# Patient Record
Sex: FEMALE | Race: WHITE | NOT HISPANIC OR LATINO | Employment: OTHER | URBAN - NONMETROPOLITAN AREA
[De-identification: names, ages, dates, MRNs, and addresses within clinical notes are randomized per-mention and may not be internally consistent; named-entity substitution may affect disease eponyms.]

---

## 2017-05-08 ENCOUNTER — OFFICE VISIT - GICH (OUTPATIENT)
Dept: FAMILY MEDICINE | Facility: OTHER | Age: 27
End: 2017-05-08

## 2017-05-08 ENCOUNTER — HISTORY (OUTPATIENT)
Dept: FAMILY MEDICINE | Facility: OTHER | Age: 27
End: 2017-05-08

## 2017-05-08 DIAGNOSIS — N39.0 URINARY TRACT INFECTION: ICD-10-CM

## 2017-05-08 DIAGNOSIS — R35.0 FREQUENCY OF MICTURITION: ICD-10-CM

## 2017-05-08 LAB
BACTERIA URINE: ABNORMAL BACTERIA/HPF
BILIRUB UR QL: NEGATIVE
CLARITY, URINE: ABNORMAL CLARITY
COLOR UR: YELLOW COLOR
EPITHELIAL CELLS: ABNORMAL EPI/HPF
GLUCOSE URINE: NEGATIVE MG/DL
KETONES UR QL: NEGATIVE MG/DL
LEUKOCYTE ESTERASE URINE: ABNORMAL
NITRITE UR QL STRIP: NEGATIVE
OCCULT BLOOD,URINE - HISTORICAL: ABNORMAL
PH UR: 6.5 [PH]
PROTEIN QUALITATIVE,URINE - HISTORICAL: 30 MG/DL
RBC - HISTORICAL: ABNORMAL /HPF
SP GR UR STRIP: 1.01
UROBILINOGEN,QUALITATIVE - HISTORICAL: NORMAL EU/DL
WBC - HISTORICAL: ABNORMAL /HPF

## 2017-05-10 ENCOUNTER — HISTORY (OUTPATIENT)
Dept: FAMILY MEDICINE | Facility: OTHER | Age: 27
End: 2017-05-10

## 2017-05-10 ENCOUNTER — OFFICE VISIT - GICH (OUTPATIENT)
Dept: FAMILY MEDICINE | Facility: OTHER | Age: 27
End: 2017-05-10

## 2017-05-10 DIAGNOSIS — N30.00 ACUTE CYSTITIS WITHOUT HEMATURIA: ICD-10-CM

## 2017-05-10 LAB
CULTURE - HISTORICAL: ABNORMAL
CULTURE - HISTORICAL: ABNORMAL
SUSCEPTIBILITY RESULT - HISTORICAL: ABNORMAL

## 2017-07-21 ENCOUNTER — PRENATAL OFFICE VISIT - GICH (OUTPATIENT)
Dept: FAMILY MEDICINE | Facility: OTHER | Age: 27
End: 2017-07-21

## 2017-07-21 ENCOUNTER — HISTORY (OUTPATIENT)
Dept: FAMILY MEDICINE | Facility: OTHER | Age: 27
End: 2017-07-21

## 2017-07-21 DIAGNOSIS — Z33.1 PREGNANT STATE, INCIDENTAL: ICD-10-CM

## 2017-07-21 DIAGNOSIS — Z12.4 ENCOUNTER FOR SCREENING FOR MALIGNANT NEOPLASM OF CERVIX: ICD-10-CM

## 2017-07-21 DIAGNOSIS — N92.6 IRREGULAR MENSTRUATION: ICD-10-CM

## 2017-07-21 LAB
ABORH - HISTORICAL: NORMAL
ANTIBODY SCREEN - HISTORICAL: NEGATIVE
HBSAG CATEGORY - HISTORICAL: NONREACTIVE
HCG UR QL: POSITIVE
HEMOGLOBIN: 12.8 G/DL (ref 12–16)
MCV RBC AUTO: 83 FL (ref 80–100)
RUBELLA COMMENT - HISTORICAL: NORMAL
SPECIMEN EXPIRATION DATE/TIME - HISTORICAL: NORMAL

## 2017-07-22 LAB — TREPONEMA PALLIDUM - HISTORICAL: NEGATIVE

## 2017-07-26 ENCOUNTER — HOSPITAL ENCOUNTER (OUTPATIENT)
Dept: RADIOLOGY | Facility: OTHER | Age: 27
End: 2017-07-26
Attending: FAMILY MEDICINE

## 2017-07-26 DIAGNOSIS — Z33.1 PREGNANT STATE, INCIDENTAL: ICD-10-CM

## 2017-07-28 ENCOUNTER — AMBULATORY - GICH (OUTPATIENT)
Dept: FAMILY MEDICINE | Facility: OTHER | Age: 27
End: 2017-07-28

## 2017-07-28 DIAGNOSIS — R87.612 LOW GRADE SQUAMOUS INTRAEPITHELIAL LESION ON CYTOLOGIC SMEAR OF CERVIX (LGSIL): ICD-10-CM

## 2017-08-25 ENCOUNTER — HISTORY (OUTPATIENT)
Dept: EMERGENCY MEDICINE | Facility: OTHER | Age: 27
End: 2017-08-25

## 2017-08-28 ENCOUNTER — HISTORY (OUTPATIENT)
Dept: FAMILY MEDICINE | Facility: OTHER | Age: 27
End: 2017-08-28

## 2017-08-28 ENCOUNTER — PRENATAL OFFICE VISIT - GICH (OUTPATIENT)
Dept: FAMILY MEDICINE | Facility: OTHER | Age: 27
End: 2017-08-28

## 2017-08-28 DIAGNOSIS — Z34.02 ENCOUNTER FOR SUPERVISION OF NORMAL FIRST PREGNANCY IN SECOND TRIMESTER: ICD-10-CM

## 2017-08-28 DIAGNOSIS — O21.9 VOMITING OF PREGNANCY: ICD-10-CM

## 2017-08-28 ASSESSMENT — PATIENT HEALTH QUESTIONNAIRE - PHQ9: SUM OF ALL RESPONSES TO PHQ QUESTIONS 1-9: 0

## 2017-09-11 ENCOUNTER — HISTORY (OUTPATIENT)
Dept: EMERGENCY MEDICINE | Facility: OTHER | Age: 27
End: 2017-09-11

## 2017-10-06 ENCOUNTER — PRENATAL OFFICE VISIT - GICH (OUTPATIENT)
Dept: OBGYN | Facility: OTHER | Age: 27
End: 2017-10-06

## 2017-10-06 ENCOUNTER — HISTORY (OUTPATIENT)
Dept: OBGYN | Facility: OTHER | Age: 27
End: 2017-10-06

## 2017-10-06 DIAGNOSIS — O21.9 VOMITING OF PREGNANCY: ICD-10-CM

## 2017-10-06 DIAGNOSIS — Z34.93 ENCOUNTER FOR SUPERVISION OF NORMAL PREGNANCY IN THIRD TRIMESTER: ICD-10-CM

## 2017-10-09 ENCOUNTER — HOSPITAL ENCOUNTER (OUTPATIENT)
Dept: RADIOLOGY | Facility: OTHER | Age: 27
End: 2017-10-09
Attending: OBSTETRICS & GYNECOLOGY

## 2017-10-09 DIAGNOSIS — O21.9 VOMITING OF PREGNANCY: ICD-10-CM

## 2017-11-07 ENCOUNTER — PRENATAL OFFICE VISIT - GICH (OUTPATIENT)
Dept: OBGYN | Facility: OTHER | Age: 27
End: 2017-11-07

## 2017-11-07 DIAGNOSIS — Z34.02 ENCOUNTER FOR SUPERVISION OF NORMAL FIRST PREGNANCY IN SECOND TRIMESTER: ICD-10-CM

## 2017-11-15 ENCOUNTER — COMMUNICATION - GICH (OUTPATIENT)
Dept: FAMILY MEDICINE | Facility: OTHER | Age: 27
End: 2017-11-15

## 2017-11-15 DIAGNOSIS — Z34.02 ENCOUNTER FOR SUPERVISION OF NORMAL FIRST PREGNANCY IN SECOND TRIMESTER: ICD-10-CM

## 2017-11-17 ENCOUNTER — AMBULATORY - GICH (OUTPATIENT)
Dept: FAMILY MEDICINE | Facility: OTHER | Age: 27
End: 2017-11-17

## 2017-12-05 ENCOUNTER — PRENATAL OFFICE VISIT - GICH (OUTPATIENT)
Dept: OBGYN | Facility: OTHER | Age: 27
End: 2017-12-05

## 2017-12-05 ENCOUNTER — HISTORY (OUTPATIENT)
Dept: OBGYN | Facility: OTHER | Age: 27
End: 2017-12-05

## 2017-12-05 DIAGNOSIS — Z34.02 ENCOUNTER FOR SUPERVISION OF NORMAL FIRST PREGNANCY IN SECOND TRIMESTER: ICD-10-CM

## 2017-12-05 LAB
ABSOLUTE BASOPHILS - HISTORICAL: 0 THOU/CU MM
ABSOLUTE EOSINOPHILS - HISTORICAL: 0.1 THOU/CU MM
ABSOLUTE IMMATURE GRANULOCYTES(METAS,MYELOS,PROS) - HISTORICAL: 0.1 THOU/CU MM
ABSOLUTE LYMPHOCYTES - HISTORICAL: 1.5 THOU/CU MM (ref 0.9–2.9)
ABSOLUTE MONOCYTES - HISTORICAL: 0.7 THOU/CU MM
ABSOLUTE NEUTROPHILS - HISTORICAL: 7.4 THOU/CU MM (ref 1.7–7)
BASOPHILS # BLD AUTO: 0.2 %
EOSINOPHIL NFR BLD AUTO: 0.8 %
ERYTHROCYTE [DISTWIDTH] IN BLOOD BY AUTOMATED COUNT: 13.1 % (ref 11.5–15.5)
GLU GEST SCREEN 1HR 50G: 87 MG/DL (ref 65–139)
HCT VFR BLD AUTO: 33.6 % (ref 33–51)
HEMOGLOBIN: 10.9 G/DL (ref 12–16)
IMMATURE GRANULOCYTES(METAS,MYELOS,PROS) - HISTORICAL: 0.5 %
LYMPHOCYTES NFR BLD AUTO: 15.4 % (ref 20–44)
MCH RBC QN AUTO: 26.3 PG (ref 26–34)
MCHC RBC AUTO-ENTMCNC: 32.4 G/DL (ref 32–36)
MCV RBC AUTO: 81 FL (ref 80–100)
MONOCYTES NFR BLD AUTO: 6.9 %
NEUTROPHILS NFR BLD AUTO: 76.2 % (ref 42–72)
PLATELET # BLD AUTO: 280 THOU/CU MM (ref 140–440)
PMV BLD: 9.9 FL (ref 6.5–11)
RED BLOOD COUNT - HISTORICAL: 4.15 MIL/CU MM (ref 4–5.2)
WHITE BLOOD COUNT - HISTORICAL: 9.7 THOU/CU MM (ref 4.5–11)

## 2017-12-07 LAB — TREPONEMA PALLIDUM - HISTORICAL: NEGATIVE

## 2017-12-27 NOTE — PROGRESS NOTES
Patient Information     Patient Name MRN Sex Tasia Garay [id number] Female 1990      Progress Notes by Phani Good MD at 10/6/2017  3:00 PM     Author:  Phani Godo MD Service:  (none) Author Type:  Physician     Filed:  10/6/2017  4:18 PM Encounter Date:  10/6/2017 Status:  Signed     :  Phani Good MD (Physician)            [facility]  Return OB Visit    S: Patient reports she has been feeling naueated. She denies cramping, contractions, vaginal bleeding, leaking fluid. She reports no normal fetal movement yet. She has no other complaints.    O: /60  Pulse 68  Wt 82.3 kg (181 lb 6.4 oz)  LMP 2017 (Exact Date)  Breastfeeding? No  BMI 29.28 kg/m2  See flowsheet    Gen: Well-appearing, NAD  Fundal Height:  19  FHR: 145      A/P:  Tasia Duarte is a 26 y.o.  at 19w1d, here for return OB visit.    PNC:   - Prenatal labs:   AB Rh Positive  Rubella immune  tdap tbd  GCT tbd  Syphilis neg  Flu shot declines  GBS:tbd    Estimated Date of Delivery: 3/1/18  - Genetics: declined  - Imaging: tbd  - Immunizations: tdap at 28 weeks.  RTC one month.    Phani Good MD FACOG  3:06 PM 10/6/2017

## 2017-12-27 NOTE — PROGRESS NOTES
Patient Information     Patient Name MRN Sex Tasia Garay [id number] Female 1990      Progress Notes by Arelis Inman R.T. ([facility]) at 10/9/2017 12:43 PM     Author:  Arelis In[facility] R.T. ([facility]) Service:  (none) Author Type:  [employer] - Registered Radiologic Technologist     Filed:  10/9/2017 12:43 PM Date of Service:  10/9/2017 12:43 PM Status:  Signed     :  Arelis Inman R.T. ([facility]) (Replaced by [facility] - Registered Radiologic Technologist)            Falls Risk Criteria:    Age 65 and older or under age 4        Sensory deficits    Poor vision    Use of ambulatory aides    Impaired judgment    Unable to walk independently    Meets High Risk criteria for falls:  no

## 2017-12-27 NOTE — PROGRESS NOTES
Patient Information     Patient Name MRN Sex Tasia Garay [id number] Female 1990      Progress Notes by Ana Sanches MD at 2017  5:32 PM     Author:  Ana Sanches MD Service:  (none) Author Type:  Physician     Filed:  2017  5:32 PM Encounter Date:  2017 Status:  Signed     :  Ana Sanches MD (Physician)            Notified of results via [employer].

## 2017-12-27 NOTE — PROGRESS NOTES
Patient Information     Patient Name MRN Sex Tasia Garay [id number] Female 1990      Progress Notes by Jing Camacho at 2017  8:52 AM     Author:  Jing Camacho Service:  (none) Author Type:  Other Clinical Staff     Filed:  2017  9:16 AM Date of Service:  2017  8:52 AM Status:  Signed     :  Jing Camacho (Other Clinical Staff)            Falls Risk Criteria:    Age 65 and older or under age 4        Sensory deficits    Poor vision    Use of ambulatory aides    Impaired judgment    Unable to walk independently    Meets High Risk criteria for falls:  No

## 2017-12-28 NOTE — PROGRESS NOTES
Patient Information     Patient Name MRTasia Knox [id number] Female 1990      Progress Notes by Swati Randle MD at 2017  9:00 AM     Author:  Swati Randle MD Service:  (none) Author Type:  Physician     Filed:  2017  9:46 AM Encounter Date:  2017 Status:  Signed     :  Swati Randle MD (Physician)            [facility]  Return OB Visit    S: Patient reports she has been feeling well. She denies cramping, contractions, vaginal bleeding, leaking fluid. She reports normal fetal movement. She has no other complaints.    O: /70  Pulse 84  Wt 82.3 kg (181 lb 6.4 oz)  LMP 2017 (Exact Date)  BMI 29.28 kg/m2  Gen: Well-appearing, NAD    Fundal Height:  24  FHR: 150    A/P:  Tasia Duarte is a 26 y.o.  at 23w5d by 8w6d US, here for return OB visit.    PNC:   - Prenatal labs reviewed, Rh positive, Rubella immune  - Genetics: declined  - Imaging: dating US at 8w6d, normal anatomy at 19w4d  - Immunizations: declines flu  RTC 4 wks- will need Tdap, GCT, syphilis, CBC    Swati Randle MD  OB/GYN  2017 9:25 AM

## 2017-12-28 NOTE — PROGRESS NOTES
Patient Information     Patient Name MRN Sex     Tasia Duarte [id number] Female 1990      Progress Notes by Ana Sanches MD at 2017  9:49 AM     Author:  Ana Sanches MD Service:  (none) Author Type:  Physician     Filed:  2017 11:11 AM Encounter Date:  2017 Status:  Signed     :  Ana Sanches MD (Physician)              PRENATAL EXAM - OB    Tasia Duarte is a 26 y.o. female, G 1, P 0, here today for a prenatal exam.    :  1990  Race/Ethnicity:  /White        Marital Status: single  Jewish:  None  Occupation:  outside work - full time  ( at [employer])  [facility]   's Provider:  provider not selected at this time     Education (last grade completed):  12th    /Father of baby:    Partner's Occupation:    Contact Phone #:      MENSTRUAL HISTORY  LMP:  Patient's last menstrual period was 2017 (exact date).  Cycle Regularity:  regular, every 28-30 days  Flow:  normal    GIDEON by LMP Calculator:  18  Date Reliability:  definite  Menarche (age onset):  15 years of age  On BCP's at conception: no     MEDICAL HISTORY  OB History                    Para  Term     AB  Living     1                 SAB   TAB  Ectopic  Multiple   Live Births                                         # Outcome Date  GA  Lbr Paul/2nd  Weight Sex  Delivery Anes PTL Lv   1 Current                                     No past medical history on file.    Past Surgical History:      Procedure  Laterality Date     NO PAST SURGERIES         Family History:  No family history on file.    Social History:  Social History     Substance Use Topics       Smoking status: Never Smoker     Smokeless tobacco: Never Used     Alcohol use No       RISK FACTORS  Exercise Times/wk:  0  Seat Belt Use: 100%  Alcohol/day: 0  Meds/Drugs/ETOH Since LMP:  No  Birth Control Method: none  High Risk Behavior: none    INFECTION HISTORY  Current Drug Use:   none  HIV Risk Evaluation:  low risk  Hepatitis B Risk Evaluation: low risk  Hepatitis B Immunized: yes  TB Exposure: no    Personal History of Genital Herpes: no  Partner History of Genital Herpes: no  Rash/Viral Illness Since LMP: No  History of STD: chlamydia  Other:      REVIEW OF SYSTEMS   Review of Systems Negative.    PHYSICAL EXAM  /62  Wt 79.4 kg (175 lb)  LMP 05/20/2017 (Exact Date)  Breastfeeding? No  BMI 27.82 kg/m2  General Appearance:  Alert, appropriate appearance for age. No acute distress  HEENT Exam:  Grossly normal.  Neck / Thyroid Exam:  Supple, no masses, nodes or enlargement.  Chest/Respiratory Exam: Normal chest wall and respirations. Clear to auscultation.  Cardiovascular Exam: Regular rate and rhythm. S1, S2, no murmur, click, gallop, or rubs.  Gastrointestinal Exam: Soft, non-tender, no masses or organomegaly.  Pelvic Exam Female: Vulva and vagina appear normal. Bimanual exam reveals normal uterus and adnexa.   Musculoskeletal Exam: Back is straight and non-tender, full ROM of upper and lower extremities.  Skin: no rash or abnormalities  Neurologic Exam: Normal gait and speech, no tremor.  Psychiatric Exam: Alert and oriented, appropriate affect.    ASSESSMENT    ICD-10-CM    1. Missed periods N92.6 Urine pregnancy test (HCG), qualitative      Urine pregnancy test (HCG), qualitative   2. Early stage of pregnancy Z33.1 TYPE & SCREEN      HEMOGLOBIN      HBSAG (HBS)      RUBELLA IMMUNE STATUS      TREPONEMA PALLIDUM      GYN PROBE - GC CHLAMYDIA DNA PCR [[id number]]      URINE CULTURE [83681.2]      TYPE & SCREEN      HEMOGLOBIN      HBSAG (HBS)      RUBELLA IMMUNE STATUS      TREPONEMA PALLIDUM      URINE CULTURE [25082.2]      GYN PROBE - GC CHLAMYDIA DNA PCR [[id number]]      US OB ANY TRI TV   3. Screening for malignant neoplasm of cervix Z12.4 GYN THIN PREP PAP SCREEN IMAGED [[id number]]      GYN THIN PREP PAP SCREEN IMAGED [[id number]]       Satisfactory prenatal exam.  Demonstrates  appropriate and health seeking behaviors toward her pregnancy.  Verbalizes good understanding of prenatal care schedule and the importance of coming to each visit as scheduled.  Has supportive family relationship.     PLAN  Discussed routine recommendations and anticipatory guidance. Recommended PNVs. Discussed screening tests available. OB packet provided. Will order routine OB labs. Discussed timing of follow-up and ultrasound. Would recommend dating ultrasound.    Ana Sanches MD

## 2017-12-28 NOTE — PROGRESS NOTES
Patient Information     Patient Name MRN Sex Tasia Garay [id number] Female 1990      Progress Notes by Ana Sanches MD at 2017  2:30 PM     Author:  Ana Sanches MD Service:  (none) Author Type:  Physician     Filed:  2017  2:30 PM Date of Service:  2017  2:30 PM Status:  Signed     :  Ana Sanches MD (Physician)            Notified of results via [employer].

## 2017-12-28 NOTE — PROGRESS NOTES
Patient Information     Patient Name MRN Sex Tasia Garay [id number] Female 1990      Progress Notes by Ana Sanches MD at 2017  8:45 AM     Author:  Ana Sanches MD Service:  (none) Author Type:  Physician     Filed:  2017  9:25 AM Encounter Date:  2017 Status:  Signed     :  Ana Sanches MD (Physician)            Reviewed previous labs, colp scheduled for LSIL pap.   Reviewed ultrasound and date discrepancy, adjusted EDC to correspond with 8w ultrasound.   Patient declines quad screen.   Recent ED visit for nausea and emesis. Feels much better after hydration.  Needs prescription for occasional use of zofran. Ana Sanches MD

## 2017-12-28 NOTE — TELEPHONE ENCOUNTER
Patient Information     Patient Name MRN Sex Tasia Garay [id number] Female 1990      Telephone Encounter by Ana Sanches MD at 11/15/2017  2:51 PM     Author:  Ana Sanches MD Service:  (none) Author Type:  Physician     Filed:  11/15/2017  2:55 PM Encounter Date:  11/15/2017 Status:  Signed     :  Ana Sanches MD (Physician)            Patient can have labs done at the time of her next OB appointment. So does not need to come on a separate day, and labs can be ordered at the time of her appointment.     Also, it looks like she missed her appointment for the colposcopy. She should discuss this with Dr. Randle at her appointment.    Ana Sanches MD

## 2017-12-30 NOTE — NURSING NOTE
Patient Information     Patient Name MRN Tasia Caba [id number] Female 1990      Nursing Note by Brinda De Santiago at 10/6/2017  3:00 PM     Author:  Brinda De Santiago Service:  (none) Author Type:  NURS- Student Nurse     Filed:  10/6/2017  3:03 PM Encounter Date:  10/6/2017 Status:  Signed     :  Brinda De Santiago (NURS- Student Nurse)            Patient is here for an OB check. 2 baby step coupons given. Brinda De Santiago RN ....................  10/6/2017   2:52 PM

## 2017-12-30 NOTE — NURSING NOTE
Patient Information     Patient Name MRN Tasia Caba [id number] Female 1990      Nursing Note by Yuliana Mascorro at 2017  9:30 AM     Author:  Yuliana Mascorro Service:  (none) Author Type:  (none)     Filed:  2017  9:59 AM Encounter Date:  2017 Status:  Signed     :  Yuliana Mascorro            Patient here for OB physical.   Yuliana Mascorro LPN ..........2017 9:35 AM

## 2018-01-02 ENCOUNTER — HISTORY (OUTPATIENT)
Dept: OBGYN | Facility: OTHER | Age: 28
End: 2018-01-02

## 2018-01-02 ENCOUNTER — PRENATAL OFFICE VISIT - GICH (OUTPATIENT)
Dept: OBGYN | Facility: OTHER | Age: 28
End: 2018-01-02

## 2018-01-02 DIAGNOSIS — Z34.03 ENCOUNTER FOR SUPERVISION OF NORMAL FIRST PREGNANCY IN THIRD TRIMESTER: ICD-10-CM

## 2018-01-04 NOTE — NURSING NOTE
Patient Information     Patient Name MRN Tasia Caba [id number] Female 1990      Nursing Note by Kelsie Ruff at 2017  7:00 PM     Author:  Kelsie Ruff Service:  (none) Author Type:  (none)     Filed:  2017  7:07 PM Encounter Date:  2017 Status:  Signed     :  Kelsie Ruff            Patient presents to clinic with hurting during urination, lower back pain.  Kelsie Pearson ....................  2017   7:07 PM

## 2018-01-04 NOTE — PROGRESS NOTES
"Patient Information     Patient Name MRN Sex Tasia Garay [id number] Female 1990      Progress Notes by Candida Zhu NP at 2017  7:00 PM     Author:  Candida Zhu NP Service:  (none) Author Type:  PHYS- Nurse Practitioner     Filed:  2017  8:13 PM Encounter Date:  2017 Status:  Signed     :  Candida Zhu NP (PHYS- Nurse Practitioner)            HPI:    Tasia Duarte is a 26 y.o. female who presents to clinic today for urinary tract infection. Has had them in the past. Started a few weeks ago thought was getting one so took OTC stuff. Then yesterday got a fever. Has frequency, pressure, pain with voiding, and right back pain. No blood in urine.  Nursing Notes:   Kelsie Ruff  2017  7:07 PM  Signed  Patient presents to clinic with hurting during urination, lower back pain.  Kelsie Pearson ....................  2017   7:07 PM          No past medical history on file.  Past Surgical History:      Procedure  Laterality Date     NO PAST SURGERIES       Social History     Substance Use Topics       Smoking status: Never Smoker     Smokeless tobacco: Never Used     Alcohol use No     No current outpatient prescriptions on file.     No current facility-administered medications for this visit.      Medications have been reviewed by me and are current to the best of my knowledge and ability.    No Known Allergies    ROS:  Refer to HPI    /68  Pulse 100  Temp 100.1  F (37.8  C) (Tympanic)   Ht 1.689 m (5' 6.5\")  Wt 76.4 kg (168 lb 6.4 oz)  LMP 2017  Breastfeeding? No  BMI 26.77 kg/m2    EXAM:  General Appearance: Well appearing female, appropriate appearance for age. No acute distress  Head: normocephalic, atraumatic  Eyes: conjunctivae normal, no drainage  Neck: supple without adenopathy  Respiratory: normal chest wall and respirations.  Normal effort.  Clear to auscultation bilaterally, no wheezes or rhonchi or congestion, no cough " appreciated,  Cardiac: RRR with no murmurs  Abdomen: soft, nontender, normal bowel sounds present  : mild suprapubic tenderness. Right CVA tenderness  Musculoskeletal:full ROM  Dermatological: no rashes or lesions  Psychological: normal affect, alert and pleasant  Results for orders placed or performed in visit on 05/08/17      URINALYSIS W REFLEX MICROSCOPIC IF POSITIVE      Result  Value Ref Range    COLOR                     Yellow Yellow Color    CLARITY                   Cloudy (A) Clear Clarity    SPECIFIC GRAVITY,URINE    1.015 1.010, 1.015, 1.020, 1.025                    PH,URINE                  6.5 6.0, 7.0, 8.0, 5.5, 6.5, 7.5, 8.5                    UROBILINOGEN,QUALITATIVE  Normal Normal EU/dl    PROTEIN, URINE 30 (A) Negative mg/dL    GLUCOSE, URINE Negative Negative mg/dL    KETONES,URINE             Negative Negative mg/dL    BILIRUBIN,URINE           Negative Negative                    OCCULT BLOOD,URINE        Trace (A) Negative                    NITRITE                   Negative Negative                    LEUKOCYTE ESTERASE        Trace (A) Negative                   URINALYSIS MICROSCOPIC      Result  Value Ref Range    RBC 0-2 0-2, None Seen /HPF    WBC 6-10 (A) 0-2, 3-5, None Seen /HPF    BACTERIA                  Moderate (A) None Seen, Rare, Occasional, Few Bacteria/HPF    EPITHELIAL CELLS          Many (A) None Seen, Few Epi/HPF       ASSESSMENT/PLAN:    ICD-10-CM    1. Urinary tract infection without hematuria, site unspecified N39.0 trimethoprim-sulfamethoxazole, 160-800 mg, (BACTRIM DS, SEPTRA DS) tablet      URINE CULTURE   2. Frequency of urination R35.0 URINALYSIS W REFLEX MICROSCOPIC IF POSITIVE      URINALYSIS W REFLEX MICROSCOPIC IF POSITIVE      URINALYSIS MICROSCOPIC      URINALYSIS MICROSCOPIC       UTI: Antibiotic has been sent to pharmacy. Please take full course of antibiotic even if symptoms have completely resolved. This helps prevent against antibiotic resistance.      We will culture the urine to see what bacteria grows out of the urine.  We will call the patient if a change of antibiotic is necessary per the culture.  Patient was instructed in increase fluids including water and cranberry juice and or may take cranberry tablets.  Monitor for fevers, chills, back pain.  Return to clinic after antibiotic completion if symptoms are not resolved.  Call clinic or Primary Care Provider if symptoms change/worsen.    Handouts reviewed and given. States understanding of plan.    Encouraged fluids  Symptomatic treatments  Tylenol or ibuprofen PRN  Follow up if symptoms persist or worsen    Patient Instructions      Index Related topics   Bladder Infection   ________________________________________________________________________  KEY POINTS    A bladder infection, also called cystitis, is a type of urinary tract infection. Your urinary tract includes your kidneys, ureters, bladder, and urethra.    Your healthcare provider will likely prescribe an antibiotic medicine and medicine to help relieve burning and discomfort.    Follow the full course of treatment prescribed by your healthcare provider. If you are taking an antibiotic medicine, take all of it as prescribed, even if your symptoms are gone.  ________________________________________________________________________  What is a bladder infection?  A bladder infection, also called cystitis, is a type of urinary tract infection. The urinary tract includes your:    Kidneys, which make urine    Ureters, which are the tubes that carry urine from the kidneys to the bladder    Bladder, which stores urine    Urethra, which is the tube that drains urine from the bladder and out of the body  What is the cause?  Bladder infections are usually caused by bacteria. Normally there should be no bacteria in your urinary tract. Bacteria that cause infections in the urinary tract often spread from the rectum or vagina to the urethra and up into the  bladder.  Bladder infections are more common in women because the urethra is short. The short urethra makes it easier for bacteria from the rectum or the genital area to reach the bladder. This can happen during sex. Young women often have bladder infections when they have just started having sex. In older women, irritation and dryness of the vagina after menopause may increase the risk for bladder infections.  Bacteria may grow in the bladder if the flow of urine is blocked. For example, when a woman is pregnant, pressure from the baby can cause this problem. In men, an enlarged prostate may cause a blockage. Stones in the kidney or bladder can also cause blockage and infections.  If you have recently had a urinary catheter (for example, during surgery) or if you need to use a catheter every day, you are more likely to get bladder infections.  What are the symptoms?  The symptoms range from mild to severe. They may include:    Urinating more often    Feeling an urgent need to urinate or feeling that your bladder is always full    Pain or burning when you urinate    Pain in your lower belly, low back, or your side    Urine that smells bad    Urine that looks cloudy, reddish, or bloody    Leaking of urine  Fever and chills are more common with kidney infections, but can also happen with bladder infections.  How is it diagnosed?  Your healthcare provider will ask about your symptoms and medical history and examine you. You may have urine and blood tests.    If you have many bladder infections, you may need to have additional tests to see if there is a problem in the kidneys or urinary tract:    An intravenous pyelogram (IVP), which is a series of X-rays taken after your healthcare provider injects dye into your blood vessels to look for blockages in your kidneys and urinary tract    An ultrasound, which uses sound waves to show pictures of the kidneys and urinary tract  Men may have tests after just one infection  because bladder infections are less common in men and may mean there are other problems.  How is it treated?  Your healthcare provider will likely prescribe an antibiotic and medicine to help relieve burning and discomfort. Prompt treatment of a bladder infection with antibiotics usually relieves the symptoms in 1 to 2 days. If your infection has been causing symptoms for several days before treatment or if you have a fever, it may take longer to feel better.  It s important to get prompt treatment for bladder infections. If the infection is not treated, it can damage your kidneys and make you very sick. If the infection spreads to your blood, it can be life-threatening. If you are very sick, you may need to be in the hospital and get antibiotic medicine IV.  How can I take care of myself?    Drink plenty of water each day to flush your bladder and urinary tract unless your healthcare provider has told you to limit how much liquid you drink.    If you have a fever, ask your healthcare provider if you should take an NSAID or acetaminophen. Read the label and take as directed. Unless recommended by your healthcare provider, you should not take these medicines for more than 10 days.    Nonsteroidal anti-inflammatory medicines (NSAIDs), such as ibuprofen, naproxen, and aspirin, may cause stomach bleeding and other problems. These risks increase with age.    Acetaminophen may cause liver damage or other problems. Unless recommended by your provider, don't take more than 3000 milligrams (mg) in 24 hours. To make sure you don t take too much, check other medicines you take to see if they also contain acetaminophen. Ask your provider if you need to avoid drinking alcohol while taking this medicine.    Follow the full course of treatment prescribed by your healthcare provider. If you were prescribed an antibiotic medicine, take the antibiotics for as long as your healthcare provider prescribes, even if you feel better. If  you stop taking the medicine too soon, you may not kill all of the bacteria and you may get sick again. If you have side effects from your medicine, talk to your healthcare provider.    Ask your provider:    How and when you will get your test results    How long it will take to recover    If there are activities you should avoid and when you can return to your normal activities    How to take care of yourself at home    What symptoms or problems you should watch for and what to do if you have them    Make sure you know when you should come back for a checkup.  How can I help prevent bladder infection?  You may help prevent bladder infection if you:    Drink enough liquids to keep urine light yellow in color.    Drink a glass of cranberry juice each day. The juice should be real cranberry juice, not a cranberry-flavored drink.    Don t wait to go to the bathroom if you feel the need to urinate.    Practice safe sex:    Ask your healthcare provider which type of condom, diaphragm, or other birth control is right for you.    Urinate soon after sex.    Keep your genital area clean. If you want to have vaginal sex after anal sex, both partners should wash their genitals first.    Empty your bladder completely when you urinate.    Don t wear a wet bathing suit for long periods of time.  Also, if you are a woman:    If you often have bladder infections, keep a journal to see if they are related to sex. If they tend to happen after sex, your provider may prescribe medicine for you to take to help prevent infection.    Don t use irritating cosmetics or chemicals in your genital area. This includes, for example, strong soaps, feminine hygiene sprays, douches, scented tampons, sanitary napkins, or panty liners.    Keep your vaginal area clean. Wiping from front to back after using the toilet may help prevent infections. Use mild, unscented soap to wash your genital area gently each time you bathe or shower.    Wear underwear  that is all cotton or has a cotton crotch. Pantyhose should also have a cotton crotch. Cotton absorbs moisture better than nylon. Change underwear and pantyhose every day.    During pregnancy, tell your healthcare provider if you have had urinary tract problems in the past and let your provider know if you are having symptoms.    If you have reached menopause and are not taking estrogen, prescription estrogen vaginal cream may help prevent bladder infections.  Men should always wash their penis during baths or showers. Men who are not circumcised should gently pull back the foreskin and wash the tip of the penis when they take a bath or shower.  Developed by [employer].  Adult Advisor 2016.3 published by [employer].  Last modified: 2016-05-10  Last reviewed: 2016-04-26  This content is reviewed periodically and is subject to change as new health information becomes available. The information is intended to inform and educate and is not a replacement for medical evaluation, advice, diagnosis or treatment by a healthcare professional.  References   Adult Advisor 2016.3 Index    Copyright   2016 [employer], a division of [employer] All rights reserved.           Antibiotic has been sent to pharmacy. Please take full course of antibiotic even if symptoms have completely resolved. This helps prevent against antibiotic resistance.     We will culture the urine to see what bacteria grows out of the urine.  We will call the patient if a change of antibiotic is necessary per the culture.  Patient was instructed in increase fluids including water and cranberry juice and or may take cranberry tablets.  Monitor for fevers, chills, back pain.  Return to clinic after antibiotic completion if symptoms are not resolved.  Call clinic or Primary Care Provider if symptoms change/worsen.    Handouts reviewed and given. States understanding of plan.          FARHAN PULLIAM NP ....................  5/8/2017   7:15  PM

## 2018-01-04 NOTE — PATIENT INSTRUCTIONS
Patient Information     Patient Name MRN Tasia Caba [id number] Female 1990      Patient Instructions by Candida Zhu NP at 2017  7:00 PM     Author:  Candida Zhu NP Service:  (none) Author Type:  PHYS- Nurse Practitioner     Filed:  2017  7:35 PM Encounter Date:  2017 Status:  Signed     :  Candida Zhu NP (PHYS- Nurse Practitioner)               Index Related topics   Bladder Infection   ________________________________________________________________________  KEY POINTS    A bladder infection, also called cystitis, is a type of urinary tract infection. Your urinary tract includes your kidneys, ureters, bladder, and urethra.    Your healthcare provider will likely prescribe an antibiotic medicine and medicine to help relieve burning and discomfort.    Follow the full course of treatment prescribed by your healthcare provider. If you are taking an antibiotic medicine, take all of it as prescribed, even if your symptoms are gone.  ________________________________________________________________________  What is a bladder infection?  A bladder infection, also called cystitis, is a type of urinary tract infection. The urinary tract includes your:    Kidneys, which make urine    Ureters, which are the tubes that carry urine from the kidneys to the bladder    Bladder, which stores urine    Urethra, which is the tube that drains urine from the bladder and out of the body  What is the cause?  Bladder infections are usually caused by bacteria. Normally there should be no bacteria in your urinary tract. Bacteria that cause infections in the urinary tract often spread from the rectum or vagina to the urethra and up into the bladder.  Bladder infections are more common in women because the urethra is short. The short urethra makes it easier for bacteria from the rectum or the genital area to reach the bladder. This can happen during sex. Young women often have  bladder infections when they have just started having sex. In older women, irritation and dryness of the vagina after menopause may increase the risk for bladder infections.  Bacteria may grow in the bladder if the flow of urine is blocked. For example, when a woman is pregnant, pressure from the baby can cause this problem. In men, an enlarged prostate may cause a blockage. Stones in the kidney or bladder can also cause blockage and infections.  If you have recently had a urinary catheter (for example, during surgery) or if you need to use a catheter every day, you are more likely to get bladder infections.  What are the symptoms?  The symptoms range from mild to severe. They may include:    Urinating more often    Feeling an urgent need to urinate or feeling that your bladder is always full    Pain or burning when you urinate    Pain in your lower belly, low back, or your side    Urine that smells bad    Urine that looks cloudy, reddish, or bloody    Leaking of urine  Fever and chills are more common with kidney infections, but can also happen with bladder infections.  How is it diagnosed?  Your healthcare provider will ask about your symptoms and medical history and examine you. You may have urine and blood tests.    If you have many bladder infections, you may need to have additional tests to see if there is a problem in the kidneys or urinary tract:    An intravenous pyelogram (IVP), which is a series of X-rays taken after your healthcare provider injects dye into your blood vessels to look for blockages in your kidneys and urinary tract    An ultrasound, which uses sound waves to show pictures of the kidneys and urinary tract  Men may have tests after just one infection because bladder infections are less common in men and may mean there are other problems.  How is it treated?  Your healthcare provider will likely prescribe an antibiotic and medicine to help relieve burning and discomfort. Prompt treatment of  a bladder infection with antibiotics usually relieves the symptoms in 1 to 2 days. If your infection has been causing symptoms for several days before treatment or if you have a fever, it may take longer to feel better.  It s important to get prompt treatment for bladder infections. If the infection is not treated, it can damage your kidneys and make you very sick. If the infection spreads to your blood, it can be life-threatening. If you are very sick, you may need to be in the hospital and get antibiotic medicine IV.  How can I take care of myself?    Drink plenty of water each day to flush your bladder and urinary tract unless your healthcare provider has told you to limit how much liquid you drink.    If you have a fever, ask your healthcare provider if you should take an NSAID or acetaminophen. Read the label and take as directed. Unless recommended by your healthcare provider, you should not take these medicines for more than 10 days.    Nonsteroidal anti-inflammatory medicines (NSAIDs), such as ibuprofen, naproxen, and aspirin, may cause stomach bleeding and other problems. These risks increase with age.    Acetaminophen may cause liver damage or other problems. Unless recommended by your provider, don't take more than 3000 milligrams (mg) in 24 hours. To make sure you don t take too much, check other medicines you take to see if they also contain acetaminophen. Ask your provider if you need to avoid drinking alcohol while taking this medicine.    Follow the full course of treatment prescribed by your healthcare provider. If you were prescribed an antibiotic medicine, take the antibiotics for as long as your healthcare provider prescribes, even if you feel better. If you stop taking the medicine too soon, you may not kill all of the bacteria and you may get sick again. If you have side effects from your medicine, talk to your healthcare provider.    Ask your provider:    How and when you will get your test  results    How long it will take to recover    If there are activities you should avoid and when you can return to your normal activities    How to take care of yourself at home    What symptoms or problems you should watch for and what to do if you have them    Make sure you know when you should come back for a checkup.  How can I help prevent bladder infection?  You may help prevent bladder infection if you:    Drink enough liquids to keep urine light yellow in color.    Drink a glass of cranberry juice each day. The juice should be real cranberry juice, not a cranberry-flavored drink.    Don t wait to go to the bathroom if you feel the need to urinate.    Practice safe sex:    Ask your healthcare provider which type of condom, diaphragm, or other birth control is right for you.    Urinate soon after sex.    Keep your genital area clean. If you want to have vaginal sex after anal sex, both partners should wash their genitals first.    Empty your bladder completely when you urinate.    Don t wear a wet bathing suit for long periods of time.  Also, if you are a woman:    If you often have bladder infections, keep a journal to see if they are related to sex. If they tend to happen after sex, your provider may prescribe medicine for you to take to help prevent infection.    Don t use irritating cosmetics or chemicals in your genital area. This includes, for example, strong soaps, feminine hygiene sprays, douches, scented tampons, sanitary napkins, or panty liners.    Keep your vaginal area clean. Wiping from front to back after using the toilet may help prevent infections. Use mild, unscented soap to wash your genital area gently each time you bathe or shower.    Wear underwear that is all cotton or has a cotton crotch. Pantyhose should also have a cotton crotch. Cotton absorbs moisture better than nylon. Change underwear and pantyhose every day.    During pregnancy, tell your healthcare provider if you have had  urinary tract problems in the past and let your provider know if you are having symptoms.    If you have reached menopause and are not taking estrogen, prescription estrogen vaginal cream may help prevent bladder infections.  Men should always wash their penis during baths or showers. Men who are not circumcised should gently pull back the foreskin and wash the tip of the penis when they take a bath or shower.  Developed by [employer].  Adult Advisor 2016.3 published by [employer].  Last modified: 2016-05-10  Last reviewed: 2016-04-26  This content is reviewed periodically and is subject to change as new health information becomes available. The information is intended to inform and educate and is not a replacement for medical evaluation, advice, diagnosis or treatment by a healthcare professional.  References   Adult Advisor 2016.3 Index    Copyright   2016 [employer], a division of [employer] All rights reserved.           Antibiotic has been sent to pharmacy. Please take full course of antibiotic even if symptoms have completely resolved. This helps prevent against antibiotic resistance.     We will culture the urine to see what bacteria grows out of the urine.  We will call the patient if a change of antibiotic is necessary per the culture.  Patient was instructed in increase fluids including water and cranberry juice and or may take cranberry tablets.  Monitor for fevers, chills, back pain.  Return to clinic after antibiotic completion if symptoms are not resolved.  Call clinic or Primary Care Provider if symptoms change/worsen.    Handouts reviewed and given. States understanding of plan.

## 2018-01-05 NOTE — NURSING NOTE
Patient Information     Patient Name MRN Tasia Caba [id number] Female 1990      Nursing Note by Kelsie Ruff at 5/10/2017 10:30 AM     Author:  Kelsie Ruff Service:  (none) Author Type:  (none)     Filed:  5/10/2017 10:47 AM Encounter Date:  5/10/2017 Status:  Signed     :  Kelsie Ruff            Patient presents to clinic with concerns that she is not getting any better since starting the antibiotic for UTI. She was seen in the [facility] Monday, got 1 dose of antibiotics in on Monday.   Kelsie RuffLPN ....................  5/10/2017   10:39 AM

## 2018-01-05 NOTE — PROGRESS NOTES
"Patient Information     Patient Name MRN Sex Tasia Garay [id number] Female 1990      Progress Notes by Joyce Bob NP at 5/10/2017 10:30 AM     Author:  Joyce Bob NP Service:  (none) Author Type:  PHYS- Nurse Practitioner     Filed:  2017  3:48 PM Encounter Date:  5/10/2017 Status:  Signed     :  Joyce Bob NP (PHYS- Nurse Practitioner)            Nursing Notes:   Kelsie Ruff  5/10/2017 10:47 AM  Signed  Patient presents to clinic with concerns that she is not getting any better since starting the antibiotic for UTI. She was seen in the [facility] Monday, got 1 dose of antibiotics in on Monday.   Kelsie Pearson ....................  5/10/2017   10:39 AM    SUBJECTIVE:    Tasia Duarte is a 26 y.o. female who presents for uti not better    HPI  Tasia Duarte here with c/o UTI not getting better. Still feels hot and cold. Mild LT flank pain, however had these when seen in the R/C. Started on bactrim. Urine CTX back now, shows resistant to many abx. Including Bactrim.     Current Outpatient Prescriptions on File Prior to Visit       Medication  Sig Dispense Refill     trimethoprim-sulfamethoxazole, 160-800 mg, (BACTRIM DS, SEPTRA DS) tablet Take 1 tablet by mouth 2 times daily for 10 days. 20 tablet 0     No current facility-administered medications on file prior to visit.        REVIEW OF SYSTEMS:  ROS    OBJECTIVE:  /54  Pulse 84  Temp 96.2  F (35.7  C) (Tympanic)  Ht 1.689 m (5' 6.5\")  Wt 75.8 kg (167 lb 3.2 oz)  LMP 2017  BMI 26.58 kg/m2    EXAM:   Physical Exam   Constitutional: She is well-developed, well-nourished, and in no distress.   HENT:   Head: Normocephalic and atraumatic.   Cardiovascular: Normal rate.    Pulmonary/Chest: Effort normal. No respiratory distress.   Abdominal: Soft. Bowel sounds are normal. There is tenderness in the suprapubic area. There is no rebound and no guarding.   Skin: Skin is warm and " dry.   Nursing note and vitals reviewed.      ASSESSMENT/PLAN:    ICD-10-CM    1. Acute cystitis without hematuria N30.00 ciprofloxacin HCl (CIPRO) 250 mg tablet      cefTRIAXone injection (ROCEPHIN) 1 g        Plan:  Discussed the ABX currently on is not going to work. Opted to change abx, and give a shot of Rocephin. If not improving over the next few days she needs to come to the ER or RTC.     I explained my diagnostic considerations and recommendations to the patient, who voiced understanding and agreement with the treatment plan. All questions were answered. We discussed potential side effects of any prescribed or recommended therapies, as well as expectations for response to treatments. She was advised to contact our office if there is no improvement or worsening of conditions or symptoms.  If s/s worsen or persist, patient will either come back or follow up with PCP.       AMANDA DENG NP ....................  5/11/2017   3:48 PM

## 2018-01-05 NOTE — PATIENT INSTRUCTIONS
Patient Information     Patient Name MRN Tasia Caba [id number] Female 1990      Patient Instructions by Joyce Bob NP at 5/10/2017 10:30 AM     Author:  Joyce Bob NP  Service:  (none) Author Type:  PHYS- Nurse Practitioner     Filed:  5/10/2017 11:00 AM  Encounter Date:  5/10/2017 Status:  Addendum     :  Joyce Bob NP (PHYS- Nurse Practitioner)        Related Notes: Original Note by Joyce Bob NP (PHYS- Nurse Practitioner) filed at 5/10/2017 10:58 AM            Stop the Bactrim and throw them away.     If by Friday you are not feeling better please call or come back in.

## 2018-01-16 ENCOUNTER — HISTORY (OUTPATIENT)
Dept: OBGYN | Facility: OTHER | Age: 28
End: 2018-01-16

## 2018-01-16 ENCOUNTER — PRENATAL OFFICE VISIT - GICH (OUTPATIENT)
Dept: OBGYN | Facility: OTHER | Age: 28
End: 2018-01-16

## 2018-01-16 DIAGNOSIS — Z34.03 ENCOUNTER FOR SUPERVISION OF NORMAL FIRST PREGNANCY IN THIRD TRIMESTER: ICD-10-CM

## 2018-01-27 VITALS
DIASTOLIC BLOOD PRESSURE: 60 MMHG | DIASTOLIC BLOOD PRESSURE: 60 MMHG | HEART RATE: 72 BPM | HEIGHT: 67 IN | WEIGHT: 178.5 LBS | WEIGHT: 181.4 LBS | BODY MASS INDEX: 28.02 KG/M2 | BODY MASS INDEX: 28.84 KG/M2 | SYSTOLIC BLOOD PRESSURE: 100 MMHG | RESPIRATION RATE: 16 BRPM | HEART RATE: 68 BPM | SYSTOLIC BLOOD PRESSURE: 116 MMHG | TEMPERATURE: 97.9 F

## 2018-01-27 VITALS
WEIGHT: 175 LBS | HEART RATE: 84 BPM | BODY MASS INDEX: 27.82 KG/M2 | DIASTOLIC BLOOD PRESSURE: 70 MMHG | WEIGHT: 181.4 LBS | SYSTOLIC BLOOD PRESSURE: 110 MMHG | BODY MASS INDEX: 28.84 KG/M2 | SYSTOLIC BLOOD PRESSURE: 118 MMHG | DIASTOLIC BLOOD PRESSURE: 62 MMHG

## 2018-01-27 VITALS
BODY MASS INDEX: 26.24 KG/M2 | DIASTOLIC BLOOD PRESSURE: 54 MMHG | HEART RATE: 84 BPM | WEIGHT: 167.2 LBS | HEIGHT: 67 IN | SYSTOLIC BLOOD PRESSURE: 108 MMHG | TEMPERATURE: 96.2 F

## 2018-01-27 VITALS
HEIGHT: 67 IN | HEART RATE: 100 BPM | DIASTOLIC BLOOD PRESSURE: 68 MMHG | WEIGHT: 168.4 LBS | BODY MASS INDEX: 26.43 KG/M2 | TEMPERATURE: 100.1 F | SYSTOLIC BLOOD PRESSURE: 110 MMHG

## 2018-01-28 ENCOUNTER — HEALTH MAINTENANCE LETTER (OUTPATIENT)
Age: 28
End: 2018-01-28

## 2018-02-01 ENCOUNTER — HISTORY (OUTPATIENT)
Dept: OBGYN | Facility: OTHER | Age: 28
End: 2018-02-01

## 2018-02-01 ENCOUNTER — PRENATAL OFFICE VISIT - GICH (OUTPATIENT)
Dept: OBGYN | Facility: OTHER | Age: 28
End: 2018-02-01

## 2018-02-01 DIAGNOSIS — O26.843 UTERINE SIZE-DATE DISCREPANCY IN THIRD TRIMESTER: ICD-10-CM

## 2018-02-01 DIAGNOSIS — Z34.03 ENCOUNTER FOR SUPERVISION OF NORMAL FIRST PREGNANCY IN THIRD TRIMESTER: ICD-10-CM

## 2018-02-03 LAB — CULTURE - HISTORICAL: NORMAL

## 2018-02-04 ASSESSMENT — PATIENT HEALTH QUESTIONNAIRE - PHQ9: SUM OF ALL RESPONSES TO PHQ QUESTIONS 1-9: 0

## 2018-02-05 ENCOUNTER — HOSPITAL ENCOUNTER (OUTPATIENT)
Dept: RADIOLOGY | Facility: OTHER | Age: 28
End: 2018-02-05

## 2018-02-05 DIAGNOSIS — O26.843 UTERINE SIZE-DATE DISCREPANCY IN THIRD TRIMESTER: ICD-10-CM

## 2018-02-06 ENCOUNTER — HISTORY (OUTPATIENT)
Dept: OBGYN | Facility: OTHER | Age: 28
End: 2018-02-06

## 2018-02-06 ENCOUNTER — PRENATAL OFFICE VISIT - GICH (OUTPATIENT)
Dept: OBGYN | Facility: OTHER | Age: 28
End: 2018-02-06

## 2018-02-06 DIAGNOSIS — Z34.03 ENCOUNTER FOR SUPERVISION OF NORMAL FIRST PREGNANCY IN THIRD TRIMESTER: ICD-10-CM

## 2018-02-08 ENCOUNTER — DOCUMENTATION ONLY (OUTPATIENT)
Dept: FAMILY MEDICINE | Facility: OTHER | Age: 28
End: 2018-02-08

## 2018-02-08 PROBLEM — Z34.02 ENCOUNTER FOR SUPERVISION OF NORMAL FIRST PREGNANCY IN SECOND TRIMESTER: Status: ACTIVE | Noted: 2017-08-28

## 2018-02-08 PROBLEM — M41.20 SCOLIOSIS (AND KYPHOSCOLIOSIS), IDIOPATHIC: Status: ACTIVE | Noted: 2018-02-08

## 2018-02-08 PROBLEM — R87.619 ABNORMAL PAP SMEAR OF CERVIX: Status: ACTIVE | Noted: 2017-11-17

## 2018-02-08 PROBLEM — O21.9 NAUSEA/VOMITING IN PREGNANCY: Status: ACTIVE | Noted: 2017-08-28

## 2018-02-08 RX ORDER — BREAST PUMP
EACH MISCELLANEOUS
COMMUNITY
Start: 2018-01-02 | End: 2020-05-12

## 2018-02-08 RX ORDER — ONDANSETRON 4 MG/1
4 TABLET, ORALLY DISINTEGRATING ORAL EVERY 8 HOURS PRN
COMMUNITY
Start: 2017-10-06 | End: 2019-09-09

## 2018-02-08 RX ORDER — ACETAMINOPHEN 325 MG/1
TABLET ORAL EVERY 4 HOURS PRN
COMMUNITY
End: 2019-09-09

## 2018-02-09 VITALS
WEIGHT: 202.5 LBS | HEART RATE: 108 BPM | SYSTOLIC BLOOD PRESSURE: 112 MMHG | BODY MASS INDEX: 32.19 KG/M2 | DIASTOLIC BLOOD PRESSURE: 74 MMHG

## 2018-02-09 VITALS
BODY MASS INDEX: 31.72 KG/M2 | DIASTOLIC BLOOD PRESSURE: 60 MMHG | HEART RATE: 108 BPM | SYSTOLIC BLOOD PRESSURE: 110 MMHG | WEIGHT: 199.5 LBS

## 2018-02-09 VITALS
WEIGHT: 196.13 LBS | HEART RATE: 100 BPM | SYSTOLIC BLOOD PRESSURE: 114 MMHG | DIASTOLIC BLOOD PRESSURE: 64 MMHG | BODY MASS INDEX: 31.18 KG/M2

## 2018-02-09 VITALS
BODY MASS INDEX: 33.07 KG/M2 | SYSTOLIC BLOOD PRESSURE: 112 MMHG | WEIGHT: 208 LBS | HEART RATE: 104 BPM | DIASTOLIC BLOOD PRESSURE: 68 MMHG

## 2018-02-09 VITALS
HEART RATE: 72 BPM | SYSTOLIC BLOOD PRESSURE: 108 MMHG | DIASTOLIC BLOOD PRESSURE: 54 MMHG | WEIGHT: 190.4 LBS | BODY MASS INDEX: 30.27 KG/M2

## 2018-02-12 NOTE — PROGRESS NOTES
Patient Information     Patient Name MRNOAH Sex Tasia Rangel [id number] Female 1990      Progress Notes by Swati Randle MD at 2017  3:30 PM     Author:  Swati Randle MD Service:  (none) Author Type:  Physician     Filed:  2017  4:17 PM Encounter Date:  2017 Status:  Signed     :  Swati Randle MD (Physician)            [facility]  Return OB Visit    S: Patient reports she has been feeling well. She denies cramping, contractions, vaginal bleeding, leaking fluid. She reports normal fetal movement. She has no other complaints.    O: /54  Pulse 72  Wt 86.4 kg (190 lb 6.4 oz)  LMP 2017 (Exact Date)  BMI 30.73 kg/m2  Gen: Well-appearing, NAD    Fundal Height:  29  FHR: 160    A/P:  Tasia Duarte is a 26 y.o.  at 27w5d by 8w6d US, here for return OB visit.  LSIL pap at new OB: needs repeat at one year    PNC:   - Prenatal labs reviewed, Rh positive, Rubella immune, GCT today  - Genetics: declined  - Imaging: dating US at 8w6d, normal anatomy at 19w4d  - Immunizations: declined flu. Tdap 2017   RTC 4 weeks    Swati Randle MD  OB/GYN  2017 3:59 PM

## 2018-02-12 NOTE — NURSING NOTE
Patient Information     Patient Name MRN Tasia Lobato [id number] Female 1990      Nursing Note by Viviana Victoria at 2018  2:30 PM     Author:  Viviana Victoria Service:  (none) Author Type:  (none)     Filed:  2018  2:49 PM Encounter Date:  2018 Status:  Signed     :  Viviana Victoria            2 Babystep coupons given.  Viviana Victoria LPN  2018  2:36 PM

## 2018-02-12 NOTE — PROGRESS NOTES
Patient Information     Patient Name MRN Sex Tasia Rangel [id number] Female 1990      Progress Notes by Ana Sanches MD at 2017 12:11 PM     Author:  Ana Sanches MD Service:  (none) Author Type:  Physician     Filed:  2017 12:11 PM Encounter Date:  2017 Status:  Signed     :  Ana Sanches MD (Physician)            Notified of results via [employer].

## 2018-02-13 NOTE — PATIENT INSTRUCTIONS
Patient Information     Patient Name MRN Tasia Lobato [id number] Female 1990      Patient Instructions by Swati Randle MD at 2018  2:30 PM     Author:  Swati Randle MD Service:  (none) Author Type:  Physician     Filed:  2018  2:59 PM Encounter Date:  2018 Status:  Signed     :  Swati Randle MD (Physician)            Labor and Delivery [phone]

## 2018-02-13 NOTE — NURSING NOTE
Patient Information     Patient Name MRN Tasia Lobato [id number] Female 1990      Nursing Note by Viviana Victoria at 2018  2:30 PM     Author:  Viviana Victoria Service:  (none) Author Type:  (none)     Filed:  2018  2:59 PM Encounter Date:  2018 Status:  Signed     :  Viviana Victoria            1 Babystep coupon given.  Viviana Victoria LPN  2018  2:50 PM

## 2018-02-13 NOTE — PROGRESS NOTES
Patient Information     Patient Name MRN Sex Tasia Rangel [id number] Female 1990      Progress Notes by Arelis Inman R.T. ([facility]) at 2018  4:02 PM     Author:  [facility] In[facility] R.T. [facility] Service:  (none) Author Type:  [employer] - Registered Radiologic Technologist     Filed:  2018  4:02 PM Date of Service:  2018  4:02 PM Status:  Signed     :  Arelis Inman R.T. ([facility]) ([facility] - Registered Radiologic Technologist)            Falls Risk Criteria:    Age 65 and older or under age 4        Sensory deficits    Poor vision    Use of ambulatory aides    Impaired judgment    Unable to walk independently    Meets High Risk criteria for falls:  no

## 2018-02-13 NOTE — PROGRESS NOTES
Patient Information     Patient Name MRN Sex Tasia Rangel [id number] Female 1990      Progress Notes by Swati Randle MD at 2018  2:30 PM     Author:  Swati Randle MD Service:  (none) Author Type:  Physician     Filed:  2018  3:02 PM Encounter Date:  2018 Status:  Signed     :  Swati Randle MD (Physician)            [facility]  Return OB Visit    S: Patient reports she has been feeling well. Occasional cramping. She denies vaginal bleeding, leaking fluid. She reports normal fetal movement. She has no other complaints.    O: /64 (Cuff Site: Right Arm, Position: Sitting, Cuff Size: Adult Large)  Pulse 100  Wt 89 kg (196 lb 2 oz)  LMP 2017 (Exact Date)  BMI 31.66 kg/m2  Gen: Well-appearing, NAD    Fundal Height:  34  FHR: 160    A/P:  Tasia Duarte is a 27 y.o.  at 33w5d by 8w6d US, here for return OB visit.  LSIL pap at new OB: needs repeat at one year  Dyspnea of pregnancy  Contraception: undecided, considering IUD vs vasectomy. Also has partner's two sons living at home with them. Written information provided  Labor pain management: does not want epidural. Encouraged patient to look into other options to help cope with pain of labor (ie medication, hypnobirthing, etc)  Plans breastfeeding: breast pump Rx 2018       PNC:   - Prenatal labs reviewed, Rh positive, Rubella immune, GCT 87  - Genetics: declined  - Imaging: dating US at 8w6d, normal anatomy at 19w4d  - Immunizations: declined flu. Tdap 2017   RTC 2 weeks- GBS next visit    Swati Randle MD  OB/GYN  2018 2:54 PM

## 2018-02-13 NOTE — PROGRESS NOTES
Patient Information     Patient Name MRN Tasia Lobato [id number] Female 1990      Progress Notes by Swati Randle MD at 2018  8:45 AM     Author:  Swati Randle MD Service:  (none) Author Type:  Physician     Filed:  2018  8:52 AM Encounter Date:  2018 Status:  Signed     :  Swati Randle MD (Physician)            [facility]  Return OB Visit    S: Patient reports she has been feeling well. Occasional contractions at work. She denies vaginal bleeding, leaking fluid. She reports normal fetal movement. She has no other complaints.    O: /74 (Cuff Site: Right Arm, Position: Sitting, Cuff Size: Adult Large)  Pulse (!) 108  Wt 91.9 kg (202 lb 8 oz)  LMP 2017 (Exact Date)  BMI 32.68 kg/m2  Gen: Well-appearing, NAD  Cervix: 0/long/high. Cephalic on cervical exam    Fundal Height:  34  FHR: 150    A/P:  Tasia Duarte is a 27 y.o.  at 36w0d by 8w6d US, here for return OB visit.  LSIL pap at new OB: needs repeat at one year  Contraception: undecided, considering IUD vs vasectomy. Also has partner's two sons living at home with them. Written information provided  Labor pain management: does not want epidural. Encouraged patient to look into other options to help cope with pain of labor (ie medication, hypnobirthing, etc)  Plans breastfeeding: breast pump Rx 2018   S<D: no fundal height growth since last visit, will get growth US before next appt      PNC:   - Prenatal labs reviewed, Rh positive, Rubella immune, GCT 87. GBS 2018   - Genetics: declined  - Imaging: dating US at 8w6d, normal anatomy at 19w4d  - Immunizations: declined flu. Tdap 2017   RTC weekly until delivery    Swati Randle MD  OB/GYN  2018 8:48 AM

## 2018-02-13 NOTE — PROGRESS NOTES
Patient Information     Patient Name MRN Sex Tasia Rangel [id number] Female 1990      Progress Notes by Swati Randle MD at 2018  2:30 PM     Author:  Swati Randle MD Service:  (none) Author Type:  Physician     Filed:  2018  2:55 PM Encounter Date:  2018 Status:  Signed     :  Swati Randle MD (Physician)            [facility]  Return OB Visit    S: Patient reports she has been feeling well. She denies cramping, contractions, vaginal bleeding, leaking fluid. She reports normal fetal movement. She has no other complaints.    O: /68 (Cuff Site: Right Arm, Position: Sitting, Cuff Size: Adult Large)  Pulse (!) 104  Wt 94.3 kg (208 lb)  LMP 2017 (Exact Date)  BMI 33.57 kg/m2  Gen: Well-appearing, NAD    Fundal Height:  36  FHR: 155    A/P:  Tasia Melgar is a 27 y.o.  at 36w5d by 8w6d US, here for return OB visit.  LSIL pap at new OB: needs repeat at one year  Contraception: undecided, considering IUD vs vasectomy. Also has partner's two sons living at home with them. Written information provided  Labor pain management: does not want epidural.   Plans breastfeeding: breast pump Rx 2018   S<D: growth US 18 2922g (63%ile)      PNC:   - Prenatal labs reviewed, Rh positive, Rubella immune, GCT 87. GBS negative  - Genetics: declined  - Imaging: dating US at 8w6d, normal anatomy at 19w4d  - Immunizations: declined flu. Tdap 2017   RTC weekly until delivery    Swati Randle MD  OB/GYN  2018 2:45 PM

## 2018-02-15 ENCOUNTER — DOCUMENTATION ONLY (OUTPATIENT)
Dept: FAMILY MEDICINE | Facility: OTHER | Age: 28
End: 2018-02-15

## 2018-02-15 ENCOUNTER — PRENATAL OFFICE VISIT (OUTPATIENT)
Dept: OBGYN | Facility: OTHER | Age: 28
End: 2018-02-15
Attending: OBSTETRICS & GYNECOLOGY
Payer: COMMERCIAL

## 2018-02-15 VITALS
BODY MASS INDEX: 33.39 KG/M2 | HEART RATE: 108 BPM | DIASTOLIC BLOOD PRESSURE: 76 MMHG | SYSTOLIC BLOOD PRESSURE: 126 MMHG | WEIGHT: 210 LBS

## 2018-02-15 DIAGNOSIS — Z34.03 ENCOUNTER FOR SUPERVISION OF NORMAL FIRST PREGNANCY IN THIRD TRIMESTER: Primary | ICD-10-CM

## 2018-02-15 PROCEDURE — 99207 ZZC OB VISIT-NO CHARGE - GICH ONLY: CPT | Performed by: OBSTETRICS & GYNECOLOGY

## 2018-02-15 ASSESSMENT — PAIN SCALES - GENERAL: PAINLEVEL: NO PAIN (0)

## 2018-02-15 NOTE — MR AVS SNAPSHOT
"              After Visit Summary   2/15/2018    Tasia Melgar    MRN: [id number]           Patient Information     Date Of Birth          1990        Visit Information        Provider Department      2/15/2018 2:30 PM Swati Randle MD [facility]        Today's Diagnoses     Encounter for supervision of normal first pregnancy in third trimester    -  1       Follow-ups after your visit        Your next 10 appointments already scheduled     Feb 20, 2018  2:30 PM CST   ESTABLISHED PRENATAL with Swati Randle MD   [facility]    [address]  [place] [address]   [phone]            Feb 27, 2018  2:30 PM CST   ESTABLISHED PRENATAL with Swati Randle MD   [facility] ([facility])    [address]  [place] [address]   [phone]              Who to contact     If you have questions or need follow up information about today's clinic visit or your schedule please contact [facility] directly at [phone].  Normal or non-critical lab and imaging results will be communicated to you by [employer], letter or phone within 4 business days after the clinic has received the results. If you do not hear from us within 7 days, please contact the clinic through [email or web address] or phone. If you have a critical or abnormal lab result, we will notify you by phone as soon as possible.  Submit refill requests through [employer] or call your pharmacy and they will forward the refill request to us. Please allow 3 business days for your refill to be completed.          Additional Information About Your Visit        [employer] Information     [employer] lets you send messages to your doctor, view your test results, renew your prescriptions, schedule appointments and more. To sign up, go to [email or web address] . Click on \"Log in\" on the left side of the " "screen, which will take you to the Welcome page. Then click on \"Sign up Now\" on the right side of the page.     You will be asked to enter the access code listed below, as well as some personal information. Please follow the directions to create your username and password.     Your access code is: [email or web address]  Expires: 2018  3:05 PM     Your access code will  in 90 days. If you need help or a new code, please call your [place] clinic or [phone].        Care EveryWhere ID     This is your Care EveryWhere ID. This could be used by other organizations to access your [place] medical records  [id number]        Your Vitals Were     Pulse BMI (Body Mass Index)                108 33.39 kg/m2           Blood Pressure from Last 3 Encounters:   02/15/18 126/76   18 112/68   18 112/74    Weight from Last 3 Encounters:   02/15/18 95.3 kg (210 lb)   18 94.3 kg (208 lb)   18 91.9 kg (202 lb 8 oz)              Today, you had the following     No orders found for display       Primary Care Provider Office Phone # Fax #    Ana Sanches [phone]       [address] [facility] [address]        Equal Access to Services     [facility] : Hadii grecia kinsey hadasho Soomaali, waaxda luqadaha, qaybta kaalmada zoraida, jo javier. So [facility] [phone].    ATENCIÓN: Si habla español, tiene a caban disposición servicios gratuitos de asistencia lingüística. Llame al [phone].    We comply with applicable federal civil rights laws and [place] laws. We do not discriminate on the basis of race, color, national origin, age, disability, sex, sexual orientation, or gender identity.            Thank you!     Thank you for choosing [facility] AND [facility]  for your care. Our goal is always to provide you with excellent care. Hearing back from our patients is one way we can continue to improve our services. Please take a few minutes to " complete the written survey that you may receive in the mail after your visit with us. Thank you!             Your Updated Medication List - Protect others around you: Learn how to safely use, store and throw away your medicines at www.disposemymeds.org.          This list is accurate as of 2/15/18  3:05 PM.  Always use your most recent med list.                   Brand Name Dispense Instructions for use Diagnosis    acetaminophen 325 MG tablet    TYLENOL     Take by mouth every 4 hours as needed . Max acetaminophen dose: 4,000 mg per 24 hours.        breast pump Misc      For home use. Anticipated Gestation age at delivery: 40 weeks. Reason for need: breastfeeding. Length of need: 1 year        ondansetron 4 MG ODT tab    ZOFRAN-ODT     Place 4 mg under the tongue every 8 hours as needed for nausea or vomiting

## 2018-02-15 NOTE — NURSING NOTE
Patient presents today for her 38w0d prenatal check.  Viviana Victoria LPN  2/15/2018  2:45 PM

## 2018-02-15 NOTE — PROGRESS NOTES
Return OB Visit    S: Patient reports she has been overall feeling well. More cramping over the last few days. No VB or LOF. +FM.    O: /76 (BP Location: Right arm, Patient Position: Sitting, Cuff Size: Adult Regular)  Pulse 108  Wt 95.3 kg (210 lb)  BMI 33.39 kg/m2  Gen: Well-appearing, NAD  See OB Flowsheet    A/P:  Tasia Melgar is a 27 year old  at 38w0d by 8w6d US, here for return OB visit.  LSIL pap at new OB: needs repeat at one year  Contraception: undecided, considering IUD  Labor pain management: does not want epidural.   Plans breastfeeding: breast pump Rx 2018    on call  S<D: growth US 18 2922g (63%ile)      PNC:   - Prenatal labs reviewed, Rh positive, Rubella immune, GCT 87. GBS negative  - Genetics: declined  - Imaging: dating US at 8w6d, normal anatomy at 19w4d  - Immunizations: declined flu. Tdap 2017   RTC weekly until delivery    Swati Randle MD  OB/GYN  2/15/2018 3:03 PM

## 2018-02-19 ENCOUNTER — DOCUMENTATION ONLY (OUTPATIENT)
Dept: FAMILY MEDICINE | Facility: OTHER | Age: 28
End: 2018-02-19

## 2018-02-20 ENCOUNTER — PRENATAL OFFICE VISIT (OUTPATIENT)
Dept: OBGYN | Facility: OTHER | Age: 28
End: 2018-02-20
Attending: OBSTETRICS & GYNECOLOGY
Payer: COMMERCIAL

## 2018-02-20 VITALS
BODY MASS INDEX: 33.86 KG/M2 | WEIGHT: 213 LBS | SYSTOLIC BLOOD PRESSURE: 122 MMHG | DIASTOLIC BLOOD PRESSURE: 78 MMHG | HEART RATE: 100 BPM

## 2018-02-20 DIAGNOSIS — Z34.03 ENCOUNTER FOR SUPERVISION OF NORMAL FIRST PREGNANCY IN THIRD TRIMESTER: Primary | ICD-10-CM

## 2018-02-20 PROCEDURE — 99207 ZZC OB VISIT-NO CHARGE - GICH ONLY: CPT | Performed by: OBSTETRICS & GYNECOLOGY

## 2018-02-20 ASSESSMENT — PAIN SCALES - GENERAL: PAINLEVEL: NO PAIN (0)

## 2018-02-20 NOTE — MR AVS SNAPSHOT
"              After Visit Summary   2/20/2018    Tasia Melgar    MRN: [id number]           Patient Information     Date Of Birth          1990        Visit Information        Provider Department      2/20/2018 2:30 PM Swati Randle MD [facility]        Today's Diagnoses     Encounter for supervision of normal first pregnancy in third trimester    -  1       Follow-ups after your visit        Your next 10 appointments already scheduled     Feb 27, 2018  2:30 PM CST   ESTABLISHED PRENATAL with Swati Randle MD   [facility]    [address]  [place] [address] [phone]              Who to contact     If you have questions or need follow up information about today's clinic visit or your schedule please contact [facility] directly at [phone].  Normal or non-critical lab and imaging results will be communicated to you by [employer], letter or phone within 4 business days after the clinic has received the results. If you do not hear from us within 7 days, please contact the clinic through [employer] or phone. If you have a critical or abnormal lab result, we will notify you by phone as soon as possible.  Submit refill requests through [employer] or call your pharmacy and they will forward the refill request to us. Please allow 3 business days for your refill to be completed.          Additional Information About Your Visit        MyChart Information     [employer] lets you send messages to your doctor, view your test results, renew your prescriptions, schedule appointments and more. To sign up, go to [email or web address] . Click on \"Log in\" on the left side of the screen, which will take you to the Welcome page. Then click on \"Sign up Now\" on the right side of the page.     You will be asked to enter the access code listed below, as well as some personal information. Please follow the " directions to create your username and password.     Your access code is: [email or web address]  Expires: 2018  3:05 PM     Your access code will  in 90 days. If you need help or a new code, please call your [place] clinic or [phone].        Care EveryWhere ID     This is your Care EveryWhere ID. This could be used by other organizations to access your [place] medical records  [id number]        Your Vitals Were     Pulse BMI (Body Mass Index)                100 33.86 kg/m2           Blood Pressure from Last 3 Encounters:   18 122/78   02/15/18 126/76   18 112/68    Weight from Last 3 Encounters:   18 96.6 kg (213 lb)   02/15/18 95.3 kg (210 lb)   18 94.3 kg (208 lb)              Today, you had the following     No orders found for display       Primary Care Provider Office Phone # Fax #    Ana Sanches [phone] [address] [facility] [address]        Equal Access to Services     [facility]: Hadii aad ku hadsamio Sofaye, waaxda luron, qaybta kaalmatrinidad conway, jo elaine . So [facility] [phone].    ATENCIÓN: Si habla español, tiene a caban disposición servicios gratuitos de asistencia lingüística. Ll[facility] [phone].    We comply with applicable federal civil rights laws and [place] laws. We do not discriminate on the basis of race, color, national origin, age, disability, sex, sexual orientation, or gender identity.            Thank you!     Thank you for choosing [facility] AND [facility]  for your care. Our goal is always to provide you with excellent care. Hearing back from our patients is one way we can continue to improve our services. Please take a few minutes to complete the written survey that you may receive in the mail after your visit with us. Thank you!             Your Updated Medication List - Protect others around you: Learn how to safely use, store and throw away your medicines at  www.disposemymeds.org.          This list is accurate as of 2/20/18  2:50 PM.  Always use your most recent med list.                   Brand Name Dispense Instructions for use Diagnosis    acetaminophen 325 MG tablet    TYLENOL     Take by mouth every 4 hours as needed . Max acetaminophen dose: 4,000 mg per 24 hours.        breast pump Misc      For home use. Anticipated Gestation age at delivery: 40 weeks. Reason for need: breastfeeding. Length of need: 1 year        ondansetron 4 MG ODT tab    ZOFRAN-ODT     Place 4 mg under the tongue every 8 hours as needed for nausea or vomiting

## 2018-02-20 NOTE — NURSING NOTE
Patient presents today in the clinic for a prenatal visit. She is 38w5d.  Viviana Victoria LPN  2/20/2018  2:38 PM

## 2018-02-20 NOTE — PROGRESS NOTES
Return OB Visit    S: Patient has been feeling well. Noticing more cramping but only few contractions. No VB or LOF. +FM    O: /78 (BP Location: Right arm, Patient Position: Sitting, Cuff Size: Adult Regular)  Pulse 100  Wt 96.6 kg (213 lb)  BMI 33.86 kg/m2  Gen: Well-appearing, NAD  See OB Flowsheet    A/P:  Tasia Melgar is a 27 year old  at 38w5d by 8w6d US, here for return OB visit.  LSIL pap at new OB: needs repeat at one year  Contraception: undecided, considering IUD  Labor pain management: does not want epidural.   Plans breastfeeding: breast pump Rx 2018    on call  S<D: growth US 18 2922g (63%ile)      PNC:   - Prenatal labs reviewed, Rh positive, Rubella immune, GCT 87. GBS negative  - Genetics: declined  - Imaging: dating US at 8w6d, normal anatomy at 19w4d  - Immunizations: declined flu. Tdap 2017   RTC weekly until delivery    Swati Randle MD  OB/GYN  2018 2:49 PM

## 2018-02-27 ENCOUNTER — PRENATAL OFFICE VISIT (OUTPATIENT)
Dept: OBGYN | Facility: OTHER | Age: 28
End: 2018-02-27
Attending: OBSTETRICS & GYNECOLOGY
Payer: COMMERCIAL

## 2018-02-27 VITALS
HEART RATE: 100 BPM | DIASTOLIC BLOOD PRESSURE: 78 MMHG | WEIGHT: 215.19 LBS | BODY MASS INDEX: 34.21 KG/M2 | SYSTOLIC BLOOD PRESSURE: 128 MMHG

## 2018-02-27 DIAGNOSIS — Z34.03 ENCOUNTER FOR SUPERVISION OF NORMAL FIRST PREGNANCY IN THIRD TRIMESTER: Primary | ICD-10-CM

## 2018-02-27 PROCEDURE — 99207 ZZC OB VISIT-NO CHARGE - GICH ONLY: CPT | Performed by: OBSTETRICS & GYNECOLOGY

## 2018-02-27 ASSESSMENT — PAIN SCALES - GENERAL: PAINLEVEL: NO PAIN (0)

## 2018-02-27 NOTE — PROGRESS NOTES
Return OB Visit    S: Patient has been feeling okay. Had increased ctx 3 days ago, now only a few. Had some spotting after her ctx, none since. No LOF. +FM.    O: /78 (BP Location: Right arm, Patient Position: Sitting, Cuff Size: Adult Large)  Pulse 100  Wt 97.6 kg (215 lb 3 oz)  BMI 34.21 kg/m2  Gen: Well-appearing, NAD  Cvx: , medium consistency, posterior. Membranes stripped.  See OB Flowsheet    A/P:  Tasia Melgar is a 27 year old  at 39w5d by 8w6d US, here for return OB visit.  LSIL pap at new OB: needs repeat at one year  Contraception: undecided, considering IUD  Labor pain management: does not want epidural.   Plans breastfeeding: breast pump Rx 2018   [place] on call  S<D: growth US 18 2922g (63%ile)      PNC:   - Prenatal labs reviewed, Rh positive, Rubella immune, GCT 87. GBS negative  - Genetics: declined  - Imaging: dating US at 8w6d, normal anatomy at 19w4d  - Immunizations: declined flu. Tdap 2017   RTC weekly until delivery    Swati Randle MD  OB/GYN  2018 3:29 PM

## 2018-02-27 NOTE — MR AVS SNAPSHOT
"              After Visit Summary   2/27/2018    Tasia Melgar    MRN: [id number]           Patient Information     Date Of Birth          1990        Visit Information        Provider Department      2/27/2018 2:30 PM Swati Randle MD [facility]         Follow-ups after your visit        Your next 10 appointments already scheduled     Mar 07, 2018  4:00 PM CST   ESTABLISHED PRENATAL with Swati Randle MD   [facility] ([facility])    [address]  [place] [address] [phone]              Who to contact     If you have questions or need follow up information about today's clinic visit or your schedule please contact [facility] directly at [phone].  Normal or non-critical lab and imaging results will be communicated to you by MyChart, letter or phone within 4 business days after the clinic has received the results. If you do not hear from us within 7 days, please contact the clinic through MyChart or phone. If you have a critical or abnormal lab result, we will notify you by phone as soon as possible.  Submit refill requests through [employer] or call your pharmacy and they will forward the refill request to us. Please allow 3 business days for your refill to be completed.          Additional Information About Your Visit        [employer] Information     The [employer]odo lets you send messages to your doctor, view your test results, renew your prescriptions, schedule appointments and more. To sign up, go to [email or web address] . Click on \"Log in\" on the left side of the screen, which will take you to the Welcome page. Then click on \"Sign up Now\" on the right side of the page.     You will be asked to enter the access code listed below, as well as some personal information. Please follow the directions to create your username and password.     Your access code is: [email or web address]  Expires: 5/16/2018  " 3:05 PM     Your access code will  in 90 days. If you need help or a new code, please call your [facility] or [phone].        Care EveryWhere ID     This is your Care EveryWhere ID. This could be used by other organizations to access your [facility] medical records  [id number]        Your Vitals Were     Pulse BMI (Body Mass Index)                100 34.21 kg/m2           Blood Pressure from Last 3 Encounters:   18 128/78   18 122/78   02/15/18 126/76    Weight from Last 3 Encounters:   18 97.6 kg (215 lb 3 oz)   18 96.6 kg (213 lb)   02/15/18 95.3 kg (210 lb)              Today, you had the following     No orders found for display       Primary Care Provider Office Phone # Fax #    Ana Sanches [phone]       [address] [facility] [address]        Equal Access to Services     [facility]: Hadii aad ku hadasho Soomaali, waaxda luqadaha, qaybta kaalmada adeegyada, waxay yesicain hayjuliusn victoria elaine . So [facility] [phone].    ATENCIÓN: Si habla español, tiene a caban disposición servicios gratuitos de asistencia lingüística. Llame al [phone].    We comply with applicable federal civil rights laws and [place] laws. We do not discriminate on the basis of race, color, national origin, age, disability, sex, sexual orientation, or gender identity.            Thank you!     Thank you for choosing [facility]  for your care. Our goal is always to provide you with excellent care. Hearing back from our patients is one way we can continue to improve our services. Please take a few minutes to complete the written survey that you may receive in the mail after your visit with us. Thank you!             Your Updated Medication List - Protect others around you: Learn how to safely use, store and throw away your medicines at www.disposemymeds.org.          This list is accurate as of 18  3:09 PM.  Always use your most  recent med list.                   Brand Name Dispense Instructions for use Diagnosis    acetaminophen 325 MG tablet    TYLENOL     Take by mouth every 4 hours as needed . Max acetaminophen dose: 4,000 mg per 24 hours.        breast pump Misc      For home use. Anticipated Gestation age at delivery: 40 weeks. Reason for need: breastfeeding. Length of need: 1 year        ondansetron 4 MG ODT tab    ZOFRAN-ODT     Place 4 mg under the tongue every 8 hours as needed for nausea or vomiting

## 2018-02-27 NOTE — NURSING NOTE
Patient presents today for her prenatal check. She is 39w5d today.  Viviana Victoria LPN  2/27/2018  2:36 PM

## 2018-03-03 ENCOUNTER — HOSPITAL ENCOUNTER (INPATIENT)
Facility: OTHER | Age: 28
LOS: 3 days | Discharge: HOME | End: 2018-03-06
Attending: OBSTETRICS & GYNECOLOGY | Admitting: OBSTETRICS & GYNECOLOGY
Payer: COMMERCIAL

## 2018-03-03 DIAGNOSIS — D62 ANEMIA DUE TO BLOOD LOSS, ACUTE: ICD-10-CM

## 2018-03-03 PROBLEM — Z36.89 ENCOUNTER FOR TRIAGE IN PREGNANT PATIENT: Status: ACTIVE | Noted: 2018-03-03

## 2018-03-03 LAB
ABO + RH BLD: NORMAL
ABO + RH BLD: NORMAL
BASOPHILS # BLD AUTO: 0 10E9/L (ref 0–0.2)
BASOPHILS NFR BLD AUTO: 0.2 %
BLD GP AB SCN SERPL QL: NORMAL
BLOOD BANK CMNT PATIENT-IMP: NORMAL
DIFFERENTIAL METHOD BLD: ABNORMAL
EOSINOPHIL # BLD AUTO: 0.1 10E9/L (ref 0–0.7)
EOSINOPHIL NFR BLD AUTO: 0.8 %
ERYTHROCYTE [DISTWIDTH] IN BLOOD BY AUTOMATED COUNT: 15.5 % (ref 10–15)
HCT VFR BLD AUTO: 33.7 % (ref 35–47)
HGB BLD-MCNC: 11 G/DL (ref 11.7–15.7)
IMM GRANULOCYTES # BLD: 0.1 10E9/L (ref 0–0.4)
IMM GRANULOCYTES NFR BLD: 0.5 %
LYMPHOCYTES # BLD AUTO: 2.4 10E9/L (ref 0.8–5.3)
LYMPHOCYTES NFR BLD AUTO: 14.3 %
MCH RBC QN AUTO: 23.9 PG (ref 26.5–33)
MCHC RBC AUTO-ENTMCNC: 32.6 G/DL (ref 31.5–36.5)
MCV RBC AUTO: 73 FL (ref 78–100)
MONOCYTES # BLD AUTO: 1.3 10E9/L (ref 0–1.3)
MONOCYTES NFR BLD AUTO: 7.9 %
NEUTROPHILS # BLD AUTO: 12.8 10E9/L (ref 1.6–8.3)
NEUTROPHILS NFR BLD AUTO: 76.3 %
PLATELET # BLD AUTO: 302 10E9/L (ref 150–450)
RBC # BLD AUTO: 4.61 10E12/L (ref 3.8–5.2)
SPECIMEN EXP DATE BLD: NORMAL
WBC # BLD AUTO: 16.8 10E9/L (ref 4–11)

## 2018-03-03 PROCEDURE — 86901 BLOOD TYPING SEROLOGIC RH(D): CPT | Performed by: OBSTETRICS & GYNECOLOGY

## 2018-03-03 PROCEDURE — 86850 RBC ANTIBODY SCREEN: CPT | Performed by: OBSTETRICS & GYNECOLOGY

## 2018-03-03 PROCEDURE — 12000027 ZZH R&B OB

## 2018-03-03 PROCEDURE — 85025 COMPLETE CBC W/AUTO DIFF WBC: CPT | Performed by: OBSTETRICS & GYNECOLOGY

## 2018-03-03 PROCEDURE — 86900 BLOOD TYPING SEROLOGIC ABO: CPT | Performed by: OBSTETRICS & GYNECOLOGY

## 2018-03-03 RX ORDER — OXYTOCIN 10 [USP'U]/ML
10 INJECTION, SOLUTION INTRAMUSCULAR; INTRAVENOUS
Status: DISCONTINUED | OUTPATIENT
Start: 2018-03-03 | End: 2018-03-06 | Stop reason: CLARIF

## 2018-03-03 RX ORDER — LIDOCAINE 40 MG/G
CREAM TOPICAL
Status: DISCONTINUED | OUTPATIENT
Start: 2018-03-03 | End: 2018-03-06 | Stop reason: CLARIF

## 2018-03-03 RX ORDER — IBUPROFEN 400 MG/1
800 TABLET, FILM COATED ORAL
Status: COMPLETED | OUTPATIENT
Start: 2018-03-03 | End: 2018-03-04

## 2018-03-03 RX ORDER — ONDANSETRON 2 MG/ML
4 INJECTION INTRAMUSCULAR; INTRAVENOUS EVERY 6 HOURS PRN
Status: DISCONTINUED | OUTPATIENT
Start: 2018-03-03 | End: 2018-03-04

## 2018-03-03 RX ORDER — NALBUPHINE HYDROCHLORIDE 20 MG/ML
10-20 INJECTION, SOLUTION INTRAMUSCULAR; INTRAVENOUS; SUBCUTANEOUS
Status: COMPLETED | OUTPATIENT
Start: 2018-03-03 | End: 2018-03-04

## 2018-03-03 RX ORDER — ACETAMINOPHEN 325 MG/1
650 TABLET ORAL EVERY 4 HOURS PRN
Status: DISCONTINUED | OUTPATIENT
Start: 2018-03-03 | End: 2018-03-04

## 2018-03-03 RX ORDER — NALOXONE HYDROCHLORIDE 0.4 MG/ML
.1-.4 INJECTION, SOLUTION INTRAMUSCULAR; INTRAVENOUS; SUBCUTANEOUS
Status: DISCONTINUED | OUTPATIENT
Start: 2018-03-03 | End: 2018-03-04

## 2018-03-03 RX ORDER — OXYCODONE AND ACETAMINOPHEN 5; 325 MG/1; MG/1
1 TABLET ORAL
Status: DISCONTINUED | OUTPATIENT
Start: 2018-03-03 | End: 2018-03-06 | Stop reason: HOSPADM

## 2018-03-03 RX ORDER — CARBOPROST TROMETHAMINE 250 UG/ML
250 INJECTION, SOLUTION INTRAMUSCULAR
Status: DISCONTINUED | OUTPATIENT
Start: 2018-03-03 | End: 2018-03-06 | Stop reason: HOSPADM

## 2018-03-03 RX ORDER — METHYLERGONOVINE MALEATE 0.2 MG/ML
200 INJECTION INTRAVENOUS
Status: DISCONTINUED | OUTPATIENT
Start: 2018-03-03 | End: 2018-03-06 | Stop reason: HOSPADM

## 2018-03-03 RX ORDER — SODIUM CHLORIDE, SODIUM LACTATE, POTASSIUM CHLORIDE, CALCIUM CHLORIDE 600; 310; 30; 20 MG/100ML; MG/100ML; MG/100ML; MG/100ML
INJECTION, SOLUTION INTRAVENOUS CONTINUOUS
Status: DISCONTINUED | OUTPATIENT
Start: 2018-03-03 | End: 2018-03-06 | Stop reason: CLARIF

## 2018-03-03 NOTE — IP AVS SNAPSHOT
MRN:[id number]                      After Visit Summary   3/3/2018    Tasia Melgar    MRN: [id number]           Thank you!     Thank you for choosing [facility] for your care. Our goal is always to provide you with excellent care. Hearing back from our patients is one way we can continue to improve our services. Please take a few minutes to complete the written survey that you may receive in the mail after you visit with us. Thank you!        Patient Information     Date Of Birth          1990        Designated Caregiver       Most Recent Value    Caregiver    Will someone help with your care after discharge? yes    Name of designated caregiver Sirena Traore    Phone number of caregiver [phone]    Caregiver address same as patient       About your hospital stay     You were admitted on:  March 3, 2018 You last received care in the:  [facility]    You were discharged on:  March 6, 2018        Reason for your hospital stay       Maternity care                  Who to Call     For medical emergencies, please call 911.  For non-urgent questions about your medical care, please call your primary care provider or clinic, [phone]          Attending Provider     Provider Specialty    Sarwat Sanz MD OB/Gyn       Primary Care Provider Office Phone # Fax #    Ana Sanches [phone]      After Care Instructions     Activity       Review discharge instructions            Diet       Resume previous diet            Discharge Instructions - Postpartum visit       Schedule postpartum visit and IUD insertion with your provider and return to clinic in 6 weeks.                  Your next 10 appointments already scheduled     Mar 07, 2018  9:00 AM CST   Office Visit with  LACTATION NURSE   [facility] ([facility])    [address]  [place] [id number] [phone]           Bring a current  "list of meds and any records pertaining to this visit. For Physicals, please bring immunization records and any forms needing to be filled out. Please arrive 10 minutes early to complete paperwork.            Apr 17, 2018 11:15 AM CDT   Post Partum with Swati Randle MD    ()    [address]  [place] [address]   [phone]              Further instructions from your care team       There is a Lactation appointment scheduled for you and baby Wednesday March 7 at 9:00. Please report to the Diagnostics window 15 minutes prior to you scheduled appointment time. There is an post partum appointment scheduled with Dr. Randle for Tuesday April 17 at 11:15.     Pending Results     Date and Time Order Name Status Description    3/4/2018 1009 Surgical Path Exam In process             Statement of Approval     Ordered          03/06/18 0741  I have reviewed and agree with all the recommendations and orders detailed in this document.  EFFECTIVE NOW     Approved and electronically signed by:  Swati Randle MD             Admission Information     Date & Time Provider Department Dept. Phone    3/3/2018 Sarwat Sanz MD  [phone]      Your Vitals Were     Blood Pressure Pulse Temperature Respirations Pulse Oximetry       126/78 86 98.3  F (36.8  C) (Oral) 18 98%       MyChart Information     [employer] cor[employer]art lets you send messages to your doctor, view your test results, renew your prescriptions, schedule appointments and more. To sign up, go to [email or web address] . Click on \"Log in\" on the left side of the screen, which will take you to the Welcome page. Then click on \"Sign up Now\" on the right side of the page.     You will be asked to enter the access code listed below, as well as some personal information. Please follow the directions to create your username and password.     Your access code is: " [email or web address]  Expires: 2018  3:05 PM     Your access code will  in 90 days. If you need help or a new code, please call your [facility] clinic or [phone].        Care EveryWhere ID     This is your Care EveryWhere ID. This could be used by other organizations to access your [facility] medical records  [id number]        Equal Access to Services     [facility] : Hadii aad ku hadasho Soomaali, waaxda luqadaha, qaybta kaalmada adeegyada, waxay idiin hayjuliusn adeadi barrientos laSheelaisaiah . So [facility] [phone].    ATENCIÓN: Si habla espariane, tiene a caban disposición servicios gratuitos de asistencia lingüística. Llame al [phone].    We comply with applicable federal civil rights laws and [place] laws. We do not discriminate on the basis of race, color, national origin, age, disability, sex, sexual orientation, or gender identity.               Review of your medicines      START taking        Dose / Directions    docusate sodium 100 MG tablet   Commonly known as:  COLACE        Dose:  100 mg   Take 100 mg by mouth daily   Quantity:  100 tablet   Refills:  1       ferrous sulfate 325 (65 FE) MG tablet   Commonly known as:  IRON   Used for:  Anemia due to blood loss, acute        Dose:  325 mg   Take 1 tablet (325 mg) by mouth daily (with breakfast)   Quantity:  90 tablet   Refills:  1       ibuprofen 600 MG tablet   Commonly known as:  ADVIL/MOTRIN        Dose:  600 mg   Take 1 tablet (600 mg) by mouth every 6 hours as needed for moderate pain   Quantity:  30 tablet   Refills:  0         CONTINUE these medicines which have NOT CHANGED        Dose / Directions    acetaminophen 325 MG tablet   Commonly known as:  TYLENOL        Take by mouth every 4 hours as needed . Max acetaminophen dose: 4,000 mg per 24 hours.   Refills:  0       breast pump Misc        For home use. Anticipated Gestation age at delivery: 40 weeks. Reason for need: breastfeeding. Length of need: 1 year   Refills:  0       ondansetron 4 MG ODT  tab   Commonly known as:  ZOFRAN-ODT        Dose:  4 mg   Place 4 mg under the tongue every 8 hours as needed for nausea or vomiting   Refills:  0            Where to get your medicines      These medications were sent to [facility] [id number] [facility] - [place], [place] - [address]  [address] [place] [address]     Phone:  [phone]     docusate sodium 100 MG tablet    ferrous sulfate 325 (65 FE) MG tablet    ibuprofen 600 MG tablet                Protect others around you: Learn how to safely use, store and throw away your medicines at www.disposemymeds.org.             Medication List: This is a list of all your medications and when to take them. Check marks below indicate your daily home schedule. Keep this list as a reference.      Medications           Morning Afternoon Evening Bedtime As Needed    acetaminophen 325 MG tablet   Commonly known as:  TYLENOL   Take by mouth every 4 hours as needed . Max acetaminophen dose: 4,000 mg per 24 hours.   Last time this was given:  650 mg on 3/5/2018  8:36 PM                                breast pump Misc   For home use. Anticipated Gestation age at delivery: 40 weeks. Reason for need: breastfeeding. Length of need: 1 year                                docusate sodium 100 MG tablet   Commonly known as:  COLACE   Take 100 mg by mouth daily                                ferrous sulfate 325 (65 FE) MG tablet   Commonly known as:  IRON   Take 1 tablet (325 mg) by mouth daily (with breakfast)                                ibuprofen 600 MG tablet   Commonly known as:  ADVIL/MOTRIN   Take 1 tablet (600 mg) by mouth every 6 hours as needed for moderate pain   Last time this was given:  800 mg on 3/6/2018  6:12 AM                                ondansetron 4 MG ODT tab   Commonly known as:  ZOFRAN-ODT   Place 4 mg under the tongue every 8 hours as needed for nausea or vomiting

## 2018-03-03 NOTE — IP AVS SNAPSHOT
[facility]    [address]    [place] [address]    Phone:  [phone]    Fax:  [phone]                                       After Visit Summary   3/3/2018    Tasia Melgar    MRN: [id number]           After Visit Summary Signature Page     I have received my discharge instructions, and my questions have been answered. I have discussed any challenges I see with this plan with the nurse or doctor.    ..........................................................................................................................................  Patient/Patient Representative Signature      ..........................................................................................................................................  Patient Representative Print Name and Relationship to Patient    ..................................................               ................................................  Date                                            Time    ..........................................................................................................................................  Reviewed by Signature/Title    ...................................................              ..............................................  Date                                                            Time

## 2018-03-04 ENCOUNTER — ANESTHESIA EVENT (OUTPATIENT)
Dept: OBGYN | Facility: OTHER | Age: 28
End: 2018-03-04

## 2018-03-04 ENCOUNTER — ANESTHESIA (OUTPATIENT)
Dept: OBGYN | Facility: OTHER | Age: 28
End: 2018-03-04

## 2018-03-04 PROCEDURE — 72200001 ZZH LABOR CARE VAGINAL DELIVERY SINGLE

## 2018-03-04 PROCEDURE — 25000132 ZZH RX MED GY IP 250 OP 250 PS 637: Performed by: OBSTETRICS & GYNECOLOGY

## 2018-03-04 PROCEDURE — 25000128 H RX IP 250 OP 636: Performed by: NURSE ANESTHETIST, CERTIFIED REGISTERED

## 2018-03-04 PROCEDURE — 25000125 ZZHC RX 250: Performed by: OBSTETRICS & GYNECOLOGY

## 2018-03-04 PROCEDURE — 12000027 ZZH R&B OB

## 2018-03-04 PROCEDURE — 25000128 H RX IP 250 OP 636: Performed by: OBSTETRICS & GYNECOLOGY

## 2018-03-04 PROCEDURE — 59400 OBSTETRICAL CARE: CPT | Performed by: OBSTETRICS & GYNECOLOGY

## 2018-03-04 PROCEDURE — 0KQM0ZZ REPAIR PERINEUM MUSCLE, OPEN APPROACH: ICD-10-PCS | Performed by: OBSTETRICS & GYNECOLOGY

## 2018-03-04 PROCEDURE — 37000011 ZZH ANESTHESIA WARD SERVICE

## 2018-03-04 RX ORDER — FENTANYL CITRATE 50 UG/ML
25 INJECTION, SOLUTION INTRAMUSCULAR; INTRAVENOUS ONCE
Status: DISCONTINUED | OUTPATIENT
Start: 2018-03-04 | End: 2018-03-06 | Stop reason: CLARIF

## 2018-03-04 RX ORDER — IBUPROFEN 400 MG/1
800 TABLET, FILM COATED ORAL EVERY 6 HOURS PRN
Status: DISCONTINUED | OUTPATIENT
Start: 2018-03-04 | End: 2018-03-06 | Stop reason: HOSPADM

## 2018-03-04 RX ORDER — NALOXONE HYDROCHLORIDE 0.4 MG/ML
.1-.4 INJECTION, SOLUTION INTRAMUSCULAR; INTRAVENOUS; SUBCUTANEOUS
Status: DISCONTINUED | OUTPATIENT
Start: 2018-03-04 | End: 2018-03-06 | Stop reason: HOSPADM

## 2018-03-04 RX ORDER — ROPIVACAINE HYDROCHLORIDE 2 MG/ML
10 INJECTION, SOLUTION EPIDURAL; INFILTRATION; PERINEURAL ONCE
Status: DISCONTINUED | OUTPATIENT
Start: 2018-03-04 | End: 2018-03-06 | Stop reason: CLARIF

## 2018-03-04 RX ORDER — DOCUSATE SODIUM 100 MG/1
100 CAPSULE, LIQUID FILLED ORAL 2 TIMES DAILY
Status: DISCONTINUED | OUTPATIENT
Start: 2018-03-04 | End: 2018-03-06 | Stop reason: HOSPADM

## 2018-03-04 RX ORDER — PHENYLEPHRINE HCL IN 0.9% NACL 1 MG/10 ML
100 SYRINGE (ML) INTRAVENOUS EVERY 5 MIN PRN
Status: DISCONTINUED | OUTPATIENT
Start: 2018-03-04 | End: 2018-03-04

## 2018-03-04 RX ORDER — OXYCODONE HYDROCHLORIDE 5 MG/1
5 TABLET ORAL EVERY 4 HOURS PRN
Status: DISCONTINUED | OUTPATIENT
Start: 2018-03-04 | End: 2018-03-06 | Stop reason: HOSPADM

## 2018-03-04 RX ORDER — OXYTOCIN 10 [USP'U]/ML
10 INJECTION, SOLUTION INTRAMUSCULAR; INTRAVENOUS
Status: DISCONTINUED | OUTPATIENT
Start: 2018-03-04 | End: 2018-03-06 | Stop reason: HOSPADM

## 2018-03-04 RX ORDER — NALOXONE HYDROCHLORIDE 0.4 MG/ML
.1-.4 INJECTION, SOLUTION INTRAMUSCULAR; INTRAVENOUS; SUBCUTANEOUS
Status: DISCONTINUED | OUTPATIENT
Start: 2018-03-04 | End: 2018-03-04

## 2018-03-04 RX ORDER — HYDROCORTISONE 2.5 %
CREAM (GRAM) TOPICAL 3 TIMES DAILY PRN
Status: DISCONTINUED | OUTPATIENT
Start: 2018-03-04 | End: 2018-03-06 | Stop reason: HOSPADM

## 2018-03-04 RX ORDER — MISOPROSTOL 100 UG/1
400 TABLET ORAL
Status: DISCONTINUED | OUTPATIENT
Start: 2018-03-04 | End: 2018-03-06 | Stop reason: HOSPADM

## 2018-03-04 RX ORDER — CITRIC ACID/SODIUM CITRATE 334-500MG
30 SOLUTION, ORAL ORAL ONCE
Status: DISCONTINUED | OUTPATIENT
Start: 2018-03-04 | End: 2018-03-06 | Stop reason: CLARIF

## 2018-03-04 RX ORDER — BISACODYL 10 MG
10 SUPPOSITORY, RECTAL RECTAL DAILY PRN
Status: DISCONTINUED | OUTPATIENT
Start: 2018-03-06 | End: 2018-03-06 | Stop reason: HOSPADM

## 2018-03-04 RX ORDER — NALBUPHINE HYDROCHLORIDE 20 MG/ML
2.5-5 INJECTION, SOLUTION INTRAMUSCULAR; INTRAVENOUS; SUBCUTANEOUS EVERY 6 HOURS PRN
Status: DISCONTINUED | OUTPATIENT
Start: 2018-03-04 | End: 2018-03-06 | Stop reason: HOSPADM

## 2018-03-04 RX ORDER — ONDANSETRON 2 MG/ML
4 INJECTION INTRAMUSCULAR; INTRAVENOUS EVERY 6 HOURS PRN
Status: DISCONTINUED | OUTPATIENT
Start: 2018-03-04 | End: 2018-03-06 | Stop reason: HOSPADM

## 2018-03-04 RX ORDER — PHENYLEPHRINE HCL IN 0.9% NACL 1 MG/10 ML
100 SYRINGE (ML) INTRAVENOUS EVERY 5 MIN PRN
Status: DISCONTINUED | OUTPATIENT
Start: 2018-03-04 | End: 2018-03-06 | Stop reason: CLARIF

## 2018-03-04 RX ORDER — FENTANYL CITRATE 50 UG/ML
50 INJECTION, SOLUTION INTRAMUSCULAR; INTRAVENOUS ONCE
Status: COMPLETED | OUTPATIENT
Start: 2018-03-04 | End: 2018-03-04

## 2018-03-04 RX ORDER — LIDOCAINE HYDROCHLORIDE AND EPINEPHRINE 15; 5 MG/ML; UG/ML
3 INJECTION, SOLUTION EPIDURAL
Status: DISCONTINUED | OUTPATIENT
Start: 2018-03-04 | End: 2018-03-06 | Stop reason: CLARIF

## 2018-03-04 RX ORDER — ACETAMINOPHEN 325 MG/1
650 TABLET ORAL EVERY 4 HOURS PRN
Status: DISCONTINUED | OUTPATIENT
Start: 2018-03-04 | End: 2018-03-06 | Stop reason: HOSPADM

## 2018-03-04 RX ORDER — ONDANSETRON 4 MG/1
4 TABLET, ORALLY DISINTEGRATING ORAL EVERY 6 HOURS PRN
Status: DISCONTINUED | OUTPATIENT
Start: 2018-03-04 | End: 2018-03-06 | Stop reason: HOSPADM

## 2018-03-04 RX ORDER — LANOLIN 100 %
OINTMENT (GRAM) TOPICAL
Status: DISCONTINUED | OUTPATIENT
Start: 2018-03-04 | End: 2018-03-06 | Stop reason: HOSPADM

## 2018-03-04 RX ORDER — MORPHINE SULFATE 1 MG/ML
150 INJECTION, SOLUTION EPIDURAL; INTRATHECAL; INTRAVENOUS ONCE
Status: DISCONTINUED | OUTPATIENT
Start: 2018-03-04 | End: 2018-03-06 | Stop reason: CLARIF

## 2018-03-04 RX ADMIN — Medication 100 ML/HR: at 03:37

## 2018-03-04 RX ADMIN — IBUPROFEN 800 MG: 400 TABLET, FILM COATED ORAL at 05:52

## 2018-03-04 RX ADMIN — DOCUSATE SODIUM 100 MG: 100 CAPSULE, LIQUID FILLED ORAL at 12:40

## 2018-03-04 RX ADMIN — SODIUM CHLORIDE, SODIUM LACTATE, POTASSIUM CHLORIDE, AND CALCIUM CHLORIDE 1000 ML: 600; 310; 30; 20 INJECTION, SOLUTION INTRAVENOUS at 00:30

## 2018-03-04 RX ADMIN — IBUPROFEN 800 MG: 400 TABLET, FILM COATED ORAL at 18:05

## 2018-03-04 RX ADMIN — NALBUPHINE HYDROCHLORIDE 10 MG: 20 INJECTION, SOLUTION INTRAMUSCULAR; INTRAVENOUS; SUBCUTANEOUS at 00:20

## 2018-03-04 RX ADMIN — SODIUM CHLORIDE, SODIUM LACTATE, POTASSIUM CHLORIDE, AND CALCIUM CHLORIDE 500 ML: 600; 310; 30; 20 INJECTION, SOLUTION INTRAVENOUS at 02:11

## 2018-03-04 RX ADMIN — NALBUPHINE HYDROCHLORIDE 10 MG: 20 INJECTION, SOLUTION INTRAMUSCULAR; INTRAVENOUS; SUBCUTANEOUS at 02:42

## 2018-03-04 RX ADMIN — DOCUSATE SODIUM 100 MG: 100 CAPSULE, LIQUID FILLED ORAL at 22:11

## 2018-03-04 RX ADMIN — IBUPROFEN 800 MG: 400 TABLET, FILM COATED ORAL at 12:38

## 2018-03-04 RX ADMIN — FENTANYL CITRATE 50 MCG: 50 INJECTION INTRAMUSCULAR; INTRAVENOUS at 00:31

## 2018-03-04 RX ADMIN — ONDANSETRON 4 MG: 2 INJECTION INTRAMUSCULAR; INTRAVENOUS at 00:12

## 2018-03-04 NOTE — ANESTHESIA PREPROCEDURE EVALUATION
Anesthesia Evaluation       history and physical reviewed . Pt has not had prior anesthetic     No history of anesthetic complications          ROS/MED HX    ENT/Pulmonary:  - neg pulmonary ROS   (+)ERAN risk factors snores loudly, , . .    Neurologic:  - neg neurologic ROS     Cardiovascular:  - neg cardiovascular ROS       METS/Exercise Tolerance:  >4 METS   Hematologic:  - neg hematologic  ROS       Musculoskeletal:   (+) , , other musculoskeletal- scoliosis      GI/Hepatic:     (+) GERD       Renal/Genitourinary:  - ROS Renal section negative       Endo:  - neg endo ROS       Psychiatric:  - neg psychiatric ROS       Infectious Disease:  - neg infectious disease ROS       Malignancy:      - no malignancy   Other:    (+) Possibly pregnant C-spine cleared: N/A, no H/O Chronic Pain,no other significant disability                    Physical Exam  Normal systems: cardiovascular, pulmonary and dental    Airway   Mallampati: III  TM distance: >3 FB  Neck ROM: full    Dental     Cardiovascular   Rhythm and rate: regular and normal      Pulmonary           neg OB ROS                 Anesthesia Plan      History & Physical Review  History and physical reviewed and following examination; no interval change.    ASA Status:  2 .  OB Epidural Asa: 2   NPO Status:  Full stomach    Plan for Epidural and Spinal     Will attempt ITN for immediate pain control, once pain is controlled, will attempt SHU if patient is able to cooperate.      Postoperative Care      Consents  Anesthetic plan, risks, benefits and alternatives discussed with:  Patient..                          .

## 2018-03-04 NOTE — PLAN OF CARE
Problem: Patient Care Overview  Goal: Plan of Care/Patient Progress Review  Outcome: Improving   18 08   OTHER   Plan Of Care Reviewed With patient   Plan of Care Review   Progress improving   Assessments completed as charted. B/P: 109/65, T: 98.7, P: Data Unavailable, R: 18. Rates pain: 2/10 gave PRN Ibuprofen 800 mg, see MAR. Voiding without difficulty. Fundus: Midline firm, U/U. Lochia: Light. Activity: moving with minor difficulty due to perineal pain. Infant feeding: Breast feeding going well.     LATCH Score:   Latch: 2 - Good Latch  Audible Swallowin - Spontaneous & frequent  Type of Nipple: (Breast/Nipple) 1 everted, but short and hard for baby to latch on to, using nipple shield for latch   Comfort: 2 - Soft, Nontender  Hold: 1 - Min. Assist   Total LATCH Score: 8    Postpartum breastfeeding assessment completed and education provided, see Patient Education Activity.  Items included in the education are:     proper positioning and latch    effectiveness of feeding    manual expression    handling and storing breastmilk    maintenance of breastfeeding for the first 6 months    sign/symptoms of infant feeding issues requiring referral to qualified health care provider  Postpartum care education provided, see Patient Education activity. Patient denies needs. Will continue to monitor.  Flavia Elizabeth        Problem: Postpartum (Vaginal Delivery) (Adult,Obstetrics,Pediatric)  Goal: Signs and Symptoms of Listed Potential Problems Will be Absent, Minimized or Managed (Postpartum)  Signs and symptoms of listed potential problems will be absent, minimized or managed by discharge/transition of care (reference Postpartum (Vaginal Delivery) (Adult,Obstetrics,Pediatric) CPG).   Outcome: Improving   18   Postpartum (Vaginal Delivery)   Problems Assessed (Postpartum Vaginal Delivery) all   Problems Present (Postpartum Vag Deliv) none

## 2018-03-04 NOTE — L&D DELIVERY NOTE
OB Vaginal Delivery Note    Tasia Melgar MRN# [id number]   Age: 27 year old YOB: 1990       GA: 40w3d  GP:   Labor Complications: None   EBL: 300  mL  QBL:    Delivery Type: Vaginal, Spontaneous Delivery   ROM to Delivery Time: 3h 59m  Derry Weight: 3.308 kg (7 lb 4.7 oz)    1 Minute 5 Minute 10 Minute   Apgar Totals: 9    9                Delivery Details:  Tasia Melgar, a 27 year old  female delivered a viable female infant with apgars of 9   and 9  . Patient was fully dilated and pushing after    hours    minutes in active labor. Delivery was via vaginal, spontaneous delivery  to a sterile field under local anesthesia to the perineum. Infant delivered in vertex  middle  occiput  anterior  position. Anterior and posterior shoulders delivered without difficulty. There was a nuchal cord x 1 which was reduced over the shoulders during delivery. The baby was placed on the maternal abdomen. About 60 seconds later, the cord was clamped and cut and 3 vessels  were noted. Cord blood was obtained in routine fashion with the following disposition: lab .      Cord complications: nuchal   Placenta delivered at 3/4  3:35 AM . Placental disposition was Pathology . Fundal massage performed and fundus found to be firm.     Episiotomy: none    Perineum, vagina, cervix were inspected, and the following lacerations were noted:   Perineal lacerations: 2nd                     Any lacerations were repaired in the usual fashion using 3-0 vicryl running suture.    Excellent hemostasis was noted. Needle count correct. Infant and patient in delivery room in good and stable condition.         Labor Event Times    Labor onset date:  3/3/18 Onset time:   8:30 PM   Dilation complete date:  3/4/18 Complete time:   3:06 AM   Start pushing date/time:  3/4/2018 0308            Labor Length    3rd Stage (hrs):  0 (min):  7      Labor Events     labor?:  No   Labor Type:  Spontaneous   Predominate  "monitoring during 1st stage:  intermittent auscultation      Antibiotics received during labor?:  No      Rupture identifier:  Rupture 1   Rupture date/time: 3/3/18 2329   Rupture type:  Spontaneous rupture of membranes occuring during spontaneous labor or augmentation   Fluid color:  Meconium   Fluid odor:  Normal      1:1 continuous labor support provided by?:  RN Labor partogram used?:  no         Delivery/Placenta Date and Time    Delivery Date:  3/4/18 Delivery Time:   3:28 AM   Placenta Date/Time:  3/4/2018  3:35 AM   Oxytocin given at the time of delivery:  after delivery of placenta      Vaginal Counts    Initial count performed by 2 team members:   Two Team Members   Flavia Hooks- supervisor           Needles Suture Monument Sponges Instruments   Initial counts 1  31 13   Added to count  1     Final counts 1  31 13      Placed during labor Accounted for at the end of labor   NA NA   NA NA   NA NA      Final count performed by 2 team members:   Two Team Members   Jayshree Sanz          Final count correct?:  Yes         Apgars    Living status:  Living    1 Minute 5 Minute 10 Minute 15 Minute 20 Minute   Skin color: 1  1       Heart rate: 2  2       Reflex irritability: 2  2       Muscle tone: 2  2       Respiratory effort: 2  2       Total: 9  9          Apgars assigned by:  JAYSHREE LANGSTON RN      Cord    Vessels:  3 Vessels Complications:  Nuchal   Cord Blood Disposition:  Lab Gases Sent?:  No          Resuscitation    Methods:  None      Output in Delivery Room:  Voided       Measurements    Weight:  7 lb 4.7 oz Length:  1' 9.25\"   Head circumference:  33 cm Chest circumference:  32.4 cm      Skin to Skin and Feeding Plan    Skin to skin initiation date/time: 3/4/18 032   Skin to skin with:  Mother   Skin to skin end date/time:     How do you plan to feed your baby:  Breastfeeding      Labor Events and Shoulder Dystocia    Fetal Tracing Prior to Delivery: "  Category 2   Fetal Tracing Comments:  repetative early decelerations   Shoulder dystocia present?:  Neg            Delivery (Maternal) (Provider to Complete) (130973)    Episiotomy:  None   Perineal lacerations:  2nd Repaired?:  Yes   Vaginal laceration?:  No    Cervical laceration?:  No    Est. blood loss (mL):  300         Mother's Information  Mother: Tasia Melgar #[id number]    Start of Mother's Information     IO Blood Loss  03/03/18 2030 - 03/04/18 0416    Mom's I/O Activity            End of Mother's Information  Mother: Tasia Melgar #[id number]            Delivery - Provider to Complete ([id number])    Delivering clinician:  SARWAT SANZ   Attempted Delivery Types (Choose all that apply):  Spontaneous Vaginal Delivery   Delivery Type (Choose the 1 that will go to the Birth History):  Vaginal, Spontaneous Delivery                           Placenta    Delayed Cord Clamping:  Done   Date/Time:  3/4/2018  3:35 AM   Removal:  Spontaneous   Comments:  3 vessel cord, velamentous cord insertion   Disposition:  Pathology      Anesthesia    Method:  INTRAVENOUS REGIONAL   Analgesic:   BIRTH HISTORY: ANALGESIC   NALBUPHINE HCL 10 MG/ML IJ SOLN            Presentation and Position    Presentation:  Vertex   Position:  Middle Occiput Anterior                    Sarwat Sanz MD

## 2018-03-04 NOTE — PROGRESS NOTES
After evaluating the patient, she doesn't not believe she will be able to cooperate for an ITN/Epidural placement but would like to try some IV fentanyl to see if the pain can be controlled enough to attempt the procedure.

## 2018-03-04 NOTE — PLAN OF CARE
OB Triage Note  Tasia Melgar  MRN: [id number]  Gestational Age: 40w2d      Tasia Melgar presents for contractions (sign/symptom/concern).      States maia since 2030.  Rates pain at 8//10.  Bleeding: spotting and blood tinged discharge began at occasional over the past few days per pt report.  Denies LOF.      Dr. Sanz notified of arrival and condition.  Oriented patient to surroundings. Call light within reach.     FHT: 150  Uterine Assessment:Contractions: frequency q 4-6 minutes of mild quality.    Plan:  -Initial NST, then fetal/uterine monitoring per MD/patient plan.  -Sterile vaginal exam/sterile speculum exam.

## 2018-03-04 NOTE — PLAN OF CARE
Problem: Breastfeeding (Adult,Obstetrics,Pediatric)  Goal: Signs and Symptoms of Listed Potential Problems Will be Absent, Minimized or Managed (Breastfeeding)  Signs and symptoms of listed potential problems will be absent, minimized or managed by discharge/transition of care (reference Breastfeeding (Adult,Obstetrics,Pediatric) CPG).  Outcome: Improving  Mother has very small nipples and has been having difficulty getting baby to latch. Nipple shields offered and inctruction was given on previous shift. Mom continued to have difficulty getting baby to have a good latch and to nurse for more that a few moments at a time. Observed breast feeding and baby did not have more that just nipple in her mouth. Hands on assisstance and encouragement provided. Observed baby in football hold position with good latch and nursed for 20 minutes. Will continue to encourage mom with breastfeeding and have lactation consult in the AM. Instructed patient that she needs to call for assistance if she is unable to get baby to latch.

## 2018-03-04 NOTE — PROGRESS NOTES
[facility] Labor and Delivery Progress Note    Tasia Melgar MRN# [id number]   Age: 27 year old YOB: 1990           Subjective:   I was called to evaluate the patient due to tachysystole. She declined an epidural earlier because she said she wouldn't be able to sit still to have it placed. She has been getting Nubain for pain. SROM occurred. Her tracing has been category 1 but then she started having contractions every minute or so accompanied by early decelerations and one late. I had them give her a fluid bolus which spaced her contractions back out to about 2 minutes apart. There is good beat to beat variability and baseline is stable around 140's.            Objective:        Cervical Exam: 9 cm, 100%, 0 station     Position: Anterior    Membranes:   SROM.    Fetal Heart Rate:    Monitor: Marissa monitor    Variability: Moderate    Baseline Rate: 125 bpm    Fetal Heart Rate Tracing: As above.          Assessment:   Tasia Melgar is a 27 year old  who is 40w3d here with          Plan:   Anticipate       @sign@

## 2018-03-04 NOTE — PROGRESS NOTES
I have re-evaluated the patient.  She states the pain is a little better at this time after receiving IV fentanyl and nubaine.  However, she states she will not be able to sit long enough for an ITN/Epidural placement and is refusing both at this time.

## 2018-03-04 NOTE — PLAN OF CARE
Problem: Patient Care Overview  Goal: Plan of Care/Patient Progress Review  Outcome: Improving  Vaginal Delivery Note   of viable Female. Nursery RN: Kennedi SILVA RN  present.  Infant with spontaneous cry, to mother's abdomen, dried and stimulated. Alba cares provided.  Mother and baby in stable condition. Baby skin-to-skin with mom. ID bands placed on infant, mom and maternal grandmother, per mothers request.     Problem: Labor (Cervical Ripen, Induct, Augment) (Adult,Obstetrics,Pediatric)  Goal: Signs and Symptoms of Listed Potential Problems Will be Absent, Minimized or Managed (Labor)  Signs and symptoms of listed potential problems will be absent, minimized or managed by discharge/transition of care (reference Labor (Cervical Ripen, Induct, Augment) (Adult,Obstetrics,Pediatric) CPG).   Outcome: Improving   18 0340   Labor (Cervical Ripen, Induct, Augment)   Problems Assessed (Labor) all   Problems Present (Labor) pain

## 2018-03-04 NOTE — H&P
"  2018    Tasia Melgar  [id number]            OB Admit History & Physical      Ms. Melgar  is here because of the spontaneous onset of labor at term.    She has noticed strong regular contractions with some bloody show but no leaking of amniotic fluid.    No LMP recorded. Patient is pregnant.   Her Estimated Date of Delivery: Mar 1, 2018  , making her 40w3d  wks.      Estimated body mass index is 34.21 kg/(m^2) as calculated from the following:    Height as of 17: 1.689 m (5' 6.5\").    Weight as of 18: 97.6 kg (215 lb 3 oz).  Her prenatal course has been not been complicated.    See prenatal for labs.  negative GBBS, Rubella Immune, RH positive.    Estimated fetal weight= 3500 gm       She is a 27 year old   Her OB history:   Obstetric History       T0      L0     SAB0   TAB0   Ectopic0   Multiple0   Live Births0       # Outcome Date GA Lbr Paul/2nd Weight Sex Delivery Anes PTL Lv   1 Current                      History reviewed. No pertinent past medical history.       Past Surgical History:   Procedure Laterality Date     OTHER SURGICAL HISTORY      [id number],NO PAST SURGERIES         No current outpatient prescriptions on file.       Allergies: Review of patient's allergies indicates no known allergies.      REVIEW OF SYSTEMS:  NEUROLOGIC:  Negative  EYES:  Negative  ENT:  Negative  GI:  Negative  BREAST:  Negative  :  Negative  GYN:  Negative  CV:  Negative  PULMONARY:  Negative  MUSCULOSKELETAL:  Negative  PSYCH:  Negative        Social History     Social History     Marital status:      Spouse name: N/A     Number of children: N/A     Years of education: N/A     Occupational History     Not on file.     Social History Main Topics     Smoking status: Never Smoker     Smokeless tobacco: Never Used     Alcohol use No     Drug use: No      Comment: Drug use: No     Sexual activity: Yes     Partners: Male     Birth control/ protection: None     Other Topics Concern     " Not on file     Social History Narrative    Works as a  at [employer].      Family History   Problem Relation Age of Onset     Hypertension Father      Hypertension             Vitals:     With contractions every  2-3 min    Alert Awake in NAD  HEENT grossly normal  Neck: no lymphadenopathy or thryoidomegaly  Lungs clear  Back without spinal or CVAT  Heart RRR without murmur.  ABD gravid, no organomegaly on exam with no masses palpable  Pelvic:  No amniotic fluid noted, some blood noted  Cervix is 3 cm / 80 % effaced  EXT:  no edema or calf tenderness  Neuro:  normal    Assessment:  IUP at 40w3d  In early labor.    Plan:  Will admit for labor and delivery.    [unfilled]      Sarwat Sanz MD  Dept of OB/GYN  March 4, 2018

## 2018-03-05 LAB — HGB BLD-MCNC: 8.9 G/DL (ref 11.7–15.7)

## 2018-03-05 PROCEDURE — 36415 COLL VENOUS BLD VENIPUNCTURE: CPT | Performed by: OBSTETRICS & GYNECOLOGY

## 2018-03-05 PROCEDURE — 25000132 ZZH RX MED GY IP 250 OP 250 PS 637: Performed by: OBSTETRICS & GYNECOLOGY

## 2018-03-05 PROCEDURE — 85018 HEMOGLOBIN: CPT | Performed by: OBSTETRICS & GYNECOLOGY

## 2018-03-05 PROCEDURE — 88307 TISSUE EXAM BY PATHOLOGIST: CPT

## 2018-03-05 PROCEDURE — 12000027 ZZH R&B OB

## 2018-03-05 RX ADMIN — IBUPROFEN 800 MG: 400 TABLET, FILM COATED ORAL at 05:39

## 2018-03-05 RX ADMIN — DOCUSATE SODIUM 100 MG: 100 CAPSULE, LIQUID FILLED ORAL at 23:46

## 2018-03-05 RX ADMIN — IBUPROFEN 800 MG: 400 TABLET, FILM COATED ORAL at 09:59

## 2018-03-05 RX ADMIN — IBUPROFEN 800 MG: 400 TABLET, FILM COATED ORAL at 16:29

## 2018-03-05 RX ADMIN — IBUPROFEN 800 MG: 400 TABLET, FILM COATED ORAL at 23:47

## 2018-03-05 RX ADMIN — DOCUSATE SODIUM 100 MG: 100 CAPSULE, LIQUID FILLED ORAL at 09:13

## 2018-03-05 RX ADMIN — WITCH HAZEL: 500 SOLUTION RECTAL; TOPICAL at 09:13

## 2018-03-05 RX ADMIN — ACETAMINOPHEN 650 MG: 325 TABLET, FILM COATED ORAL at 20:36

## 2018-03-05 RX ADMIN — ACETAMINOPHEN 650 MG: 325 TABLET, FILM COATED ORAL at 11:35

## 2018-03-05 NOTE — PROGRESS NOTES
[facility] Post-Partum Progress Note          Assessment and Plan:    Assessment:   Post-partum day #1  Normal spontaneous vaginal delivery  L&D complications: None      Doing well.      Plan:   Ambulation encouraged           Interval History:   Doing well.  Pain is well-controlled.  No fevers.  No history of foul-smelling vaginal discharge.  Good appetite.  Denies chest pain, shortness of breath, nausea or vomiting.  Vaginal bleeding is similar to a heavy menstrual flow.  Ambulatory.  Breastfeeding well.  Voiding, eating a regular diet, and passing flatus. No BM yet.          Significant Problems:    None          Review of Systems:    The patient denies any chest pain, shortness of breath, excessive pain, fever, chills, purulent drainage from the wound, nausea or vomiting.          Medications:   All medications related to the patient's surgery have been reviewed          Physical Exam:   All vitals stable  Uterine fundus is firm, non-tender and at the level of the umbilicus          Data:   All laboratory data related to this surgery reviewed  No imaging studies have been ordered    Sarwat Sanz MD

## 2018-03-05 NOTE — PLAN OF CARE
Problem: Patient Care Overview  Goal: Plan of Care/Patient Progress Review  Outcome: Improving  Assessments completed as charted. B/P: 110/62, T: 98, P: 77, R: 16. Rates pain: 0/10. Voiding without difficulty. Fundus: Midline firm, U/U. Lochia: Light. Activity: normal activity. Infant feeding: Breast feeding going well, with some assistance from nursing staff.     LATCH Score:   Latch: 1 - Repeated Attempts  Audible Swallowin - Spontaneous & frequent  Type of Nipple: (Breast/Nipple) 2 - Everted  Comfort: 2 - Soft, Nontender  Hold: 2 - No Assist   Total LATCH Score: 9    Postpartum breastfeeding assessment completed and education provided, see Patient Education Activity.  Items included in the education are:     proper positioning and latch    effectiveness of feeding    manual expression    handling and storing breastmilk    maintenance of breastfeeding for the first 6 months    sign/symptoms of infant feeding issues requiring referral to qualified health care provider  Postpartum care education provided, see Patient Education activity. Patient denies needs. Will continue to monitor.  Flavia Elizabeth RN 3/5/2018 12:43 AM          Problem: Postpartum (Vaginal Delivery) (Adult,Obstetrics,Pediatric)  Goal: Signs and Symptoms of Listed Potential Problems Will be Absent, Minimized or Managed (Postpartum)  Signs and symptoms of listed potential problems will be absent, minimized or managed by discharge/transition of care (reference Postpartum (Vaginal Delivery) (Adult,Obstetrics,Pediatric) CPG).   Outcome: Improving   18 004   Postpartum (Vaginal Delivery)   Problems Assessed (Postpartum Vaginal Delivery) all   Problems Present (Postpartum Vag Deliv) none

## 2018-03-05 NOTE — PLAN OF CARE
Problem: Breastfeeding (Adult,Obstetrics,Pediatric)  Intervention: Support Exclusive Breastfeeding Success  Infant screams at the breast, rooting. Mom pumped 5 mls of colostrum. Grandma and mom educated and demostrated on syringe feedings.

## 2018-03-06 VITALS
TEMPERATURE: 97.9 F | SYSTOLIC BLOOD PRESSURE: 120 MMHG | DIASTOLIC BLOOD PRESSURE: 72 MMHG | OXYGEN SATURATION: 98 % | HEART RATE: 90 BPM | RESPIRATION RATE: 16 BRPM

## 2018-03-06 PROCEDURE — 25000132 ZZH RX MED GY IP 250 OP 250 PS 637: Performed by: OBSTETRICS & GYNECOLOGY

## 2018-03-06 PROCEDURE — 72200001 ZZH LABOR CARE VAGINAL DELIVERY SINGLE

## 2018-03-06 RX ORDER — IBUPROFEN 600 MG/1
600 TABLET, FILM COATED ORAL EVERY 6 HOURS PRN
Qty: 30 TABLET | Refills: 0 | Status: SHIPPED | OUTPATIENT
Start: 2018-03-06 | End: 2019-09-09

## 2018-03-06 RX ORDER — FERROUS SULFATE 325(65) MG
325 TABLET ORAL
Qty: 90 TABLET | Refills: 1 | Status: SHIPPED | OUTPATIENT
Start: 2018-03-06 | End: 2019-09-09

## 2018-03-06 RX ORDER — ASPIRIN 81 MG
100 TABLET, DELAYED RELEASE (ENTERIC COATED) ORAL DAILY
Qty: 100 TABLET | Refills: 1 | Status: SHIPPED | OUTPATIENT
Start: 2018-03-06 | End: 2019-09-09

## 2018-03-06 RX ADMIN — IBUPROFEN 800 MG: 400 TABLET, FILM COATED ORAL at 06:12

## 2018-03-06 NOTE — PROGRESS NOTES
Assessments completed as charted. B/P: 141/89, T: 98.1, P: 82, R: 18. Rates pain: 1/10 1. Voiding without difficulty. Fundus: Midline u/1. Lochia: Light. Activity: unrestricted with out pain  and normal activity. Infant feeding: Breast feeding going fair with nipple shield..     LATCH Score:   Latch: 1 - Repeated Attempts  Audible Swallowin - Few  Type of Nipple: (Breast/Nipple) 2 - Everted  Comfort: 2 - Soft, Nontender  Hold: 1 - Min. Assist   Total LATCH Score:     Postpartum breastfeeding assessment completed and education provided, see Patient Education Activity.  Items included in the education are:     proper positioning and latch    effectiveness of feeding    manual expression    handling and storing breastmilk    maintenance of breastfeeding for the first 6 months    sign/symptoms of infant feeding issues requiring referral to qualified health care provider  Postpartum care education provided, see Patient Education activity. Patient denies needs. Will monitor.  Abeba Swartz

## 2018-03-06 NOTE — DISCHARGE SUMMARY
VAGINAL DELIVERY DISCHARGE SUMMARY    Admit date: 3/3/2018  Discharge date: 3/6/2018     Admit Dx:   - 27 year old  at 40w3d   - spontaneous labor    Discharge Dx:  - Same as above, s/p     Procedures:  - Spontaneous vaginal delivery    Admit HPI:  Ms. Tasia Melgar is a   at 40w3d who was admitted for spontaneous labor on 3/3/2018. Please see her admit H&P for full details of her PMH, PSH, Meds, Allergies and exam on admit.    Hospital course:  Tasia Melgar was admitted to the hospital on 3/3/2018 for the above listed indications. Her labor progressed sopntaneously. She had SROM at 3/3/18 on 2329. She progressed to complete dilation at 0306. On 3/4/18 at 0328, she delivered a female infant. APGARS were 9/9. Weight was 3306g. EBL from the delivery was 300. Please see her Delivery Summary for full details regarding her delivery.    Her postpartum course was complicated by nothing. On PPD#2, she was meeting all of her postpartum goals and deemed stable for discharge. She was voiding without difficulty, tolerating a regular diet without nausea and vomiting, her pain was well controlled on oral pain medicines and her lochia was appropriate. Her hemoglobin prior to delivery was 11.0 and after delivery was 8.9 which was asymptomatic and she will be discharged on iron supplementation. Her Rh status was positive, and Rhogam was not indicated.     Discharge Medications:  Current Discharge Medication List      START taking these medications    Details   ibuprofen (ADVIL/MOTRIN) 600 MG tablet Take 1 tablet (600 mg) by mouth every 6 hours as needed for moderate pain  Qty: 30 tablet, Refills: 0    Associated Diagnoses: Term pregnancy delivered      ferrous sulfate (IRON) 325 (65 FE) MG tablet Take 1 tablet (325 mg) by mouth daily (with breakfast)  Qty: 90 tablet, Refills: 1    Associated Diagnoses: Anemia due to blood loss, acute      docusate sodium (COLACE) 100 MG tablet Take 100 mg by mouth daily  Qty:  100 tablet, Refills: 1    Associated Diagnoses: Term pregnancy delivered         CONTINUE these medications which have NOT CHANGED    Details   acetaminophen (TYLENOL) 325 MG tablet Take by mouth every 4 hours as needed . Max acetaminophen dose: 4,000 mg per 24 hours.      Misc. Devices (BREAST PUMP) MISC For home use. Anticipated Gestation age at delivery: 40 weeks. Reason for need: breastfeeding. Length of need: 1 year      ondansetron (ZOFRAN-ODT) 4 MG ODT tab Place 4 mg under the tongue every 8 hours as needed for nausea or vomiting             Discharge/Disposition:  Tasia Melgar was discharged to home in stable condition with the following instructions/medications:  1) Call for temperature > 100.4, bright red vaginal bleeding >1 pad an hour x 2 hours, foul smelling vaginal discharge, pain not controlled by usual oral pain meds, persistent nausea and vomiting not controlled on medications  2) She desired an IUD for contraception.  3) For feeding she decided to breast.  4) She was instructed to follow-up with Dr Randle in 6 weeks for a routine postpartum visit and IUD insertion.      Swati Randle MD  OBGYN  3/6/2018 7:41 AM

## 2018-03-06 NOTE — PROGRESS NOTES
OB/GYN Postpartum Note      S:  Patient is feeling well this mornign.  Lochia = menses.  Eating and drinking without nausea.  Ambulating without difficulty.  Pain is well controlled. Some burning with urination, improves with water bottle.  Breastfeeding is getting better.      O:   Vitals:    18 0000 18 0800 18 1500 18 0015   BP: 110/62 116/66 141/89 126/78   Pulse:   82 86   Resp:    Temp: 98  F (36.7  C) 98.1  F (36.7  C) 98.1  F (36.7  C) 98.3  F (36.8  C)   TempSrc: Oral Oral Temporal Oral   SpO2: 97% 98%       General: resting in bed, in NAD  Resp: nonlabored  Abdomen: soft, nontender, nondistended  Fundus firm at umbilicus-1  Extremities: no edema in BLE    Hemoglobin   Date Value Ref Range Status   2018 8.9 (L) 11.7 - 15.7 g/dL Final   ]    A: Ms. Tasia Melgar is a 27 year old  PPD #2 s/p     P:  Heme 11.0 >  > 8.9, asymptomatic  GI: tolerating regular diet  Feeding: breast  Contraception: IUD at PP visit  Rubella Immune  Rh positive  Disposition: d/c later today    Swati Randle MD  OB/GYN  3/6/2018 7:36 AM

## 2018-03-06 NOTE — PROGRESS NOTES
Assessments completed as charted. B/P: 126/78, T: 98.3, P: 86, R: 18. Rates pain: 4/10 - 5/10. Pain managed with tylenol and ibuprofen  Voiding without difficulty. Fundus: Midline  U/1 and firm. Lochia: Light. Activity: unrestricted with out pain  and normal activity. Infant feeding: Breast feeding going fair to well. Mother struggling off and on to get baby latched to nipple shield.    LATCH Score:   Latch: 1 - Repeated Attempts  Audible Swallowin - Spontaneous & frequent  Type of Nipple: (Breast/Nipple) 2 - Everted  Comfort: 2 - Soft, Nontender  Hold: 1 - Min. Assist   Total LATCH Score: 8    Postpartum breastfeeding assessment completed and education provided, see Patient Education Activity.  Items included in the education are:     proper positioning and latch    effectiveness of feeding    manual expression    handling and storing breastmilk    maintenance of breastfeeding for the first 6 months    sign/symptoms of infant feeding issues requiring referral to qualified health care provider  Postpartum care education provided, see Patient Education activity. Patient denies needs. Will monitor.  Sury Vasquez

## 2018-03-06 NOTE — DISCHARGE INSTRUCTIONS
There is a Lactation appointment scheduled for you and baby Wednesday March 7 at 9:00. Please report to the Diagnostics window 15 minutes prior to you scheduled appointment time. There is an post partum appointment scheduled with Dr. Randle for Tuesday April 17 at 11:15.

## 2018-03-07 ENCOUNTER — LACTATION ENCOUNTER (OUTPATIENT)
Age: 28
End: 2018-03-07

## 2018-03-07 ENCOUNTER — OFFICE VISIT (OUTPATIENT)
Dept: OBGYN | Facility: OTHER | Age: 28
End: 2018-03-07
Payer: COMMERCIAL

## 2018-03-07 PROCEDURE — S9443 LACTATION CLASS: HCPCS | Performed by: REGISTERED NURSE

## 2018-03-07 NOTE — PROGRESS NOTES
Outpatient Lactation Visit    Alanna Melgar  [id number]    Consultation Date: 2018     Reason for Lactation Referral: Initial Lactation Consult    Baby's : 3/4/2018    Baby's Current Age: 3 day old  Baby's Gestational Age: Gestational Age: 40w3d    Primary Care Provider: No Ref-Primary, Physician    Presenting Problem (concerns as stated by parent): none    MATERNAL HISTORY   History of Breast Surgery: none  Breast Changes During Pregnancy: no  Breast Feeding History: primigravida  Maternal Meds: daily prenatal vitamin  Pregnancy Complications: none  Anesthesia during labor: none    MATERNAL ASSESSMENT    Breast Size: average, symmetrical, soft after feeding and filling prior to feeding  Nipple Appearance - Left: slightly cracked, with signs of healing, education on further healing techniques provided  Nipple Appearance - Right: slightly cracked, with signs of healing, education on further healing techniques provided  Nipple Erectility - Left: erect with stimulation  Nipple Erectility - Right: erect with stimulation  Areolas Compressibility: soft  Nipple Size: average  Special Equipment Used: none  Day mother reports milk came in:  Day 3    INFANT ASSESSMENT    Oral Anatomy  Mouth: normal  Palate: normal  Jaw: normal  Tongue: normal  Frenulum: normal   Digital Suck Exam: root    FEEDING   Feeding Time: aggressively for 30 minutes  Position:  cradle  Effort to Latch: awake and alert, latched easily  Duration of Breast Feeding: Right Breast: 15; Left Breast: 15  Results: excellent breast feed    Volume of Intake:    Birth Weight: 7 lb 4.7 oz    Hospital discharge weight: 6 lb 13.7 oz    Today's Weight 6 lb 11.6 oz    Total Intake: 0.75 oz  Output: 2 soil diapers in last 24 hours, 3-4 wet diapers in last 24 hours    LATCH Score:   Latch: 2 - Good Latch  Audible Swallowin - Spontaneous & frequent  Type of Nipple: (Breast/Nipple) 2 - Everted  Comfort: 2 - Soft, Nontender  Hold: 2 - No Assist   Total  LATCH Score:  10    FEEDING PLAN    Home Feeding Plan: Continue to feed on demand when  elicits feeding cues with deep latch.  Babe should be eating 8-12 times in a 24 hour period.  Exclusivity explained and encouraged in the early weeks to establish breastfeeding and order in milk supply.  Rooming-in encouraged with explanation of the benefits.  Continue to apply expressed breast milk and Lanolin cream to nipples after feedings for healing and comfort.  Postpartum breastfeeding assessment completed and education provided.  Items included in the education are:     proper positioning and latch    effectiveness of feeding    manual expression    handling and storing breastmilk    maintenance of breastfeeding for the first 6 months    sign/symptoms of infant feeding issues requiring referral to qualified health care provider    LACTATION COMMENTS   Deep latch explained for proper positioning of breast in infant's mouth, maximizing milk transfer and comfort.  Reassurance and encouragement provided in regard to mom's concerns about milk supply.  Follow-up support information provided.  Parents plan to keep [place] Well-Child Check with Dr. Gonzalez as scheduled for 2 week well child check.      Face-to-face Time: 60 minutes with assessment and education.    Morenita Ruff  3/7/2018  9:29 AM

## 2018-03-07 NOTE — MR AVS SNAPSHOT
After Visit Summary   3/7/2018    Tasia Melgar    MRN: [id number]           Patient Information     Date Of Birth          1990        Visit Information        Provider Department      3/7/2018 9:00 AM  LACTATION NURSE [facility]        Today's Diagnoses     Postpartum care and examination of lactating mother    -  1      Care Instructions    See lactation note          Follow-ups after your visit        Follow-up notes from your care team     Return with any questions or concerns.      Your next 10 appointments already scheduled     Apr 17, 2018 11:15 AM CDT   Post Partum with Swati Randle MD   [facility] ([facility]    [address]  [place] [address] [phone]              Who to contact     If you have questions or need follow up information about today's clinic visit or your schedule please contact [facility] directly at [phone].  Normal or non-critical lab and imaging results will be communicated to you by [employer], letter or phone within 4 business days after the clinic has received the results. If you do not hear from us within 7 days, please contact the clinic through [employer] or phone. If you have a critical or abnormal lab result, we will notify you by phone as soon as possible.  Submit refill requests through [employer] or call your pharmacy and they will forward the refill request to us. Please allow 3 business days for your refill to be completed.          Additional Information About Your Visit        MyChart Information     [employer] gives you secure access to your electronic health record. If you see a primary care provider, you can also send messages to your care team and make appointments. If you have questions, please call your primary care clinic.  If you do not have a primary care provider, please call [phone] and they will assist you.        Care  EveryWhere ID     This is your Care EveryWhere ID. This could be used by other organizations to access your [facility] medical records  [id number]         Blood Pressure from Last 3 Encounters:   03/06/18 120/72   02/27/18 128/78   02/20/18 122/78    Weight from Last 3 Encounters:   02/27/18 97.6 kg (215 lb 3 oz)   02/20/18 96.6 kg (213 lb)   02/15/18 95.3 kg (210 lb)              Today, you had the following     No orders found for display       Primary Care Provider Office Phone # Fax #    Ana Sanches [phone]       [address]  [facility] [address]        Equal Access to Services     MANSI PERDOMO : Hadii grecia schmitzo Jaun, waaxda luqadaha, qaybta kaalmada adeegyada, jo elaine . So [facility] [phone].    ATENCIÓN: Si habla español, tiene a caban disposición servicios gratuitos de asistencia lingüística. Ll[facility] [phone].    We comply with applicable federal civil rights laws and [place] laws. We do not discriminate on the basis of race, color, national origin, age, disability, sex, sexual orientation, or gender identity.            Thank you!     Thank you for choosing [facility] AND [facility]  for your care. Our goal is always to provide you with excellent care. Hearing back from our patients is one way we can continue to improve our services. Please take a few minutes to complete the written survey that you may receive in the mail after your visit with us. Thank you!             Your Updated Medication List - Protect others around you: Learn how to safely use, store and throw away your medicines at www.disposemymeds.org.          This list is accurate as of 3/7/18  9:33 AM.  Always use your most recent med list.                   Brand Name Dispense Instructions for use Diagnosis    acetaminophen 325 MG tablet    TYLENOL     Take by mouth every 4 hours as needed . Max acetaminophen dose: 4,000 mg per 24 hours.        breast pump  Misc      For home use. Anticipated Gestation age at delivery: 40 weeks. Reason for need: breastfeeding. Length of need: 1 year        docusate sodium 100 MG tablet    COLACE    100 tablet    Take 100 mg by mouth daily    Term pregnancy delivered       ferrous sulfate 325 (65 FE) MG tablet    IRON    90 tablet    Take 1 tablet (325 mg) by mouth daily (with breakfast)    Anemia due to blood loss, acute       ibuprofen 600 MG tablet    ADVIL/MOTRIN    30 tablet    Take 1 tablet (600 mg) by mouth every 6 hours as needed for moderate pain    Term pregnancy delivered       ondansetron 4 MG ODT tab    ZOFRAN-ODT     Place 4 mg under the tongue every 8 hours as needed for nausea or vomiting

## 2018-03-07 NOTE — LACTATION NOTE
This note was copied from a baby's chart.  Outpatient Lactation Visit    Alanna Melgar  [id number]    Consultation Date: 2018     Reason for Lactation Referral: Initial Lactation Consult    Baby's : 3/4/2018    Baby's Current Age: 3 day old  Baby's Gestational Age: Gestational Age: 40w3d    Primary Care Provider: No Ref-Primary, Physician    Presenting Problem (concerns as stated by parent): none    MATERNAL HISTORY   History of Breast Surgery: none  Breast Changes During Pregnancy: no  Breast Feeding History: primigravida  Maternal Meds: daily prenatal vitamin  Pregnancy Complications: none  Anesthesia during labor: none    MATERNAL ASSESSMENT    Breast Size: average, symmetrical, soft after feeding and filling prior to feeding  Nipple Appearance - Left: slightly cracked, with signs of healing, education on further healing techniques provided  Nipple Appearance - Right: slightly cracked, with signs of healing, education on further healing techniques provided  Nipple Erectility - Left: erect with stimulation  Nipple Erectility - Right: erect with stimulation  Areolas Compressibility: soft  Nipple Size: average  Special Equipment Used: none  Day mother reports milk came in:  Day 3    INFANT ASSESSMENT    Oral Anatomy  Mouth: normal  Palate: normal  Jaw: normal  Tongue: normal  Frenulum: normal   Digital Suck Exam: root    FEEDING   Feeding Time: aggressively for 30 minutes  Position:  cradle  Effort to Latch: awake and alert, latched easily  Duration of Breast Feeding: Right Breast: 15; Left Breast: 15  Results: excellent breast feed    Volume of Intake:    Birth Weight: 7 lb 4.7 oz    Hospital discharge weight: 6 lb 13.7 oz    Today's Weight 6 lb 11.6 oz    Total Intake: 0.75 oz  Output: 2 soil diapers in last 24 hours, 3-4 wet diapers in last 24 hours    LATCH Score:   Latch: 2 - Good Latch  Audible Swallowin - Spontaneous & frequent  Type of Nipple: (Breast/Nipple) 2 - Everted  Comfort: 2 - Soft,  Nontender  Hold: 2 - No Assist   Total LATCH Score:  10    FEEDING PLAN    Home Feeding Plan: Continue to feed on demand when  elicits feeding cues with deep latch.  Babe should be eating 8-12 times in a 24 hour period.  Exclusivity explained and encouraged in the early weeks to establish breastfeeding and order in milk supply.  Rooming-in encouraged with explanation of the benefits.  Continue to apply expressed breast milk and Lanolin cream to nipples after feedings for healing and comfort.  Postpartum breastfeeding assessment completed and education provided.  Items included in the education are:     proper positioning and latch    effectiveness of feeding    manual expression    handling and storing breastmilk    maintenance of breastfeeding for the first 6 months    sign/symptoms of infant feeding issues requiring referral to qualified health care provider    LACTATION COMMENTS   Deep latch explained for proper positioning of breast in infant's mouth, maximizing milk transfer and comfort.  Reassurance and encouragement provided in regard to mom's concerns about milk supply.  Follow-up support information provided.  Parents plan to keep [place] Well-Child Check with Dr. Gonzalez as scheduled for 2 week well child check.      Face-to-face Time: 60 minutes with assessment and education.    Morenita Ruff  3/7/2018  9:29 AM

## 2018-03-07 NOTE — LACTATION NOTE
Outpatient Lactation Visit    Alanna Melgar  [id number]    Consultation Date: 2018     Reason for Lactation Referral: Initial Lactation Consult    Baby's : 3/4/2018    Baby's Current Age: 3 day old  Baby's Gestational Age: Gestational Age: 40w3d    Primary Care Provider: No Ref-Primary, Physician    Presenting Problem (concerns as stated by parent): none    MATERNAL HISTORY   History of Breast Surgery: none  Breast Changes During Pregnancy: no  Breast Feeding History: primigravida  Maternal Meds: daily prenatal vitamin  Pregnancy Complications: none  Anesthesia during labor: none    MATERNAL ASSESSMENT    Breast Size: average, symmetrical, soft after feeding and filling prior to feeding  Nipple Appearance - Left: slightly cracked, with signs of healing, education on further healing techniques provided  Nipple Appearance - Right: slightly cracked, with signs of healing, education on further healing techniques provided  Nipple Erectility - Left: erect with stimulation  Nipple Erectility - Right: erect with stimulation  Areolas Compressibility: soft  Nipple Size: average  Special Equipment Used: none  Day mother reports milk came in:  Day 3    INFANT ASSESSMENT    Oral Anatomy  Mouth: normal  Palate: normal  Jaw: normal  Tongue: normal  Frenulum: normal   Digital Suck Exam: root    FEEDING   Feeding Time: aggressively for 30 minutes  Position:  cradle  Effort to Latch: awake and alert, latched easily  Duration of Breast Feeding: Right Breast: 15; Left Breast: 15  Results: excellent breast feed    Volume of Intake:    Birth Weight: 7 lb 4.7 oz    Hospital discharge weight: 6 lb 13.7 oz    Today's Weight 6 lb 11.6 oz    Total Intake: 0.75 oz  Output: 2 soil diapers in last 24 hours, 3-4 wet diapers in last 24 hours    LATCH Score:   Latch: 2 - Good Latch  Audible Swallowin - Spontaneous & frequent  Type of Nipple: (Breast/Nipple) 2 - Everted  Comfort: 2 - Soft, Nontender  Hold: 2 - No Assist   Total  LATCH Score:  10    FEEDING PLAN    Home Feeding Plan: Continue to feed on demand when  elicits feeding cues with deep latch.  Babe should be eating 8-12 times in a 24 hour period.  Exclusivity explained and encouraged in the early weeks to establish breastfeeding and order in milk supply.  Rooming-in encouraged with explanation of the benefits.  Continue to apply expressed breast milk and Lanolin cream to nipples after feedings for healing and comfort.  Postpartum breastfeeding assessment completed and education provided.  Items included in the education are:     proper positioning and latch    effectiveness of feeding    manual expression    handling and storing breastmilk    maintenance of breastfeeding for the first 6 months    sign/symptoms of infant feeding issues requiring referral to qualified health care provider    LACTATION COMMENTS   Deep latch explained for proper positioning of breast in infant's mouth, maximizing milk transfer and comfort.  Reassurance and encouragement provided in regard to mom's concerns about milk supply.  Follow-up support information provided.  Parents plan to keep [place] Well-Child Check with Dr. Gonzalez as scheduled for 2 week well child check.      Face-to-face Time: 60 minutes with assessment and education.    Morenita Ruff  3/7/2018  9:29 AM

## 2018-06-11 ENCOUNTER — TELEPHONE (OUTPATIENT)
Dept: OBGYN | Facility: OTHER | Age: 28
End: 2018-06-11

## 2018-06-11 NOTE — TELEPHONE ENCOUNTER
Patient has cancelled or missed several appointments for colposcopy. Nursing has talked to her several times to arrange appointments for this procedure. This nurse attempted to contact patient today to impress upon her the importance of this procedure and toLo get her scheduled. Patient's voicemail was full. At this time, a letter will be composed and sent via certified mail to ensure receipt.    Susan Marroquin RN...................6/11/2018 10:42 AM

## 2018-06-11 NOTE — LETTER
June 11, 2018      Tasia CHAVA Ramón  [address] [address]    Dear MsReynaRamón,      At [facility], your health and wellness is our primary concern. 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 Your provider had recommended that you have a Colposcopy completed by Dr. Swati Randle or Dr. Phani Good. Our records do not show that this has been done.      It is important to complete the follow up that your provider has suggested for you to ensure that there are no worsening changes which may, over time, develop into cancer.      If you have chosen not to do the recommended colposcopy, please contact our office at [phone] to schedule an appointment for a repeat PAP smear and HPV test at your earliest convenience.    If you have completed the tests outside of [facility], please have the results forwarded to our office. We will update the chart for your primary physician to review before your next annual physical.     Thank you for choosing [facility]!    Sincerely,    Ally LEE, RN   Registered Nurse for [facility] OB/[facility]yn providers

## 2018-07-24 NOTE — PROGRESS NOTES
Patient Information     Patient Name  Tasia Melgar MRN  [id number] Sex  Female   1990      Letter by Joyce Bob NP at      Author:  Joyce Bob NP Service:  (none) Author Type:  (none)    Filed:   Encounter Date:  5/10/2017 Status:  (Other)           Tasia Duarte  [address] [facility] [address]          May 10, 2017    Dear MsReyna Duarte:    Tasia Duarte was seen today in the Clinic and missed work due to illness. She missed work also on  due to illness.  Patient may return to work on 17.    Thank you,    Joyce Bob MSN, FNP  [facility]

## 2018-08-10 NOTE — PROGRESS NOTES
Patient discharged at 1345 PM via ambulation accompanied by mother and staff. Prescriptions sent to patients preferred pharmacy. All belongings sent with patient.     Discharge instructions reviewed with patient and family. Listed belongings gathered and returned to patient.     Patient discharged to home.     Core Measures and Vaccines    Pneumonia and Influenza given prior to discharge, if indicated: N/A patient declined.     Surgical Patient   Surgical Procedures during stay: None  Did patient receive discharge instruction on wound care and recognition of infection symptoms? N/A    MISC  Follow up appointment made:  Yes  Home and hospital aquired medications returned to patient: N/A  Patient reports pain was well managed at discharge: Yes     Problem: DISCHARGE PLANNING - CARE MANAGEMENT  Goal: Discharge to post-acute care or home with appropriate resources  INTERVENTIONS:  - Conduct assessment to determine patient/family and health care team treatment goals, and need for post-acute services based on payer coverage, community resources, and patient preferences, and barriers to discharge  - Address psychosocial, clinical, and financial barriers to discharge as identified in assessment in conjunction with the patient/family and health care team  - Arrange appropriate level of post-acute services according to patients   needs and preference and payer coverage in collaboration with the physician and health care team  - Communicate with and update the patient/family, physician, and health care team regarding progress on the discharge plan  - Arrange appropriate transportation to post-acute venues  Outcome: Progressing  LOS 0  Patient is not a bundle or a readmission  CM spoke with patient at bedside  Patient lives in [place] in an apartment with her   There are 16 ZOIE home  Patient does not use any assistive devices  Patient is independent with ADLs  Patient denies history of alcohol/drug abuse and mental illness history  Patient uses [facility] in [place]  Patient has prescription insurance and PACE  Patient states she can afford her medications  Patient does not have an advance directive/living will  Declines information at this time  Patient's emergency contact is her  Miranda Issajohn Sheridan is at bedside with patient  Patient is retired and currently drives  Patient's  can transport patient when medically stable for discharge  CM discussed with patient and  re:DCP  Patient is scheduled for the OR today  PT/OT will work with patient  Patient stated that she would like [facility] if she needs to go to inpatient rehab  CM contact information given to patient at bedside  CM will continue to f/u  CM name and role reviewed  Discharge Checklist reviewed and CM will continue to monitor for progress toward discharge goals in nursing and provider rounds  CM reviewed discharge planning process including the following: identifying caregivers at home, preference for d/c planning needs, [facility] Meds to Bed program, availability of treatment team to discuss questions or concerns patient and/or family may have regarding diagnosis, plan of care, old or new medications and discharge planning   CM will continue to follow for care coordination and update assessment as necessary

## 2019-09-09 ENCOUNTER — OFFICE VISIT (OUTPATIENT)
Dept: FAMILY MEDICINE | Facility: OTHER | Age: 29
End: 2019-09-09
Attending: NURSE PRACTITIONER

## 2019-09-09 VITALS
BODY MASS INDEX: 33.44 KG/M2 | RESPIRATION RATE: 20 BRPM | HEART RATE: 104 BPM | WEIGHT: 208.1 LBS | TEMPERATURE: 97.9 F | OXYGEN SATURATION: 98 % | DIASTOLIC BLOOD PRESSURE: 62 MMHG | HEIGHT: 66 IN | SYSTOLIC BLOOD PRESSURE: 110 MMHG

## 2019-09-09 DIAGNOSIS — H10.31 ACUTE CONJUNCTIVITIS OF RIGHT EYE, UNSPECIFIED ACUTE CONJUNCTIVITIS TYPE: Primary | ICD-10-CM

## 2019-09-09 PROCEDURE — 99213 OFFICE O/P EST LOW 20 MIN: CPT | Performed by: FAMILY MEDICINE

## 2019-09-09 RX ORDER — POLYMYXIN B SULFATE AND TRIMETHOPRIM 1; 10000 MG/ML; [USP'U]/ML
1-2 SOLUTION OPHTHALMIC EVERY 6 HOURS
Qty: 10 ML | Refills: 0 | Status: SHIPPED | OUTPATIENT
Start: 2019-09-09 | End: 2020-05-12

## 2019-09-09 ASSESSMENT — MIFFLIN-ST. JEOR: SCORE: 1700.69

## 2019-09-09 ASSESSMENT — PAIN SCALES - GENERAL: PAINLEVEL: SEVERE PAIN (6)

## 2019-09-09 NOTE — PATIENT INSTRUCTIONS
Patient Education     What Is Conjunctivitis?    Conjunctivitis is an irritation or infection. It affects the membrane that covers the white of your eye and the inside of your eyelid (conjunctiva). It can happen to one or both eyes. The membrane swells and the blood vessels enlarge (dilate). This makes your eye red. That's why conjunctivitis is sometimes called red eye or pink eye.  What are the symptoms?  If you have one or more of these symptoms, see an eye healthcare provider:    Redness in and around your eye    Eyes that are puffy and sore    Itching, burning, or stinging eyes    Watery eyes or discharge from your eye    Eyelids that are crusty or stuck together when you wake up in the morning    Pink color in the whites of one or both eyes    Sensitivity to bright light  Getting treatment quickly can help prevent damage to your eyes.  How is it diagnosed?  Conjunctivitis is usually a minor eye infection. But it can sometimes become a more serious problem. Some more serious eye diseases have symptoms that look like conjunctivitis. So it's important for an eye healthcare provider to diagnose you. Your eye healthcare provider will ask about your symptoms and any medicines you take. He or she will ask about any illnesses or medical conditions you may have. The healthcare provider will also check your eyes with a hand-held light and a special microscope called a slit lamp.  Date Last Reviewed: 10/1/2017    6982-2784 [employer]. [address] [place] [address], [place] [address]. All rights reserved. This information is not intended as a substitute for professional medical care. Always follow your healthcare professional's instructions.

## 2019-09-09 NOTE — PROGRESS NOTES
"Nursing Notes:   Sterling Vasquez LPN  9/9/2019 10:54 AM  Signed  Chief Complaint   Patient presents with     Eye Problem     Pt woke up this morning with swelling in her right eye. She states that it is painful.    Initial /62   Pulse 104   Temp 97.9  F (36.6  C) (Tympanic)   Resp 20   Ht 1.676 m (5' 6\")   Wt 94.4 kg (208 lb 1.6 oz)   SpO2 98%   Breastfeeding? No   BMI 33.59 kg/m    Estimated body mass index is 33.59 kg/m  as calculated from the following:    Height as of this encounter: 1.676 m (5' 6\").    Weight as of this encounter: 94.4 kg (208 lb 1.6 oz).    Medication Reconciliation: complete      Sterling Vasquez LPN    SUBJECTIVE:   28 year old female with burning, redness and mild swelling of right eye. Irritated and burns slightly. Was fine yesterday. NO contacts in last week. 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     OBJECTIVE:   Vital signs:  Temp: 97.9  F (36.6  C) Temp src: Tympanic BP: 110/62 Pulse: 104   Resp: 20 SpO2: 98 %     Height: 167.6 cm (5' 6\") Weight: 94.4 kg (208 lb 1.6 oz)  Estimated body mass index is 33.59 kg/m  as calculated from the following:    Height as of this encounter: 1.676 m (5' 6\").    Weight as of this encounter: 94.4 kg (208 lb 1.6 oz).      Patient appears well.  Eyes: right eye with findings of typical conjunctivitis noted; erythema and discharge. PERRLA, no foreign body noted. No periorbital cellulitis. Left normal    ASSESSMENT:   1. Acute conjunctivitis of right eye, unspecified acute conjunctivitis type          PLAN:   Antibiotic drops per order-has no insurance. Discussed that most viral but her job would want treated to return.   Hygiene discussed.   Follow up if not improving as expected.  Aminta Delatorre MD  11:08 AM 9/9/2019       "

## 2019-09-09 NOTE — NURSING NOTE
"Chief Complaint   Patient presents with     Eye Problem     Pt woke up this morning with swelling in her right eye. She states that it is painful.    Initial /62   Pulse 104   Temp 97.9  F (36.6  C) (Tympanic)   Resp 20   Ht 1.676 m (5' 6\")   Wt 94.4 kg (208 lb 1.6 oz)   SpO2 98%   Breastfeeding? No   BMI 33.59 kg/m   Estimated body mass index is 33.59 kg/m  as calculated from the following:    Height as of this encounter: 1.676 m (5' 6\").    Weight as of this encounter: 94.4 kg (208 lb 1.6 oz).    Medication Reconciliation: complete      Sterling Vasquez LPN  "

## 2020-03-11 ENCOUNTER — HEALTH MAINTENANCE LETTER (OUTPATIENT)
Age: 30
End: 2020-03-11

## 2020-05-12 ENCOUNTER — VIRTUAL VISIT (OUTPATIENT)
Dept: OBGYN | Facility: OTHER | Age: 30
End: 2020-05-12
Attending: OBSTETRICS & GYNECOLOGY
Payer: MEDICAID

## 2020-05-12 VITALS — HEIGHT: 66 IN | BODY MASS INDEX: 30.53 KG/M2 | WEIGHT: 190 LBS

## 2020-05-12 DIAGNOSIS — O36.80X0 ENCOUNTER TO DETERMINE FETAL VIABILITY OF PREGNANCY, SINGLE OR UNSPECIFIED FETUS: Primary | ICD-10-CM

## 2020-05-12 PROCEDURE — 99207 ZZC OB VISIT-NO CHARGE - GICH ONLY: CPT | Mod: TEL

## 2020-05-12 PROCEDURE — G0463 HOSPITAL OUTPT CLINIC VISIT: HCPCS | Mod: TEL

## 2020-05-12 RX ORDER — PRENATAL VIT/IRON FUM/FOLIC AC 27MG-0.8MG
1 TABLET ORAL DAILY
COMMUNITY
End: 2021-01-29

## 2020-05-12 ASSESSMENT — PATIENT HEALTH QUESTIONNAIRE - PHQ9: SUM OF ALL RESPONSES TO PHQ QUESTIONS 1-9: 6

## 2020-05-12 ASSESSMENT — MIFFLIN-ST. JEOR: SCORE: 1613.58

## 2020-05-12 NOTE — PROGRESS NOTES
HPI:    This is a 29 year old female patient,  who was called today for OB Intake visit. Patient reports positive pregnancy test at home about 1 month ago.     Obstetrical history and OB Demographics updated to the best of this nurse's ability based on patient report. PHQ-9 depression screening and routine Domestic Abuse screening completed. All immediate questions and concerns answered.    Last menstrual period is reported as Patient's last menstrual period was 2020 (within weeks). GIDEON based on LMP is 2020.  Her cycles are irregular.  Her last menstrual period was shorter than usual.   Since her LMP, she has experienced  nausea, fatigue and headache.       Personal OB history includes: age of first pregnancy 26, natural births 1, G  2 and P  1  Previous OB Provider: RADHA  Previous Delivering [facility]: [facility])  Release of Records: n/a    Current delivery plan: [facility]  Preferred OB Provider: [facility]  Current Primary Care Provider: none  Pediatrician: Farhat Gonzalez MD     Additional History: Recently , with a new partner (FOB)    Have you travelled during the pregnancy?No  Have your sexual partner(s) travelled during the pregnancy?No      HISTORY:   Planned Pregnancy: Yes  Marital Status: , new relationship  Occupation:   Living in Household: Significant other, child and stepchildren     Father of the baby is involved.   Family and father of baby is supportive of current pregnancy.  Past Medical History of Father of Baby:No significant medical history    Past History:  Her past medical history is non-contributory.      She has a history of  one uncomplicated pregnancy with vaginal delivery    Since her last LMP she denies use of alcohol, tobacco and street drugs.    Pap smear history: YES - LSIL 2017, no follow up completed    STD/STI history: No STD history    STD/STI symptoms: no noticeable symptoms     Past medical, surgical, social and family history were  reviewed and updated in EPIC.    Medications reviewed by this nurse. Current medication list:  Current Outpatient Medications   Medication Sig Dispense Refill     Prenatal Vit-Fe Fumarate-FA (PRENATAL MULTIVITAMIN W/IRON) 27-0.8 MG tablet Take 1 tablet by mouth daily       The following medications were recommended to be discontinued due to Pregnancy Category D status: none  Patient informed to contact her primary care provider as soon as possible to discuss a safer alternative.    Risk factors:  Moderate and moderately severe risks (consult with OB/Gyn)  Previous fetal or  demise: No  History of  delivery: No  History of heart disease Class I: No  Severe anemia, unresponsive to iron therapy: No  Pelvic mass or neoplasm: No  Previous : No  Hyper/hypothyroidism: No  History of postpartum hemorrhage requiring transfusion:No  History of Placenta Accreta: No    High Risk (Pregnancy managed by OB/Gyn)  Multiple pregnancy: No  Pre-gestational diabetes: No  Chronic Hypertension: No  Renal Failure: No  Heart disease, class II or greater: No  Rh Isoimmunization: No  Chronic active hepatitis: No  Convulsive disorder, poorly controlled: No  Isoimmune thrombocytopenia: No  Pre-term premature rupture of membranes: No  Lupus or other autoimmune disorder: No  Human Immunodeficiency Virus: No      ASSESSMENT/PLAN:       ICD-10-CM    1. Encounter to determine fetal viability of pregnancy, single or unspecified fetus  O36.80X0  OB < 14 Weeks Single       29 year old , 8-10 weeks of pregnancy with GIDEON of 2020, by Last Menstrual Period    Per standing orders and scope of practice of this nurse, patient will have the following orders placed and completed prior to initial OB visit with the appropriate provider:    --early ultrasound for dating and viability ordered for 6+ weeks gestation based on LMP    --Quantitative Beta HCG and progesterone monitoring if indicated    Counseling given:     -  Recommended weight gain for pregnancy: 15-25 lbs.   BMI < 18.5  28-40 lbs   18.5 - 24.9 25-35   25 - 29.9 15-25   > 30  < 15       PLAN/PATIENT INSTRUCTIONS:    Follow up as soon as possible.  Normal exercise.  Normal sexual activity.  Prenatal vitamins.  Anticipated weight gain.    follow-up appointment with Dr. Swati Randle MD for pre- care and take multivitamin or pre- vitamins    Susan Marroquin RN.................................................. 2020 11:16 AM

## 2020-05-26 ENCOUNTER — PRENATAL OFFICE VISIT (OUTPATIENT)
Dept: OBGYN | Facility: OTHER | Age: 30
End: 2020-05-26
Attending: OBSTETRICS & GYNECOLOGY
Payer: MEDICAID

## 2020-05-26 ENCOUNTER — HOSPITAL ENCOUNTER (OUTPATIENT)
Dept: ULTRASOUND IMAGING | Facility: OTHER | Age: 30
End: 2020-05-26
Attending: OBSTETRICS & GYNECOLOGY
Payer: MEDICAID

## 2020-05-26 VITALS
HEIGHT: 66 IN | WEIGHT: 200 LBS | HEART RATE: 120 BPM | SYSTOLIC BLOOD PRESSURE: 126 MMHG | BODY MASS INDEX: 32.14 KG/M2 | DIASTOLIC BLOOD PRESSURE: 72 MMHG

## 2020-05-26 DIAGNOSIS — Z11.51 SCREENING FOR HUMAN PAPILLOMAVIRUS: ICD-10-CM

## 2020-05-26 DIAGNOSIS — Z34.81 ENCOUNTER FOR SUPERVISION OF OTHER NORMAL PREGNANCY IN FIRST TRIMESTER: Primary | ICD-10-CM

## 2020-05-26 DIAGNOSIS — O36.80X0 ENCOUNTER TO DETERMINE FETAL VIABILITY OF PREGNANCY, SINGLE OR UNSPECIFIED FETUS: ICD-10-CM

## 2020-05-26 DIAGNOSIS — O21.9 NAUSEA AND VOMITING IN PREGNANCY: ICD-10-CM

## 2020-05-26 LAB
ABO + RH BLD: NORMAL
ABO + RH BLD: NORMAL
BLD GP AB SCN SERPL QL: NORMAL
BLOOD BANK CMNT PATIENT-IMP: NORMAL
C TRACH DNA SPEC QL NAA+PROBE: NOT DETECTED
ERYTHROCYTE [DISTWIDTH] IN BLOOD BY AUTOMATED COUNT: 15 % (ref 10–15)
HCT VFR BLD AUTO: 40 % (ref 35–47)
HGB BLD-MCNC: 12.8 G/DL (ref 11.7–15.7)
MCH RBC QN AUTO: 24.5 PG (ref 26.5–33)
MCHC RBC AUTO-ENTMCNC: 32 G/DL (ref 31.5–36.5)
MCV RBC AUTO: 77 FL (ref 78–100)
N GONORRHOEA DNA SPEC QL NAA+PROBE: NOT DETECTED
PLATELET # BLD AUTO: 359 10E9/L (ref 150–450)
RBC # BLD AUTO: 5.22 10E12/L (ref 3.8–5.2)
SPECIMEN EXP DATE BLD: NORMAL
SPECIMEN SOURCE: NORMAL
WBC # BLD AUTO: 12.7 10E9/L (ref 4–11)

## 2020-05-26 PROCEDURE — G0123 SCREEN CERV/VAG THIN LAYER: HCPCS | Performed by: OBSTETRICS & GYNECOLOGY

## 2020-05-26 PROCEDURE — 86701 HIV-1ANTIBODY: CPT | Mod: ZL | Performed by: OBSTETRICS & GYNECOLOGY

## 2020-05-26 PROCEDURE — 85027 COMPLETE CBC AUTOMATED: CPT | Mod: ZL | Performed by: OBSTETRICS & GYNECOLOGY

## 2020-05-26 PROCEDURE — 87491 CHLMYD TRACH DNA AMP PROBE: CPT | Mod: ZL | Performed by: OBSTETRICS & GYNECOLOGY

## 2020-05-26 PROCEDURE — 87389 HIV-1 AG W/HIV-1&-2 AB AG IA: CPT | Mod: ZL | Performed by: OBSTETRICS & GYNECOLOGY

## 2020-05-26 PROCEDURE — 86702 HIV-2 ANTIBODY: CPT | Mod: ZL | Performed by: OBSTETRICS & GYNECOLOGY

## 2020-05-26 PROCEDURE — 76801 OB US < 14 WKS SINGLE FETUS: CPT

## 2020-05-26 PROCEDURE — 99207 ZZC OB VISIT-NO CHARGE - GICH ONLY: CPT | Performed by: OBSTETRICS & GYNECOLOGY

## 2020-05-26 PROCEDURE — 86780 TREPONEMA PALLIDUM: CPT | Mod: ZL | Performed by: OBSTETRICS & GYNECOLOGY

## 2020-05-26 PROCEDURE — 88142 CYTOPATH C/V THIN LAYER: CPT | Performed by: OBSTETRICS & GYNECOLOGY

## 2020-05-26 PROCEDURE — 86762 RUBELLA ANTIBODY: CPT | Mod: ZL | Performed by: OBSTETRICS & GYNECOLOGY

## 2020-05-26 PROCEDURE — 86900 BLOOD TYPING SEROLOGIC ABO: CPT | Mod: ZL | Performed by: OBSTETRICS & GYNECOLOGY

## 2020-05-26 PROCEDURE — 40001026 ZZHCL STATISTICAL PAP TEST QC: Performed by: OBSTETRICS & GYNECOLOGY

## 2020-05-26 PROCEDURE — 87591 N.GONORRHOEAE DNA AMP PROB: CPT | Mod: ZL | Performed by: OBSTETRICS & GYNECOLOGY

## 2020-05-26 PROCEDURE — 87086 URINE CULTURE/COLONY COUNT: CPT | Mod: ZL | Performed by: OBSTETRICS & GYNECOLOGY

## 2020-05-26 PROCEDURE — 36415 COLL VENOUS BLD VENIPUNCTURE: CPT | Mod: ZL | Performed by: OBSTETRICS & GYNECOLOGY

## 2020-05-26 PROCEDURE — 86901 BLOOD TYPING SEROLOGIC RH(D): CPT | Mod: ZL | Performed by: OBSTETRICS & GYNECOLOGY

## 2020-05-26 PROCEDURE — G0499 HEPB SCREEN HIGH RISK INDIV: HCPCS | Mod: ZL | Performed by: OBSTETRICS & GYNECOLOGY

## 2020-05-26 PROCEDURE — 86850 RBC ANTIBODY SCREEN: CPT | Mod: ZL | Performed by: OBSTETRICS & GYNECOLOGY

## 2020-05-26 RX ORDER — PROMETHAZINE HYDROCHLORIDE 25 MG/1
25 TABLET ORAL EVERY 6 HOURS PRN
Qty: 20 TABLET | Refills: 1 | Status: SHIPPED | OUTPATIENT
Start: 2020-05-26 | End: 2021-01-29

## 2020-05-26 ASSESSMENT — MIFFLIN-ST. JEOR: SCORE: 1658.94

## 2020-05-26 ASSESSMENT — PAIN SCALES - GENERAL: PAINLEVEL: NO PAIN (0)

## 2020-05-26 NOTE — PROGRESS NOTES
New Obstetrics Visit    HPI: 29 year old  at 7w1d by 7w1d US here today for initial OB visit. Her periods were irregular prior to conception. This was a somewhat planned pregnancy, wasn't trying but wasn't preventing. She denies cramping or VB since LMP. She has been very nauseated, vomiting up to 10 times per day. Denies weight loss. Previously tried zofran in last pregnancy but didn't think it helped. This is a new FOB- Wild. Together x 1.5 years.     OBHx  OB History    Para Term  AB Living   2 1 1 0 0 1   SAB TAB Ectopic Multiple Live Births   0 0 0 0 1      # Outcome Date GA Lbr Paul/2nd Weight Sex Delivery Anes PTL Lv   2 Current            1 Term 18 40w3d 06:36 / 00:22 3.308 kg (7 lb 4.7 oz) F Vag-Spont IV REGIONAL N FREDDIE      Name: YANIRA RIVERO      Apgar1: 9  Apgar5: 9         PMHx:   Past Medical History:   Diagnosis Date     No pertinent past medical history       PSHx:   Past Surgical History:   Procedure Laterality Date     OTHER SURGICAL HISTORY      [id number],NO PAST SURGERIES      Meds:   Current Outpatient Medications   Medication     promethazine (PHENERGAN) 25 MG tablet     Prenatal Vit-Fe Fumarate-FA (PRENATAL MULTIVITAMIN W/IRON) 27-0.8 MG tablet     No current facility-administered medications for this visit.      Allergies:   No Known Allergies    SocHx:   Social History     Tobacco Use     Smoking status: Never Smoker     Smokeless tobacco: Never Used   Substance Use Topics     Alcohol use: No     Alcohol/week: 0.0 standard drinks     Drug use: No     Lives with her boyfriend, Wild, her daughter Alanna, and his 3 kids (part-time). Was working at [employer]  but had to stop 2 weeks ago because of nausea. Alanna's father is not involved, recently released from nursing home    FamHx:   Family History   Problem Relation Age of Onset     Hypertension Father         Hypertension     Myocardial Infarction Father 50        ROS: 10-Point ROS  "negative except as noted in HPI      Physical Exam  /72 (BP Location: Right arm, Patient Position: Sitting, Cuff Size: Adult Large)   Pulse 120   Ht 1.676 m (5' 6\")   Wt 90.7 kg (200 lb)   LMP  (LMP Unknown)   Breastfeeding No   BMI 32.28 kg/m    Body mass index is 32.28 kg/m .  Gen: Well-appearing, NAD  HEENT: Normocephalic, atraumatic  Neck: Thyroid is not enlarged, no appreciable masses palpated. Non-tender  CV:  RRR, no m/r/g auscultated  Pulm: CTAB, no w/r/r auscultated  Abd: Soft, non-tender, non-distended  Ext: No LE edema, extremities warm and well perfused    Pelvic:  Normal appearing external female genitalia. No vaginal lesions. Moderate white discharge. Cervix normal, no lesions. Uterus is small, mobile, non-tender, anteverted. No adnexal tenderness or masses    Pap collected    Assessment/Plan:  Ms. Tasia Melgar is a 29 year old  at 7w1d by 7w1d US, here for new OB visit. Pregnancy is complicated by hx of LSIL pap without follow up.  1. New OB labs ord'd  2. Nausea, vomiting: will try phenergan  3. Genetics: declines  4. Imaging: dating US at 7w1d  5. Immunizations: none    Follow up in 4 weeks.    Swati Randle MD  OB/GYN  2020 2:23 PM     "

## 2020-05-26 NOTE — NURSING NOTE
Chief Complaint   Patient presents with     Prenatal Care     OBPX   LMP unsure        Medication Reconciliation: completed   Yuliana Ha LPN  5/26/2020 1:11 PM

## 2020-05-28 LAB
BACTERIA SPEC CULT: NORMAL
HBV SURFACE AG SERPL QL IA: NONREACTIVE
HIV 1 & 2 AB SERPL IA.RAPID: ABNORMAL
HIV 1 & 2 AB SERPL IA.RAPID: ABNORMAL
HIV 1+2 AB+HIV1 P24 AG SERPL QL IA: REACTIVE
HIV 1+2 AB+HIV1P24 AG SERPLBLD IA.RAPID: ABNORMAL
RUBV IGG SERPL IA-ACNC: 24 IU/ML
SPECIMEN SOURCE: NORMAL
T PALLIDUM AB SER QL: NONREACTIVE

## 2020-06-02 DIAGNOSIS — Z11.3 ROUTINE SCREENING FOR STI (SEXUALLY TRANSMITTED INFECTION): ICD-10-CM

## 2020-06-02 LAB
COPATH REPORT: NORMAL
MISCELLANEOUS TEST: NORMAL
PAP: NORMAL

## 2020-06-02 PROCEDURE — 84999 UNLISTED CHEMISTRY PROCEDURE: CPT | Mod: ZL | Performed by: OBSTETRICS & GYNECOLOGY

## 2020-06-02 PROCEDURE — 87536 HIV-1 QUANT&REVRSE TRNSCRPJ: CPT | Mod: ZL | Performed by: OBSTETRICS & GYNECOLOGY

## 2020-06-02 PROCEDURE — 36415 COLL VENOUS BLD VENIPUNCTURE: CPT | Mod: ZL | Performed by: OBSTETRICS & GYNECOLOGY

## 2020-06-02 PROCEDURE — 87539 HIV-2 QUANT&REVRSE TRNSCRIPJ: CPT | Mod: ZL | Performed by: OBSTETRICS & GYNECOLOGY

## 2020-06-05 LAB
HIV1 RNA # PLAS NAA DL=20: NORMAL {COPIES}/ML
HIV1 RNA SERPL NAA+PROBE-LOG#: NORMAL {LOG_COPIES}/ML

## 2020-06-12 LAB — LAB SCANNED RESULT: NORMAL

## 2020-06-23 ENCOUNTER — PRENATAL OFFICE VISIT (OUTPATIENT)
Dept: OBGYN | Facility: OTHER | Age: 30
End: 2020-06-23
Attending: OBSTETRICS & GYNECOLOGY
Payer: MEDICAID

## 2020-06-23 VITALS
HEART RATE: 84 BPM | DIASTOLIC BLOOD PRESSURE: 70 MMHG | BODY MASS INDEX: 32.44 KG/M2 | SYSTOLIC BLOOD PRESSURE: 122 MMHG | WEIGHT: 201 LBS

## 2020-06-23 DIAGNOSIS — Z34.81 ENCOUNTER FOR SUPERVISION OF OTHER NORMAL PREGNANCY IN FIRST TRIMESTER: Primary | ICD-10-CM

## 2020-06-23 PROCEDURE — 99207 ZZC OB VISIT-NO CHARGE - GICH ONLY: CPT | Performed by: OBSTETRICS & GYNECOLOGY

## 2020-06-23 ASSESSMENT — PAIN SCALES - GENERAL: PAINLEVEL: NO PAIN (0)

## 2020-06-23 NOTE — NURSING NOTE
Chief Complaint   Patient presents with     Prenatal Care     11w1d     Last few days has had less N/V.    Yuliana Ha LPN........................6/23/2020  12:51 PM     Medication Reconciliation: completed   Yuliana Ha LPN  6/23/2020 12:51 PM

## 2020-06-23 NOTE — PROGRESS NOTES
Return OB Visit    S: Patient has still been nauseated but last two days has been better. No cramping or VB.     O: /70 (BP Location: Right arm, Patient Position: Sitting, Cuff Size: Adult Large)   Pulse 84   Wt 91.2 kg (201 lb)   LMP  (LMP Unknown)   Breastfeeding No   BMI 32.44 kg/m    Gen: Well-appearing, NAD  See OB Flowsheet    A/P:  Tasia Melgra is a 29 year old  at 11w1d by 7w1d US, here for return OB visit.  Nausea, vomiting: phenergan prn, improving    PNC: Rh positive, Rubella immune  Genetics: declines  Imaging: dating US at 7w1d  Immunizations: none  RTC 4 weeks    Swati Randle MD  OB/GYN  2020 12:56 PM

## 2020-07-21 ENCOUNTER — PRENATAL OFFICE VISIT (OUTPATIENT)
Dept: OBGYN | Facility: OTHER | Age: 30
End: 2020-07-21
Attending: OBSTETRICS & GYNECOLOGY
Payer: MEDICAID

## 2020-07-21 VITALS
WEIGHT: 206.4 LBS | HEART RATE: 103 BPM | DIASTOLIC BLOOD PRESSURE: 60 MMHG | SYSTOLIC BLOOD PRESSURE: 114 MMHG | OXYGEN SATURATION: 99 % | BODY MASS INDEX: 33.31 KG/M2

## 2020-07-21 DIAGNOSIS — Z34.82 ENCOUNTER FOR SUPERVISION OF OTHER NORMAL PREGNANCY, SECOND TRIMESTER: Primary | ICD-10-CM

## 2020-07-21 PROCEDURE — G0463 HOSPITAL OUTPT CLINIC VISIT: HCPCS

## 2020-07-21 PROCEDURE — 99207 ZZC OB VISIT-NO CHARGE - GICH ONLY: CPT | Performed by: OBSTETRICS & GYNECOLOGY

## 2020-07-21 ASSESSMENT — PAIN SCALES - GENERAL: PAINLEVEL: NO PAIN (0)

## 2020-07-21 NOTE — PROGRESS NOTES
Return OB Visit    S: Patient is feeling about the same. Still nauseated. Doesn't feel like the meds are helpful- help her not vomit but not making her less nauseated. She doesn't want to try anything different. No cramping or VB.     O: /60 (BP Location: Right arm, Patient Position: Sitting, Cuff Size: Adult Large)   Pulse 103   Wt 93.6 kg (206 lb 6.4 oz)   LMP  (LMP Unknown)   SpO2 99%   Breastfeeding No   BMI 33.31 kg/m    Gen: Well-appearing, NAD  See OB Flowsheet    A/P:  Tasia Melgar is a 29 year old  at 15w1d by 7w1d US, here for return OB visit.  Nausea, vomiting: phenergan prn, unisom nightly     PNC: Rh positive, Rubella immune  Genetics: declines  Imaging: dating US at 7w1d, anatomy survey ord'd  Immunizations: none  RTC 4 weeks    Swati Randle MD  OB/GYN  2020 1:48 PM

## 2020-07-21 NOTE — NURSING NOTE
Patient is here for ob check, she is 15w1d.  Amber Kidd LPN .............7/21/2020     1:34 PM      No LMP recorded (lmp unknown). Patient is pregnant.  Medication Reconciliation: complete    Amber Kidd LPN  7/21/2020 1:38 PM

## 2020-08-18 ENCOUNTER — PRENATAL OFFICE VISIT (OUTPATIENT)
Dept: OBGYN | Facility: OTHER | Age: 30
End: 2020-08-18
Attending: OBSTETRICS & GYNECOLOGY
Payer: MEDICAID

## 2020-08-18 ENCOUNTER — HOSPITAL ENCOUNTER (OUTPATIENT)
Dept: ULTRASOUND IMAGING | Facility: OTHER | Age: 30
End: 2020-08-18
Attending: OBSTETRICS & GYNECOLOGY
Payer: MEDICAID

## 2020-08-18 VITALS
WEIGHT: 208 LBS | SYSTOLIC BLOOD PRESSURE: 118 MMHG | DIASTOLIC BLOOD PRESSURE: 72 MMHG | BODY MASS INDEX: 33.57 KG/M2 | HEART RATE: 72 BPM

## 2020-08-18 DIAGNOSIS — Z34.82 ENCOUNTER FOR SUPERVISION OF OTHER NORMAL PREGNANCY, SECOND TRIMESTER: Primary | ICD-10-CM

## 2020-08-18 DIAGNOSIS — Z34.82 ENCOUNTER FOR SUPERVISION OF OTHER NORMAL PREGNANCY, SECOND TRIMESTER: ICD-10-CM

## 2020-08-18 PROCEDURE — 76805 OB US >/= 14 WKS SNGL FETUS: CPT

## 2020-08-18 PROCEDURE — 99207 ZZC OB VISIT-NO CHARGE - GICH ONLY: CPT | Performed by: OBSTETRICS & GYNECOLOGY

## 2020-08-18 ASSESSMENT — PAIN SCALES - GENERAL: PAINLEVEL: NO PAIN (0)

## 2020-08-18 NOTE — NURSING NOTE
Chief Complaint   Patient presents with     Prenatal Care     19w1d       Offers no complaints   Yuliana Ha LPN........................8/18/2020  1:23 PM     Medication Reconciliation: completed   Yuliana Ha LPN  8/18/2020 1:22 PM

## 2020-08-18 NOTE — PROGRESS NOTES
Return OB Visit    S: Patient is feeling about the same, still nauseated. No cramping or VB.     O: /72 (BP Location: Right arm, Patient Position: Sitting, Cuff Size: Adult Large)   Pulse 72   Wt 94.3 kg (208 lb)   LMP  (LMP Unknown)   Breastfeeding No   BMI 33.57 kg/m    Gen: Well-appearing, NAD  See OB Flowsheet    A/P:  Tasia Melgar is a 29 year old  at 19w1d by 7w1d US, here for return OB visit.  Nausea, vomiting: phenergan prn, unisom nightly     PNC: Rh positive, Rubella immune  Genetics: declines  Imaging: dating US at 7w1d, anatomy survey normal but incomplete views- repeat ord'd  Immunizations: none  RTC 4 weeks    Swati Randle MD  OB/GYN  2020 1:13 PM

## 2020-09-15 ENCOUNTER — HOSPITAL ENCOUNTER (OUTPATIENT)
Dept: ULTRASOUND IMAGING | Facility: OTHER | Age: 30
End: 2020-09-15
Attending: OBSTETRICS & GYNECOLOGY
Payer: MEDICAID

## 2020-09-15 ENCOUNTER — PRENATAL OFFICE VISIT (OUTPATIENT)
Dept: OBGYN | Facility: OTHER | Age: 30
End: 2020-09-15
Attending: OBSTETRICS & GYNECOLOGY
Payer: MEDICAID

## 2020-09-15 VITALS
BODY MASS INDEX: 33.89 KG/M2 | HEART RATE: 104 BPM | DIASTOLIC BLOOD PRESSURE: 70 MMHG | SYSTOLIC BLOOD PRESSURE: 110 MMHG | WEIGHT: 210 LBS

## 2020-09-15 DIAGNOSIS — Z34.82 ENCOUNTER FOR SUPERVISION OF OTHER NORMAL PREGNANCY, SECOND TRIMESTER: Primary | ICD-10-CM

## 2020-09-15 DIAGNOSIS — Z34.82 ENCOUNTER FOR SUPERVISION OF OTHER NORMAL PREGNANCY, SECOND TRIMESTER: ICD-10-CM

## 2020-09-15 DIAGNOSIS — K21.9 GASTROESOPHAGEAL REFLUX DISEASE, ESOPHAGITIS PRESENCE NOT SPECIFIED: ICD-10-CM

## 2020-09-15 PROCEDURE — 76816 OB US FOLLOW-UP PER FETUS: CPT

## 2020-09-15 PROCEDURE — 99207 ZZC OB VISIT-NO CHARGE - GICH ONLY: CPT | Performed by: OBSTETRICS & GYNECOLOGY

## 2020-09-15 RX ORDER — FAMOTIDINE 20 MG/1
20 TABLET, FILM COATED ORAL 2 TIMES DAILY
Qty: 90 TABLET | Refills: 2 | Status: SHIPPED | OUTPATIENT
Start: 2020-09-15 | End: 2021-01-29

## 2020-09-15 ASSESSMENT — PAIN SCALES - GENERAL: PAINLEVEL: NO PAIN (0)

## 2020-09-15 NOTE — NURSING NOTE
Chief Complaint   Patient presents with     Prenatal Care     23w1d     Offers no complaints  Yuliana Ha LPN........................9/15/2020  1:04 PM       Medication Reconciliation: completed   Yuliana Ha LPN  9/15/2020 1:04 PM

## 2020-09-15 NOTE — PROGRESS NOTES
Return OB Visit    S: Patient is doing well. Nausea improving. No ctx, VB or LOF. +FM    O: /70 (BP Location: Right arm, Patient Position: Sitting, Cuff Size: Adult Large)   Pulse 104   Wt 95.3 kg (210 lb)   LMP  (LMP Unknown)   Breastfeeding No   BMI 33.89 kg/m    Gen: Well-appearing, NAD  See OB Flowsheet    A/P:  Tasia Melgar is a 29 year old  at 23w1d by 7w1d US, here for return OB visit.  Nausea, vomiting: improving. phenergan prn, unisom nightly  GERD: pepcid  Desires sterilization: will sign FTP next visit     PNC: Rh positive, Rubella immune  Genetics: declines  Imaging: dating US at 7w1d, anatomy survey normal but incomplete views- repeat pending from today  Immunizations: none  RTC 4 weeks- GCT, CBC, syphilis, Tdap, Flu and FTP next visit    Swati Randle MD  OB/GYN  9/15/2020 1:27 PM

## 2020-10-13 ENCOUNTER — PRENATAL OFFICE VISIT (OUTPATIENT)
Dept: OBGYN | Facility: OTHER | Age: 30
End: 2020-10-13
Attending: OBSTETRICS & GYNECOLOGY
Payer: MEDICAID

## 2020-10-13 VITALS
HEART RATE: 80 BPM | BODY MASS INDEX: 34.86 KG/M2 | DIASTOLIC BLOOD PRESSURE: 80 MMHG | WEIGHT: 216 LBS | SYSTOLIC BLOOD PRESSURE: 124 MMHG

## 2020-10-13 DIAGNOSIS — Z34.83 ENCOUNTER FOR SUPERVISION OF OTHER NORMAL PREGNANCY, THIRD TRIMESTER: Primary | ICD-10-CM

## 2020-10-13 LAB
ERYTHROCYTE [DISTWIDTH] IN BLOOD BY AUTOMATED COUNT: 14.6 % (ref 10–15)
GLUCOSE 1H P 50 G GLC PO SERPL-MCNC: 89 MG/DL (ref 60–129)
HCT VFR BLD AUTO: 35 % (ref 35–47)
HGB BLD-MCNC: 10.8 G/DL (ref 11.7–15.7)
MCH RBC QN AUTO: 22.5 PG (ref 26.5–33)
MCHC RBC AUTO-ENTMCNC: 30.9 G/DL (ref 31.5–36.5)
MCV RBC AUTO: 73 FL (ref 78–100)
PLATELET # BLD AUTO: 343 10E9/L (ref 150–450)
RBC # BLD AUTO: 4.79 10E12/L (ref 3.8–5.2)
WBC # BLD AUTO: 12.6 10E9/L (ref 4–11)

## 2020-10-13 PROCEDURE — 36415 COLL VENOUS BLD VENIPUNCTURE: CPT | Mod: ZL | Performed by: OBSTETRICS & GYNECOLOGY

## 2020-10-13 PROCEDURE — 99207 PR OB VISIT-NO CHARGE - GICH ONLY: CPT | Performed by: OBSTETRICS & GYNECOLOGY

## 2020-10-13 PROCEDURE — 85027 COMPLETE CBC AUTOMATED: CPT | Mod: ZL | Performed by: OBSTETRICS & GYNECOLOGY

## 2020-10-13 PROCEDURE — 86780 TREPONEMA PALLIDUM: CPT | Mod: ZL | Performed by: OBSTETRICS & GYNECOLOGY

## 2020-10-13 PROCEDURE — 82950 GLUCOSE TEST: CPT | Mod: ZL | Performed by: OBSTETRICS & GYNECOLOGY

## 2020-10-13 PROCEDURE — 90471 IMMUNIZATION ADMIN: CPT

## 2020-10-13 ASSESSMENT — PAIN SCALES - GENERAL: PAINLEVEL: NO PAIN (0)

## 2020-10-13 NOTE — PROGRESS NOTES
Return OB Visit    S: Patient is feeling well. No ctx, VB or LOF. +FM    O: /80 (BP Location: Right arm, Patient Position: Sitting, Cuff Size: Adult Large)   Pulse 80   Wt 98 kg (216 lb)   LMP  (LMP Unknown)   Breastfeeding No   BMI 34.86 kg/m    Gen: Well-appearing, NAD  See OB Flowsheet    A/P:  Tasia Melgar is a 29 year old  at 27w1d by 7w1d US, here for return OB visit.  Nausea, vomiting: improving. phenergan prn, unisom nightly  GERD: pepcid  Desires sterilization: FTP signed 10/13/2020   Plans bottle feeding, no epidural     PNC: Rh positive, Rubella immune  Genetics: declines  Imaging: dating US at 7w1d, anatomy survey normal after follow up  Immunizations: Tdap 10/13/2020, declines flu  RTC 4 weeks    Swati Randle MD  OB/GYN  10/13/2020 1:50 PM

## 2020-10-13 NOTE — NURSING NOTE
Chief Complaint   Patient presents with     Prenatal Care     27w1d     Offers no complaints  Yuliana Ha LPN........................10/13/2020  2:04 PM     Medication Reconciliation: completed   Yuliana Ha LPN  10/13/2020 2:04 PM

## 2020-10-13 NOTE — NURSING NOTE
Immunization Documentation    Prior to Immunization administration, verified patients identity using patient's name and date of birth. Please see IMMUNIZATIONS  and order for additional information.  Patient / Parent instructed to remain in clinic for 15 minutes and report any adverse reaction to staff immediately.    Was entire vial of medication used? Yes  Vial/Syringe: Single dose vial    Susan Marroquin RN  10/13/2020   2:28 PM

## 2020-10-15 ENCOUNTER — ALLIED HEALTH/NURSE VISIT (OUTPATIENT)
Dept: FAMILY MEDICINE | Facility: OTHER | Age: 30
End: 2020-10-15
Attending: FAMILY MEDICINE
Payer: MEDICAID

## 2020-10-15 DIAGNOSIS — Z20.822 EXPOSURE TO COVID-19 VIRUS: ICD-10-CM

## 2020-10-15 DIAGNOSIS — R06.02 SOB (SHORTNESS OF BREATH): Primary | ICD-10-CM

## 2020-10-15 DIAGNOSIS — R07.0 THROAT PAIN: ICD-10-CM

## 2020-10-15 LAB — T PALLIDUM AB SER QL: NONREACTIVE

## 2020-10-15 PROCEDURE — C9803 HOPD COVID-19 SPEC COLLECT: HCPCS

## 2020-10-15 PROCEDURE — U0003 INFECTIOUS AGENT DETECTION BY NUCLEIC ACID (DNA OR RNA); SEVERE ACUTE RESPIRATORY SYNDROME CORONAVIRUS 2 (SARS-COV-2) (CORONAVIRUS DISEASE [COVID-19]), AMPLIFIED PROBE TECHNIQUE, MAKING USE OF HIGH THROUGHPUT TECHNOLOGIES AS DESCRIBED BY CMS-2020-01-R: HCPCS | Mod: ZL | Performed by: FAMILY MEDICINE

## 2020-10-15 PROCEDURE — 99207 PR NO CHARGE LOS: CPT

## 2020-10-17 LAB
SARS-COV-2 RNA SPEC QL NAA+PROBE: NOT DETECTED
SPECIMEN SOURCE: NORMAL

## 2020-11-11 ENCOUNTER — PRENATAL OFFICE VISIT (OUTPATIENT)
Dept: OBGYN | Facility: OTHER | Age: 30
End: 2020-11-11
Attending: OBSTETRICS & GYNECOLOGY
Payer: MEDICAID

## 2020-11-11 VITALS
SYSTOLIC BLOOD PRESSURE: 112 MMHG | WEIGHT: 223.2 LBS | BODY MASS INDEX: 36.03 KG/M2 | HEART RATE: 104 BPM | RESPIRATION RATE: 12 BRPM | DIASTOLIC BLOOD PRESSURE: 66 MMHG

## 2020-11-11 DIAGNOSIS — Z34.83 ENCOUNTER FOR SUPERVISION OF OTHER NORMAL PREGNANCY, THIRD TRIMESTER: Primary | ICD-10-CM

## 2020-11-11 PROCEDURE — 99207 PR OB VISIT-NO CHARGE - GICH ONLY: CPT | Performed by: OBSTETRICS & GYNECOLOGY

## 2020-11-11 ASSESSMENT — PAIN SCALES - GENERAL: PAINLEVEL: NO PAIN (0)

## 2020-11-11 NOTE — NURSING NOTE
"Chief Complaint   Patient presents with     Prenatal Care     31w 2d     Patient stated no concerns.    Initial /66 (BP Location: Right arm, Patient Position: Sitting, Cuff Size: Adult Large)   Pulse 104   Resp 12   Wt 101.2 kg (223 lb 3.2 oz)   LMP  (LMP Unknown)   Breastfeeding No   BMI 36.03 kg/m   Estimated body mass index is 36.03 kg/m  as calculated from the following:    Height as of 5/26/20: 1.676 m (5' 6\").    Weight as of this encounter: 101.2 kg (223 lb 3.2 oz).  Medication Reconciliation: Completed     Isaias Diamond LPN  "

## 2020-11-11 NOTE — PROGRESS NOTES
Return OB Visit    S: Patient is feeling well. No ctx, VB or LOF. +FM    O: /66 (BP Location: Right arm, Patient Position: Sitting, Cuff Size: Adult Large)   Pulse 104   Resp 12   Wt 101.2 kg (223 lb 3.2 oz)   LMP  (LMP Unknown)   Breastfeeding No   BMI 36.03 kg/m    Gen: Well-appearing, NAD  See OB Flowsheet    A/P:  Tasia Melgar is a 29 year old  at 31w2d by 7w1d US, here for return OB visit.  Nausea, vomiting: improving. phenergan prn, unisom nightly  GERD: pepcid  Desires sterilization: FTP signed 10/13/2020   Plans bottle feeding, no epidural     PNC: Rh positive, Rubella immune  Genetics: declines  Imaging: dating US at 7w1d, anatomy survey normal after follow up  Immunizations: Tdap 10/13/2020, declines flu  RTC 2 weeks    Swati Randle MD  OB/GYN  2020 2:35 PM

## 2020-11-24 ENCOUNTER — PRENATAL OFFICE VISIT (OUTPATIENT)
Dept: OBGYN | Facility: OTHER | Age: 30
End: 2020-11-24
Attending: OBSTETRICS & GYNECOLOGY
Payer: MEDICAID

## 2020-11-24 VITALS
HEART RATE: 80 BPM | SYSTOLIC BLOOD PRESSURE: 104 MMHG | BODY MASS INDEX: 36.96 KG/M2 | WEIGHT: 229 LBS | DIASTOLIC BLOOD PRESSURE: 62 MMHG

## 2020-11-24 DIAGNOSIS — Z34.83 ENCOUNTER FOR SUPERVISION OF OTHER NORMAL PREGNANCY, THIRD TRIMESTER: Primary | ICD-10-CM

## 2020-11-24 PROCEDURE — 99207 PR OB VISIT-NO CHARGE - GICH ONLY: CPT | Performed by: OBSTETRICS & GYNECOLOGY

## 2020-11-24 ASSESSMENT — PAIN SCALES - GENERAL: PAINLEVEL: NO PAIN (0)

## 2020-11-24 NOTE — LETTER
[facility]  [address]  [place] [address]  [phone]          November 24, 2020    RE:  Tasia Melgar                                                                                                                                                       [address]  [place] [address]            Tasia Melgar is under my professional care for her pregnancy. Due to risk of exposure to COVID-19 at term and being symptomatic at the time of delivery, she is not able to work starting 12/21/20 unless she is able to work exclusively from home.        Sincerely,        Swati Randle MD

## 2020-11-24 NOTE — PROGRESS NOTES
Return OB Visit    S: Patient is feeling well. Getting uncomfortable. Some ctx with activity. No VB or LOF. +FM    O: /62 (BP Location: Right arm, Patient Position: Sitting, Cuff Size: Adult Large)   Pulse 80   Wt 103.9 kg (229 lb)   LMP  (LMP Unknown)   Breastfeeding No   BMI 36.96 kg/m    Gen: Well-appearing, NAD  See OB Flowsheet    A/P:  Tasia Melgar is a 29 year old  at 33w1d by 7w1d US, here for return OB visit.  Nausea, vomiting: improving. phenergan prn, unisom nightly  GERD: pepcid  Desires sterilization: FTP signed 10/13/2020   Plans bottle feeding, no epidural  Letter provided to work from home starting at 37 weeks     PNC: Rh positive, Rubella immune  Genetics: declines  Imaging: dating US at 7w1d, anatomy survey normal after follow up  Immunizations: Tdap 10/13/2020, declines flu  RTC 2 weeks    Swati Randle MD  OB/GYN  2020 2:05 PM

## 2020-11-24 NOTE — NURSING NOTE
Chief Complaint   Patient presents with     Prenatal Care     33w1d     Offers no complaints   Yuliana Ha LPN........................11/24/2020  2:04 PM     Medication Reconciliation: completed   Yuliana Ha LPN  11/24/2020 2:03 PM

## 2020-12-09 ENCOUNTER — PRENATAL OFFICE VISIT (OUTPATIENT)
Dept: OBGYN | Facility: OTHER | Age: 30
End: 2020-12-09
Attending: OBSTETRICS & GYNECOLOGY
Payer: MEDICAID

## 2020-12-09 VITALS
WEIGHT: 233 LBS | DIASTOLIC BLOOD PRESSURE: 66 MMHG | BODY MASS INDEX: 37.61 KG/M2 | HEART RATE: 120 BPM | SYSTOLIC BLOOD PRESSURE: 112 MMHG

## 2020-12-09 DIAGNOSIS — Z34.83 ENCOUNTER FOR SUPERVISION OF OTHER NORMAL PREGNANCY, THIRD TRIMESTER: Primary | ICD-10-CM

## 2020-12-09 PROCEDURE — 99207 PR OB VISIT-NO CHARGE - GICH ONLY: CPT | Performed by: OBSTETRICS & GYNECOLOGY

## 2020-12-09 ASSESSMENT — PAIN SCALES - GENERAL: PAINLEVEL: NO PAIN (0)

## 2020-12-09 NOTE — PROGRESS NOTES
Return OB Visit    S: Patient is feeling well, just uncomfortable. Some ctx, especially at night. No VB or LOF. +FM    O: /66 (BP Location: Right arm, Patient Position: Sitting, Cuff Size: Adult Large)   Pulse 120   Wt 105.7 kg (233 lb)   LMP  (LMP Unknown)   Breastfeeding No   BMI 37.61 kg/m    Gen: Well-appearing, NAD  See OB Flowsheet    A/P:  Tasia Melgar is a 29 year old  at 35w2d by 7w1d US, here for return OB visit.  Nausea, vomiting: improving. phenergan prn, unisom nightly  GERD: pepcid  Desires sterilization: FTP signed 10/13/2020   Plans bottle feeding, no epidural  Letter provided to work from home starting at 37 weeks  Reactive HIV antibody screen with negative confirmatory testing     PNC: Rh positive, Rubella immune, GCT 89  Genetics: declines  Imaging: dating US at 7w1d, anatomy survey normal after follow up  Immunizations: Tdap 10/13/2020, declines flu  RTC weekly until delivery    Swati Randle MD  OB/GYN  2020 2:09 PM

## 2020-12-09 NOTE — NURSING NOTE
Chief Complaint   Patient presents with     Prenatal Care     35w2d   offers no complaints   Yuliana Ha LPN........................12/9/2020  2:12 PM       Medication Reconciliation: completed   Yuliana Ha LPN  12/9/2020 2:12 PM

## 2020-12-15 ENCOUNTER — PRENATAL OFFICE VISIT (OUTPATIENT)
Dept: OBGYN | Facility: OTHER | Age: 30
End: 2020-12-15
Attending: OBSTETRICS & GYNECOLOGY
Payer: MEDICAID

## 2020-12-15 VITALS
OXYGEN SATURATION: 100 % | HEART RATE: 108 BPM | SYSTOLIC BLOOD PRESSURE: 108 MMHG | WEIGHT: 233 LBS | DIASTOLIC BLOOD PRESSURE: 78 MMHG | BODY MASS INDEX: 37.61 KG/M2

## 2020-12-15 DIAGNOSIS — Z34.93 NORMAL PREGNANCY IN THIRD TRIMESTER: Primary | ICD-10-CM

## 2020-12-15 PROCEDURE — 99207 PR OB VISIT-NO CHARGE - GICH ONLY: CPT | Performed by: OBSTETRICS & GYNECOLOGY

## 2020-12-15 ASSESSMENT — PAIN SCALES - GENERAL: PAINLEVEL: NO PAIN (0)

## 2020-12-15 NOTE — PROGRESS NOTES
CC: Recheck OB visit at 36w1d    HPI: Tasia Rivero presents for a routine OB visit now at 36w1d  She has no concerns. Denies cramping, bleeding, normal fetal movement    OB History    Para Term  AB Living   2 1 1 0 0 1   SAB TAB Ectopic Multiple Live Births   0 0 0 0 1      # Outcome Date GA Lbr Paul/2nd Weight Sex Delivery Anes PTL Lv   2 Current            1 Term 18 40w3d 06:36 / 00:22 3.308 kg (7 lb 4.7 oz) F Vag-Spont IV REGIONAL N FREDDIE      Name: YANIRA RIVERO      Apgar1: 9  Apgar5: 9     Current Outpatient Medications   Medication     famotidine (PEPCID) 20 MG tablet     Prenatal Vit-Fe Fumarate-FA (PRENATAL MULTIVITAMIN W/IRON) 27-0.8 MG tablet     promethazine (PHENERGAN) 25 MG tablet     No current facility-administered medications for this visit.          O: /78 (BP Location: Right arm, Patient Position: Sitting, Cuff Size: Adult Large)   Pulse 108   Wt 105.7 kg (233 lb)   LMP  (LMP Unknown)   SpO2 100%   BMI 37.61 kg/m    Body mass index is 37.61 kg/m .  See OB flow sheet  EXAM:  NAD  FH:37 cm  FHT: 140 bpm    No results found for any visits on 12/15/20.    A/P: (Z34.93) Normal pregnancy in third trimester  (primary encounter diagnosis)  Comment: Patient deferred GBS until next week.    Recheck in 1 week      Problem List:       Phani Good MD FACOG  2:08 PM 12/15/2020

## 2020-12-15 NOTE — NURSING NOTE
Patient here for prenatal care, denies questions concerns, leaking of fluid and states baby movement is as normal.  Medication Reconciliation: complete    Robert Ang LPN  12/15/2020 2:00 PM

## 2020-12-23 ENCOUNTER — PRENATAL OFFICE VISIT (OUTPATIENT)
Dept: OBGYN | Facility: OTHER | Age: 30
End: 2020-12-23
Attending: OBSTETRICS & GYNECOLOGY
Payer: MEDICAID

## 2020-12-23 VITALS
WEIGHT: 234 LBS | SYSTOLIC BLOOD PRESSURE: 118 MMHG | DIASTOLIC BLOOD PRESSURE: 72 MMHG | BODY MASS INDEX: 37.77 KG/M2 | HEART RATE: 120 BPM

## 2020-12-23 DIAGNOSIS — Z34.93 NORMAL PREGNANCY IN THIRD TRIMESTER: Primary | ICD-10-CM

## 2020-12-23 PROCEDURE — 99207 PR OB VISIT-NO CHARGE - GICH ONLY: CPT | Performed by: OBSTETRICS & GYNECOLOGY

## 2020-12-23 PROCEDURE — 87081 CULTURE SCREEN ONLY: CPT | Mod: ZL | Performed by: OBSTETRICS & GYNECOLOGY

## 2020-12-23 ASSESSMENT — PAIN SCALES - GENERAL: PAINLEVEL: NO PAIN (0)

## 2020-12-23 NOTE — NURSING NOTE
Chief Complaint   Patient presents with     Prenatal Care     37w2d     Offers no complaints  Yuliana Ha LPN........................12/23/2020  1:52 PM     Medication Reconciliation: completed   Yuliana Ha LPN  12/23/2020 1:52 PM

## 2020-12-23 NOTE — PROGRESS NOTES
Return OB Visit    S: Patient is uncomfortable. Random ctx. No VB or LOF. +FM    O: /72 (BP Location: Right arm, Patient Position: Sitting, Cuff Size: Adult Large)   Pulse 120   Wt 106.1 kg (234 lb)   LMP  (LMP Unknown)   Breastfeeding No   BMI 37.77 kg/m    Gen: Well-appearing, NAD  See OB Flowsheet    A/P:  Tasia Melgar is a 30 year old  at 37w2d by 7w1d US, here for return OB visit.  Nausea, vomiting: improving. phenergan prn, unisom nightly  GERD: pepcid  Desires sterilization: FTP signed 10/13/2020   Plans bottle feeding, no epidural  Letter provided to work from home starting at 37 weeks  Reactive HIV antibody screen with negative confirmatory testing     PNC: Rh positive, Rubella immune, GCT 89  Genetics: declines  Imaging: dating US at 7w1d, anatomy survey normal after follow up  Immunizations: Tdap 10/13/2020, declines flu  RTC weekly until delivery     Swati Randle MD  OB/GYN  2020 2:14 PM

## 2020-12-25 LAB
BACTERIA SPEC CULT: NORMAL
SPECIMEN SOURCE: NORMAL

## 2020-12-29 ENCOUNTER — PRENATAL OFFICE VISIT (OUTPATIENT)
Dept: OBGYN | Facility: OTHER | Age: 30
End: 2020-12-29
Attending: OBSTETRICS & GYNECOLOGY
Payer: MEDICAID

## 2020-12-29 VITALS
WEIGHT: 235 LBS | DIASTOLIC BLOOD PRESSURE: 60 MMHG | BODY MASS INDEX: 37.93 KG/M2 | HEART RATE: 116 BPM | SYSTOLIC BLOOD PRESSURE: 112 MMHG

## 2020-12-29 DIAGNOSIS — Z34.83 ENCOUNTER FOR SUPERVISION OF OTHER NORMAL PREGNANCY, THIRD TRIMESTER: Primary | ICD-10-CM

## 2020-12-29 PROCEDURE — 99207 PR OB VISIT-NO CHARGE - GICH ONLY: CPT | Performed by: OBSTETRICS & GYNECOLOGY

## 2020-12-29 ASSESSMENT — PAIN SCALES - GENERAL: PAINLEVEL: NO PAIN (0)

## 2020-12-29 NOTE — NURSING NOTE
Chief Complaint   Patient presents with     Prenatal Care     38w1d     Offers no complaints   Yuliana Ha LPN........................12/29/2020  2:05 PM     Medication Reconciliation: completed   Yuliana Ha LPN  12/29/2020 2:05 PM

## 2020-12-29 NOTE — PROGRESS NOTES
Return OB Visit    S: Patient is feeling well, just uncomfortable and ready to be done. More ctx lately. No VB or LOF. +FM    O: /60 (BP Location: Right arm, Patient Position: Sitting, Cuff Size: Adult Large)   Pulse 116   Wt 106.6 kg (235 lb)   LMP  (LMP Unknown)   Breastfeeding No   BMI 37.93 kg/m    Gen: Well-appearing, NAD  See OB Flowsheet    A/P:  Tasia Melgar is a 30 year old  at 38w1d by 7w1d US, here for return OB visit.  Nausea, vomiting: phenergan prn, unisom nightly  GERD: pepcid  Desires sterilization: FTP signed 10/13/2020   Plans bottle feeding, no epidural  Letter provided to work from home starting at 37 weeks  Reactive HIV antibody screen with negative confirmatory testing     PNC: Rh positive, Rubella immune, GCT 89, GBS negative  Genetics: declines  Imaging: dating US at 7w1d, anatomy survey normal after follow up  Immunizations: Tdap 10/13/2020, declines flu  RTC weekly until delivery      Swati Randle MD  OB/GYN  2020 2:28 PM

## 2021-01-03 ENCOUNTER — HEALTH MAINTENANCE LETTER (OUTPATIENT)
Age: 31
End: 2021-01-03

## 2021-01-06 ENCOUNTER — PRENATAL OFFICE VISIT (OUTPATIENT)
Dept: OBGYN | Facility: OTHER | Age: 31
End: 2021-01-06
Attending: OBSTETRICS & GYNECOLOGY
Payer: MEDICAID

## 2021-01-06 VITALS
DIASTOLIC BLOOD PRESSURE: 70 MMHG | SYSTOLIC BLOOD PRESSURE: 110 MMHG | BODY MASS INDEX: 38.09 KG/M2 | HEART RATE: 88 BPM | WEIGHT: 236 LBS

## 2021-01-06 DIAGNOSIS — Z34.83 ENCOUNTER FOR SUPERVISION OF OTHER NORMAL PREGNANCY, THIRD TRIMESTER: Primary | ICD-10-CM

## 2021-01-06 PROCEDURE — 99207 PR OB VISIT-NO CHARGE - GICH ONLY: CPT | Performed by: OBSTETRICS & GYNECOLOGY

## 2021-01-06 RX ORDER — CARBOPROST TROMETHAMINE 250 UG/ML
250 INJECTION, SOLUTION INTRAMUSCULAR
Status: CANCELLED | OUTPATIENT
Start: 2021-01-06

## 2021-01-06 RX ORDER — METHYLERGONOVINE MALEATE 0.2 MG/ML
200 INJECTION INTRAVENOUS
Status: CANCELLED | OUTPATIENT
Start: 2021-01-06

## 2021-01-06 RX ORDER — IBUPROFEN 200 MG
800 TABLET ORAL
Status: CANCELLED | OUTPATIENT
Start: 2021-01-06

## 2021-01-06 RX ORDER — NALOXONE HYDROCHLORIDE 0.4 MG/ML
0.2 INJECTION, SOLUTION INTRAMUSCULAR; INTRAVENOUS; SUBCUTANEOUS
Status: CANCELLED | OUTPATIENT
Start: 2021-01-06

## 2021-01-06 RX ORDER — OXYTOCIN 10 [USP'U]/ML
10 INJECTION, SOLUTION INTRAMUSCULAR; INTRAVENOUS
Status: CANCELLED | OUTPATIENT
Start: 2021-01-06

## 2021-01-06 RX ORDER — SODIUM CHLORIDE, SODIUM LACTATE, POTASSIUM CHLORIDE, CALCIUM CHLORIDE 600; 310; 30; 20 MG/100ML; MG/100ML; MG/100ML; MG/100ML
INJECTION, SOLUTION INTRAVENOUS CONTINUOUS
Status: CANCELLED | OUTPATIENT
Start: 2021-01-06

## 2021-01-06 RX ORDER — ONDANSETRON 2 MG/ML
4 INJECTION INTRAMUSCULAR; INTRAVENOUS EVERY 6 HOURS PRN
Status: CANCELLED | OUTPATIENT
Start: 2021-01-06

## 2021-01-06 RX ORDER — NALOXONE HYDROCHLORIDE 0.4 MG/ML
0.4 INJECTION, SOLUTION INTRAMUSCULAR; INTRAVENOUS; SUBCUTANEOUS
Status: CANCELLED | OUTPATIENT
Start: 2021-01-06

## 2021-01-06 RX ORDER — LIDOCAINE 40 MG/G
CREAM TOPICAL
Status: CANCELLED | OUTPATIENT
Start: 2021-01-06

## 2021-01-06 RX ORDER — OXYCODONE AND ACETAMINOPHEN 5; 325 MG/1; MG/1
1 TABLET ORAL
Status: CANCELLED | OUTPATIENT
Start: 2021-01-06

## 2021-01-06 RX ORDER — ACETAMINOPHEN 325 MG/1
650 TABLET ORAL EVERY 4 HOURS PRN
Status: CANCELLED | OUTPATIENT
Start: 2021-01-06

## 2021-01-06 RX ORDER — TERBUTALINE SULFATE 1 MG/ML
0.25 INJECTION, SOLUTION SUBCUTANEOUS
Status: CANCELLED | OUTPATIENT
Start: 2021-01-06

## 2021-01-06 ASSESSMENT — PAIN SCALES - GENERAL: PAINLEVEL: NO PAIN (0)

## 2021-01-06 NOTE — NURSING NOTE
Chief Complaint   Patient presents with     Prenatal Care     39w2d     Offers no complaints  Yuliana Ha LPN........................1/6/2021  2:08 PM     Medication Reconciliation: completed   Yuliana Ha LPN  1/6/2021 2:08 PM

## 2021-01-06 NOTE — PROGRESS NOTES
Return OB Visit    S: Patient is uncomfortable and ready to be done. More ctx but not regular. No VB or LOF. +FM    O: /70 (BP Location: Right arm, Patient Position: Sitting, Cuff Size: Adult Large)   Pulse 88   Wt 107 kg (236 lb)   LMP  (LMP Unknown)   Breastfeeding No   BMI 38.09 kg/m    Gen: Well-appearing, NAD  See OB Flowsheet    A/P:  Tasia Melgar is a 30 year old  at 39w2d by 7w1d US, here for return OB visit.  Nausea, vomiting: phenergan prn, unisom nightly  GERD: pepcid  Desires sterilization: FTP signed 10/13/2020   Plans bottle feeding, no epidural  Letter provided to work from home starting at 37 weeks  Reactive HIV antibody screen with negative confirmatory testing     PNC: Rh positive, Rubella immune, GCT 89, GBS negative  Genetics: declines  Imaging: dating US at 7w1d, anatomy survey normal after follow up  Immunizations: Tdap 10/13/2020, declines flu  RTC weekly until delivery- scheduled for IOL 20    Swati Randle MD  OB/GYN  2021 2:12 PM

## 2021-01-13 ENCOUNTER — ANESTHESIA EVENT (OUTPATIENT)
Dept: OBGYN | Facility: OTHER | Age: 31
End: 2021-01-13

## 2021-01-13 ENCOUNTER — HOSPITAL ENCOUNTER (INPATIENT)
Facility: OTHER | Age: 31
LOS: 2 days | Discharge: HOME | End: 2021-01-15
Attending: OBSTETRICS & GYNECOLOGY | Admitting: OBSTETRICS & GYNECOLOGY
Payer: MEDICAID

## 2021-01-13 ENCOUNTER — ANESTHESIA (OUTPATIENT)
Dept: OBGYN | Facility: OTHER | Age: 31
End: 2021-01-13
Payer: MEDICAID

## 2021-01-13 DIAGNOSIS — Z30.2 ENCOUNTER FOR STERILIZATION: ICD-10-CM

## 2021-01-13 DIAGNOSIS — Z34.83 ENCOUNTER FOR SUPERVISION OF OTHER NORMAL PREGNANCY, THIRD TRIMESTER: ICD-10-CM

## 2021-01-13 LAB
ABO + RH BLD: NORMAL
ABO + RH BLD: NORMAL
BASOPHILS # BLD AUTO: 0 10E9/L (ref 0–0.2)
BASOPHILS NFR BLD AUTO: 0.2 %
BLD GP AB SCN SERPL QL: NORMAL
BLOOD BANK CMNT PATIENT-IMP: NORMAL
DIFFERENTIAL METHOD BLD: ABNORMAL
EOSINOPHIL # BLD AUTO: 0.1 10E9/L (ref 0–0.7)
EOSINOPHIL NFR BLD AUTO: 0.9 %
ERYTHROCYTE [DISTWIDTH] IN BLOOD BY AUTOMATED COUNT: 16.3 % (ref 10–15)
HCT VFR BLD AUTO: 34 % (ref 35–47)
HGB BLD-MCNC: 9.8 G/DL (ref 11.7–15.7)
IMM GRANULOCYTES # BLD: 0.1 10E9/L (ref 0–0.4)
IMM GRANULOCYTES NFR BLD: 0.5 %
LYMPHOCYTES # BLD AUTO: 2.4 10E9/L (ref 0.8–5.3)
LYMPHOCYTES NFR BLD AUTO: 21.9 %
MCH RBC QN AUTO: 19.5 PG (ref 26.5–33)
MCHC RBC AUTO-ENTMCNC: 28.8 G/DL (ref 31.5–36.5)
MCV RBC AUTO: 68 FL (ref 78–100)
MONOCYTES # BLD AUTO: 0.5 10E9/L (ref 0–1.3)
MONOCYTES NFR BLD AUTO: 4.8 %
NEUTROPHILS # BLD AUTO: 8 10E9/L (ref 1.6–8.3)
NEUTROPHILS NFR BLD AUTO: 71.7 %
PLATELET # BLD AUTO: 387 10E9/L (ref 150–450)
RBC # BLD AUTO: 5.03 10E12/L (ref 3.8–5.2)
SPECIMEN EXP DATE BLD: NORMAL
T PALLIDUM AB SER QL: NONREACTIVE
WBC # BLD AUTO: 11.2 10E9/L (ref 4–11)

## 2021-01-13 PROCEDURE — 59400 OBSTETRICAL CARE: CPT | Performed by: NURSE ANESTHETIST, CERTIFIED REGISTERED

## 2021-01-13 PROCEDURE — U0005 INFEC AGEN DETEC AMPLI PROBE: HCPCS | Performed by: OBSTETRICS & GYNECOLOGY

## 2021-01-13 PROCEDURE — 86850 RBC ANTIBODY SCREEN: CPT | Performed by: OBSTETRICS & GYNECOLOGY

## 2021-01-13 PROCEDURE — 3E033VJ INTRODUCTION OF OTHER HORMONE INTO PERIPHERAL VEIN, PERCUTANEOUS APPROACH: ICD-10-PCS | Performed by: OBSTETRICS & GYNECOLOGY

## 2021-01-13 PROCEDURE — 250N000013 HC RX MED GY IP 250 OP 250 PS 637: Performed by: OBSTETRICS & GYNECOLOGY

## 2021-01-13 PROCEDURE — 86900 BLOOD TYPING SEROLOGIC ABO: CPT | Performed by: OBSTETRICS & GYNECOLOGY

## 2021-01-13 PROCEDURE — 250N000009 HC RX 250: Performed by: NURSE ANESTHETIST, CERTIFIED REGISTERED

## 2021-01-13 PROCEDURE — 722N000001 HC LABOR CARE VAGINAL DELIVERY SINGLE

## 2021-01-13 PROCEDURE — 85025 COMPLETE CBC W/AUTO DIFF WBC: CPT | Performed by: OBSTETRICS & GYNECOLOGY

## 2021-01-13 PROCEDURE — 250N000011 HC RX IP 250 OP 636: Performed by: NURSE ANESTHETIST, CERTIFIED REGISTERED

## 2021-01-13 PROCEDURE — 86780 TREPONEMA PALLIDUM: CPT | Performed by: OBSTETRICS & GYNECOLOGY

## 2021-01-13 PROCEDURE — 59400 OBSTETRICAL CARE: CPT | Performed by: OBSTETRICS & GYNECOLOGY

## 2021-01-13 PROCEDURE — U0003 INFECTIOUS AGENT DETECTION BY NUCLEIC ACID (DNA OR RNA); SEVERE ACUTE RESPIRATORY SYNDROME CORONAVIRUS 2 (SARS-COV-2) (CORONAVIRUS DISEASE [COVID-19]), AMPLIFIED PROBE TECHNIQUE, MAKING USE OF HIGH THROUGHPUT TECHNOLOGIES AS DESCRIBED BY CMS-2020-01-R: HCPCS | Performed by: OBSTETRICS & GYNECOLOGY

## 2021-01-13 PROCEDURE — 250N000011 HC RX IP 250 OP 636: Performed by: OBSTETRICS & GYNECOLOGY

## 2021-01-13 PROCEDURE — 120N000001 HC R&B MED SURG/OB

## 2021-01-13 PROCEDURE — 36415 COLL VENOUS BLD VENIPUNCTURE: CPT | Performed by: OBSTETRICS & GYNECOLOGY

## 2021-01-13 PROCEDURE — 258N000003 HC RX IP 258 OP 636: Performed by: OBSTETRICS & GYNECOLOGY

## 2021-01-13 PROCEDURE — 370N000003 HC ANESTHESIA WARD SERVICE

## 2021-01-13 PROCEDURE — 10907ZC DRAINAGE OF AMNIOTIC FLUID, THERAPEUTIC FROM PRODUCTS OF CONCEPTION, VIA NATURAL OR ARTIFICIAL OPENING: ICD-10-PCS | Performed by: OBSTETRICS & GYNECOLOGY

## 2021-01-13 PROCEDURE — 86901 BLOOD TYPING SEROLOGIC RH(D): CPT | Performed by: OBSTETRICS & GYNECOLOGY

## 2021-01-13 RX ORDER — CARBOPROST TROMETHAMINE 250 UG/ML
250 INJECTION, SOLUTION INTRAMUSCULAR
Status: DISCONTINUED | OUTPATIENT
Start: 2021-01-13 | End: 2021-01-13

## 2021-01-13 RX ORDER — NALOXONE HYDROCHLORIDE 0.4 MG/ML
0.4 INJECTION, SOLUTION INTRAMUSCULAR; INTRAVENOUS; SUBCUTANEOUS
Status: DISCONTINUED | OUTPATIENT
Start: 2021-01-13 | End: 2021-01-13

## 2021-01-13 RX ORDER — LIDOCAINE 40 MG/G
CREAM TOPICAL
Status: DISCONTINUED | OUTPATIENT
Start: 2021-01-13 | End: 2021-01-13

## 2021-01-13 RX ORDER — ONDANSETRON 2 MG/ML
4 INJECTION INTRAMUSCULAR; INTRAVENOUS EVERY 6 HOURS PRN
Status: DISCONTINUED | OUTPATIENT
Start: 2021-01-13 | End: 2021-01-13

## 2021-01-13 RX ORDER — AMOXICILLIN 250 MG
1 CAPSULE ORAL 2 TIMES DAILY
Status: DISCONTINUED | OUTPATIENT
Start: 2021-01-13 | End: 2021-01-15 | Stop reason: HOSPADM

## 2021-01-13 RX ORDER — SODIUM CHLORIDE, SODIUM LACTATE, POTASSIUM CHLORIDE, CALCIUM CHLORIDE 600; 310; 30; 20 MG/100ML; MG/100ML; MG/100ML; MG/100ML
INJECTION, SOLUTION INTRAVENOUS CONTINUOUS
Status: DISCONTINUED | OUTPATIENT
Start: 2021-01-13 | End: 2021-01-13

## 2021-01-13 RX ORDER — NALOXONE HYDROCHLORIDE 0.4 MG/ML
0.2 INJECTION, SOLUTION INTRAMUSCULAR; INTRAVENOUS; SUBCUTANEOUS
Status: DISCONTINUED | OUTPATIENT
Start: 2021-01-13 | End: 2021-01-13

## 2021-01-13 RX ORDER — MODIFIED LANOLIN
OINTMENT (GRAM) TOPICAL
Status: DISCONTINUED | OUTPATIENT
Start: 2021-01-13 | End: 2021-01-15 | Stop reason: HOSPADM

## 2021-01-13 RX ORDER — OXYTOCIN 10 [USP'U]/ML
10 INJECTION, SOLUTION INTRAMUSCULAR; INTRAVENOUS
Status: DISCONTINUED | OUTPATIENT
Start: 2021-01-13 | End: 2021-01-13

## 2021-01-13 RX ORDER — ACETAMINOPHEN 325 MG/1
650 TABLET ORAL EVERY 4 HOURS PRN
Status: DISCONTINUED | OUTPATIENT
Start: 2021-01-13 | End: 2021-01-15 | Stop reason: HOSPADM

## 2021-01-13 RX ORDER — HYDROCORTISONE 2.5 %
CREAM (GRAM) TOPICAL 3 TIMES DAILY PRN
Status: DISCONTINUED | OUTPATIENT
Start: 2021-01-13 | End: 2021-01-15 | Stop reason: HOSPADM

## 2021-01-13 RX ORDER — LIDOCAINE 40 MG/G
CREAM TOPICAL
Status: DISCONTINUED | OUTPATIENT
Start: 2021-01-13 | End: 2021-01-14

## 2021-01-13 RX ORDER — FENTANYL CITRATE-0.9 % NACL/PF 10 MCG/ML
100 PLASTIC BAG, INJECTION (ML) INTRAVENOUS EVERY 5 MIN PRN
Status: DISCONTINUED | OUTPATIENT
Start: 2021-01-13 | End: 2021-01-13

## 2021-01-13 RX ORDER — FENTANYL CITRATE 50 UG/ML
INJECTION, SOLUTION INTRAMUSCULAR; INTRAVENOUS PRN
Status: DISCONTINUED | OUTPATIENT
Start: 2021-01-13 | End: 2021-01-13

## 2021-01-13 RX ORDER — CITRIC ACID/SODIUM CITRATE 334-500MG
30 SOLUTION, ORAL ORAL ONCE
Status: COMPLETED | OUTPATIENT
Start: 2021-01-13 | End: 2021-01-14

## 2021-01-13 RX ORDER — NALBUPHINE HYDROCHLORIDE 10 MG/ML
2.5-5 INJECTION, SOLUTION INTRAMUSCULAR; INTRAVENOUS; SUBCUTANEOUS EVERY 6 HOURS PRN
Status: DISCONTINUED | OUTPATIENT
Start: 2021-01-13 | End: 2021-01-13

## 2021-01-13 RX ORDER — METHYLERGONOVINE MALEATE 0.2 MG/ML
200 INJECTION INTRAVENOUS
Status: DISCONTINUED | OUTPATIENT
Start: 2021-01-13 | End: 2021-01-13

## 2021-01-13 RX ORDER — AMOXICILLIN 250 MG
2 CAPSULE ORAL 2 TIMES DAILY
Status: DISCONTINUED | OUTPATIENT
Start: 2021-01-13 | End: 2021-01-15 | Stop reason: HOSPADM

## 2021-01-13 RX ORDER — OXYTOCIN 10 [USP'U]/ML
10 INJECTION, SOLUTION INTRAMUSCULAR; INTRAVENOUS
Status: DISCONTINUED | OUTPATIENT
Start: 2021-01-13 | End: 2021-01-15 | Stop reason: HOSPADM

## 2021-01-13 RX ORDER — SODIUM CHLORIDE, SODIUM LACTATE, POTASSIUM CHLORIDE, CALCIUM CHLORIDE 600; 310; 30; 20 MG/100ML; MG/100ML; MG/100ML; MG/100ML
INJECTION, SOLUTION INTRAVENOUS CONTINUOUS
Status: DISCONTINUED | OUTPATIENT
Start: 2021-01-13 | End: 2021-01-14

## 2021-01-13 RX ORDER — FENTANYL CITRATE 50 UG/ML
100 INJECTION, SOLUTION INTRAMUSCULAR; INTRAVENOUS ONCE
Status: COMPLETED | OUTPATIENT
Start: 2021-01-13 | End: 2021-01-13

## 2021-01-13 RX ORDER — IBUPROFEN 400 MG/1
800 TABLET, FILM COATED ORAL EVERY 6 HOURS PRN
Status: DISCONTINUED | OUTPATIENT
Start: 2021-01-13 | End: 2021-01-15 | Stop reason: HOSPADM

## 2021-01-13 RX ORDER — ACETAMINOPHEN 325 MG/1
650 TABLET ORAL EVERY 4 HOURS PRN
Status: DISCONTINUED | OUTPATIENT
Start: 2021-01-13 | End: 2021-01-13

## 2021-01-13 RX ORDER — OXYCODONE AND ACETAMINOPHEN 5; 325 MG/1; MG/1
1 TABLET ORAL
Status: DISCONTINUED | OUTPATIENT
Start: 2021-01-13 | End: 2021-01-13

## 2021-01-13 RX ORDER — LIDOCAINE HYDROCHLORIDE 10 MG/ML
INJECTION, SOLUTION INFILTRATION; PERINEURAL PRN
Status: DISCONTINUED | OUTPATIENT
Start: 2021-01-13 | End: 2021-01-13

## 2021-01-13 RX ORDER — BISACODYL 10 MG
10 SUPPOSITORY, RECTAL RECTAL DAILY PRN
Status: DISCONTINUED | OUTPATIENT
Start: 2021-01-15 | End: 2021-01-15 | Stop reason: HOSPADM

## 2021-01-13 RX ORDER — TERBUTALINE SULFATE 1 MG/ML
0.25 INJECTION, SOLUTION SUBCUTANEOUS
Status: DISCONTINUED | OUTPATIENT
Start: 2021-01-13 | End: 2021-01-13

## 2021-01-13 RX ORDER — MORPHINE SULFATE 0.5 MG/ML
INJECTION, SOLUTION EPIDURAL; INTRATHECAL; INTRAVENOUS PRN
Status: DISCONTINUED | OUTPATIENT
Start: 2021-01-13 | End: 2021-01-13

## 2021-01-13 RX ORDER — IBUPROFEN 400 MG/1
800 TABLET, FILM COATED ORAL
Status: DISCONTINUED | OUTPATIENT
Start: 2021-01-13 | End: 2021-01-13

## 2021-01-13 RX ADMIN — SODIUM CHLORIDE, POTASSIUM CHLORIDE, SODIUM LACTATE AND CALCIUM CHLORIDE: 600; 310; 30; 20 INJECTION, SOLUTION INTRAVENOUS at 19:37

## 2021-01-13 RX ADMIN — IBUPROFEN 800 MG: 400 TABLET, FILM COATED ORAL at 19:49

## 2021-01-13 RX ADMIN — DOCUSATE SODIUM 50 MG AND SENNOSIDES 8.6 MG 1 TABLET: 8.6; 5 TABLET, FILM COATED ORAL at 22:05

## 2021-01-13 RX ADMIN — MORPHINE SULFATE 0.1 MG: 0.5 INJECTION, SOLUTION EPIDURAL; INTRATHECAL; INTRAVENOUS at 16:30

## 2021-01-13 RX ADMIN — SODIUM CHLORIDE, POTASSIUM CHLORIDE, SODIUM LACTATE AND CALCIUM CHLORIDE 1000 ML: 600; 310; 30; 20 INJECTION, SOLUTION INTRAVENOUS at 16:39

## 2021-01-13 RX ADMIN — LIDOCAINE HYDROCHLORIDE 30 MG: 10 INJECTION, SOLUTION INFILTRATION; PERINEURAL at 16:28

## 2021-01-13 RX ADMIN — SODIUM CHLORIDE, POTASSIUM CHLORIDE, SODIUM LACTATE AND CALCIUM CHLORIDE 1000 ML: 600; 310; 30; 20 INJECTION, SOLUTION INTRAVENOUS at 14:28

## 2021-01-13 RX ADMIN — FENTANYL CITRATE 100 MCG: 50 INJECTION, SOLUTION INTRAMUSCULAR; INTRAVENOUS at 12:34

## 2021-01-13 RX ADMIN — FENTANYL CITRATE 15 MCG: 50 INJECTION, SOLUTION INTRAMUSCULAR; INTRAVENOUS at 16:30

## 2021-01-13 RX ADMIN — FENTANYL CITRATE 100 MCG: 50 INJECTION, SOLUTION INTRAMUSCULAR; INTRAVENOUS at 14:20

## 2021-01-13 RX ADMIN — OXYTOCIN 2 MILLI-UNITS/MIN: 10 INJECTION, SOLUTION INTRAMUSCULAR; INTRAVENOUS at 06:35

## 2021-01-13 RX ADMIN — SODIUM CHLORIDE, POTASSIUM CHLORIDE, SODIUM LACTATE AND CALCIUM CHLORIDE: 600; 310; 30; 20 INJECTION, SOLUTION INTRAVENOUS at 06:35

## 2021-01-13 ASSESSMENT — ACTIVITIES OF DAILY LIVING (ADL)
FALL_HISTORY_WITHIN_LAST_SIX_MONTHS: NO
VISION_MANAGEMENT: GLASSES
TOILETING_ISSUES: NO

## 2021-01-13 NOTE — ANESTHESIA PROCEDURE NOTES
Procedure note : intrathecal      Staff -   CRNA: Saul Mann APRN CRNA  Performed By: CRNA  Pre-Procedure    Location: OB    Procedure Times:1/13/2021 4:20 PM and 1/13/2021 4:38 PM  Pre-Anesthestic Checklist: patient identified, IV checked, risks and benefits discussed, informed consent, monitors and equipment checked, pre-op evaluation, at physician/surgeon's request and post-op pain management    Timeout  Correct Patient: Yes   Correct Procedure: Yes   Correct Site: Yes   Correct Laterality: Yes   Correct Position: Yes     .   Procedure Documentation  ASA 2  Diagnosis:Labor Pain.    Procedure: intrathecal, .   Patient Position:sitting Insertion Site:L3-4  (midline approach)     Patient Prep/Sterile Barriers; chlorhexidine gluconate and isopropyl alcohol.  .  Needle:  Spinal Needle (gauge): 25  Spinal/LP Needle Length (inches): 3.5 # of attempts: 1 and # of redirects:  Introducer used Introducer: 20 G .        Assessment/Narrative  Paresthesias: No.  .  .  clear CSF fluid removed . Time Injected:while sitting

## 2021-01-13 NOTE — PROGRESS NOTES
Up to soak in tub. Breathing well through contractions. Having irregular contractions, FHT's 150's.

## 2021-01-13 NOTE — PROGRESS NOTES
Intrapartum Progress Note    S: Patient is really uncomfortable with ctx, mostly in her back. No rectal or vaginal pressure     O: /79   Pulse 78   Temp 96.4  F (35.8  C)   Resp 18   LMP  (LMP Unknown)   SpO2 98%    Gen: resting in bed, breathing through ctx  Cvx: 5cm per RN at 1420    FHT: 150, mod variability, no accels, variable decels with ctx  [place]: 3-4 contractions in 10 min    Membranes: s/p AROM, clear    A/P: 30 year old  at 40w2d by 7w1d US admitted for elective IOL  FWB: Cat I, reactive. EFW 8 lbs  Labor: on pitocin per protocol, s/p AROM  Pain: per patient request  GBS: negative  Rh: positive  Rubella: immune  Continue routine labor management.   Anticipate     Swati Radnle MD 3:10 PM

## 2021-01-13 NOTE — ANESTHESIA POSTPROCEDURE EVALUATION
Patient: Tasia Melgar    * No procedures listed *    Diagnosis:* No pre-op diagnosis entered *  Diagnosis Additional Information: No value filed.    Anesthesia Type:  Spinal    Note:  Anesthesia Post Evaluation    Patient location during evaluation: Bedside  Patient participation: Able to fully participate in evaluation  Level of consciousness: awake and alert  Pain management: adequate  Airway patency: patent  Cardiovascular status: acceptable  Respiratory status: acceptable  Hydration status: acceptable  PONV: none     Anesthetic complications: None          Last vitals:  Vitals:    01/13/21 1117 01/13/21 1242 01/13/21 1425   BP: 122/77 111/79 111/68   Pulse:      Resp:      Temp:  96.4  F (35.8  C) 97.2  F (36.2  C)   SpO2:            Electronically Signed By: GO Lazo CRNA  January 13, 2021  5:06 PM

## 2021-01-13 NOTE — H&P
[facility] Labor and Delivery History and Physical    Tasia Rivero MRN# [id number]   Age: 30 year old YOB: 1990     Date of Admission:  2021    Primary care provider: Ana Sanches           Chief Complaint:   Tasia Rivero is a 30 year old  at 40w2d by 7w1d US admitted for elective IOL.           Pregnancy history:     OBSTETRIC HISTORY:    OB History    Para Term  AB Living   2 1 1 0 0 1   SAB TAB Ectopic Multiple Live Births   0 0 0 0 1      # Outcome Date GA Lbr Paul/2nd Weight Sex Delivery Anes PTL Lv   2 Current            1 Term 18 40w3d 06:36 / 00:22 3.308 kg (7 lb 4.7 oz) F Vag-Spont [facility]      Name: YANIRA RIVERO      Apgar1: 9  Apgar5: 9       EDC: Estimated Date of Delivery: 2021    Prenatal Labs:   Lab Results   Component Value Date    ABO AB 2021    RH Pos 2021    AS Neg 2021    HEPBANG Nonreactive 2020    HGB 9.8 (L) 2021       GBS Status: negative    Active Problem List  Patient Active Problem List   Diagnosis     Abnormal Pap smear of cervix     Contraceptive management     Encounter for supervision of normal first pregnancy in second trimester     Nausea/vomiting in pregnancy     Scoliosis (and kyphoscoliosis), idiopathic     Encounter for triage in pregnant patient     Normal labor and delivery     Term pregnancy delivered     Encounter for supervision of other normal pregnancy, third trimester       Medication Prior to Admission  Medications Prior to Admission   Medication Sig Dispense Refill Last Dose     famotidine (PEPCID) 20 MG tablet Take 1 tablet (20 mg) by mouth 2 times daily 90 tablet 2      Prenatal Vit-Fe Fumarate-FA (PRENATAL MULTIVITAMIN W/IRON) 27-0.8 MG tablet Take 1 tablet by mouth daily        promethazine (PHENERGAN) 25 MG tablet Take 1 tablet (25 mg) by mouth every 6 hours as needed for nausea 20 tablet 1    .        Maternal Past Medical  History:     Past Medical History:   Diagnosis Date     No pertinent past medical history      Past Surgical History:   Procedure Laterality Date     OTHER SURGICAL HISTORY      [id number],NO PAST SURGERIES                       Family History:     Family History   Problem Relation Age of Onset     Hypertension Father         Hypertension     Myocardial Infarction Father 50           Social History:     Social History     Tobacco Use     Smoking status: Never Smoker     Smokeless tobacco: Never Used   Substance Use Topics     Alcohol use: No     Alcohol/week: 0.0 standard drinks            Review of Systems:   The Review of Systems is negative other than noted in the HPI          Physical Exam:     Patient Vitals for the past 8 hrs:   BP Temp Temp src Pulse Resp SpO2   21 0745 127/76 96.4  F (35.8  C) Temporal 82 18 --   21 0547 129/77 96.6  F (35.9  C) Temporal 106 18 98 %     Gen: resting in bed, NAD  CV: RRR  Resp: CTAB  Abd: gravid, soft, nontender  Ext: nontender    Cervix: 2-3 cm upon admission per RN exam  Membranes: intact  EFW: 8 lbs  Presentation:Cephalic    Fetal Heart Rate Tracin, mod variability, + accels, no decels  [place]ocometer: 3 ctx in 10 min        Assessment:   Tasia Melgar is a 30 year old  at 40w2d by 7w1d US admitted for elective IOL          Plan:   FWB: Cat I, reactive. EFW 8 lbs  Labor: on pitocin per protocol, will AROM later this morning  Pain: per patient request  Rh positive, RI, GBS negative  Anticipate     Swati Randle MD

## 2021-01-13 NOTE — PROGRESS NOTES
Intrapartum Progress Note    S: Patient is still having back pain despite ITN.     O: /72   Pulse 78   Temp 97.2  F (36.2  C)   Resp 18   LMP  (LMP Unknown)   SpO2 99%    Gen: appears uncomfortable with ctx, resting between  Cvx 7/90/-2    FHT: 150, mod variability, no accels, early and variable decels with ctx  [place]: 4 contractions in 10 min    Membranes: s/p AROM    A/P: 30 year old  at 40w2d by 7w1d US admitted for elective IOL  FWB: Cat II with  Moderate variability, overall reassuring. EFW 8 lbs  Labor: on pitocin per protocol, s/p AROM. Suspect OP, will try repositioning  Pain: s/p ITN  GBS: negative  Rh: positive  Rubella: immune  Continue routine labor management.   Anticipate     Swati Randle MD 5:16 PM

## 2021-01-13 NOTE — PROGRESS NOTES
Pt arrived from home with spouse for scheduled induction of labor. Placed on EFM, category 1 tracing. Accelerations present, baby is active. Pt reports maia overnight, currently maia every 3 minutes. Pt reports minimal pain with contractions. Vital signs stable.   Brenna Nolasco RN on 1/13/2021 at 6:38 AM

## 2021-01-13 NOTE — PROGRESS NOTES
Intrapartum Progress Note  Late entry from 1000    S: Patient is feeling more ctx.     O: BP 98/56   Pulse 78   Temp 97.3  F (36.3  C) (Temporal)   Resp 18   LMP  (LMP Unknown)   SpO2 98%    Gen: resting in bed, NAD  Cvx: 3/50/-2    FHT: 150, mod variability, + accels, no decels  [place]: 3 contractions in 10 min    Membranes: AROM- clear    A/P: 30 year old  at 40w2d by 7w1d US admitted for elective IOL  FWB: Cat I, reactive. EFW 8 lbs  Labor: on pitocin per protocol, now s/p AROM  Pain: per patient request  GBS: negative  Rh: positive  Rubella: immune  Continue routine labor management.   Anticipate     Swati Randle MD 11:45 AM

## 2021-01-13 NOTE — ANESTHESIA PREPROCEDURE EVALUATION
Anesthesia Pre-Procedure Evaluation    Patient: Tasia Melgar   MRN: [id number] : 1990          Preoperative Diagnosis: * No pre-op diagnosis entered *    * No procedures listed *    Past Medical History:   Diagnosis Date     No pertinent past medical history      Past Surgical History:   Procedure Laterality Date     OTHER SURGICAL HISTORY      [id number],NO PAST SURGERIES       Anesthesia Evaluation     . Pt has not had prior anesthetic     No history of anesthetic complications          ROS/MED HX    ENT/Pulmonary:  - neg pulmonary ROS     Neurologic:  - neg neurologic ROS     Cardiovascular:  - neg cardiovascular ROS       METS/Exercise Tolerance:  >4 METS   Hematologic:  - neg hematologic  ROS       Musculoskeletal:   (+)  other musculoskeletal- scoliosis      GI/Hepatic:     (+) GERD Asymptomatic on medication,       Renal/Genitourinary:  - ROS Renal section negative       Endo:  - neg endo ROS       Psychiatric:  - neg psychiatric ROS       Infectious Disease:  - neg infectious disease ROS       Malignancy:      - no malignancy   Other:    (+) Possibly pregnant C-spine cleared: N/A, no H/O Chronic Pain,no other significant disability                    neg OB ROS            Physical Exam  Normal systems: cardiovascular, pulmonary and dental    Airway   Mallampati: II  TM distance: > 3 FB  Neck ROM: full  Mouth opening: > 3 cm    Dental     Cardiovascular       Pulmonary             Lab Results   Component Value Date    WBC 11.2 (H) 2021    HGB 9.8 (L) 2021    HCT 34.0 (L) 2021     2021     2017    POTASSIUM 3.5 2017    CHLORIDE 104 2017    CO2 21 2017    BUN 6 (L) 2017    CR 0.63 (L) 2017    GLC 76 2017    CASSIE 9.3 2017    MAG 1.9 2017    HCG Positive (Pos) 2017       Preop Vitals  BP Readings from Last 3 Encounters:   21 111/68   21 110/70   20 112/60    Pulse Readings from Last 3  "Encounters:   01/13/21 78   01/06/21 88   12/29/20 116      Resp Readings from Last 3 Encounters:   01/13/21 18   11/11/20 12   09/09/19 20    SpO2 Readings from Last 3 Encounters:   01/13/21 98%   12/15/20 100%   07/21/20 99%      Temp Readings from Last 1 Encounters:   01/13/21 97.2  F (36.2  C)    Ht Readings from Last 1 Encounters:   05/26/20 1.676 m (5' 6\")      Wt Readings from Last 1 Encounters:   01/06/21 107 kg (236 lb)    Estimated body mass index is 38.09 kg/m  as calculated from the following:    Height as of 5/26/20: 1.676 m (5' 6\").    Weight as of 1/6/21: 107 kg (236 lb).       Anesthesia Plan      History & Physical Review      ASA Status:  2 .  OB Epidural Asa: 2   NPO Status:  > 2 hours    Plan for Spinal     Risks, benefits and alternatives discussed and would like to proceed with ITN injection.           Postoperative Care      Consents  Anesthetic plan, risks, benefits and alternatives discussed with:  Patient..                 GO Lazo CRNA  "

## 2021-01-14 ENCOUNTER — ANESTHESIA (OUTPATIENT)
Dept: SURGERY | Facility: OTHER | Age: 31
End: 2021-01-14
Payer: MEDICAID

## 2021-01-14 ENCOUNTER — ANESTHESIA EVENT (OUTPATIENT)
Dept: SURGERY | Facility: OTHER | Age: 31
End: 2021-01-14

## 2021-01-14 PROBLEM — Z30.2 ENCOUNTER FOR STERILIZATION: Status: ACTIVE | Noted: 2021-01-12

## 2021-01-14 LAB
HGB BLD-MCNC: 8.5 G/DL (ref 11.7–15.7)
LABORATORY COMMENT REPORT: NORMAL
SARS-COV-2 RNA RESP QL NAA+PROBE: NEGATIVE
SPECIMEN SOURCE: NORMAL

## 2021-01-14 PROCEDURE — 250N000013 HC RX MED GY IP 250 OP 250 PS 637: Performed by: OBSTETRICS & GYNECOLOGY

## 2021-01-14 PROCEDURE — 0UB70ZZ EXCISION OF BILATERAL FALLOPIAN TUBES, OPEN APPROACH: ICD-10-PCS | Performed by: OBSTETRICS & GYNECOLOGY

## 2021-01-14 PROCEDURE — 370N000017 HC ANESTHESIA TECHNICAL FEE, PER MIN: Performed by: OBSTETRICS & GYNECOLOGY

## 2021-01-14 PROCEDURE — 360N000075 HC SURGERY LEVEL 2, PER MIN: Performed by: OBSTETRICS & GYNECOLOGY

## 2021-01-14 PROCEDURE — 58605 DIVISION OF FALLOPIAN TUBE: CPT | Performed by: NURSE ANESTHETIST, CERTIFIED REGISTERED

## 2021-01-14 PROCEDURE — 85018 HEMOGLOBIN: CPT | Performed by: OBSTETRICS & GYNECOLOGY

## 2021-01-14 PROCEDURE — 88302 TISSUE EXAM BY PATHOLOGIST: CPT

## 2021-01-14 PROCEDURE — 58605 DIVISION OF FALLOPIAN TUBE: CPT | Performed by: OBSTETRICS & GYNECOLOGY

## 2021-01-14 PROCEDURE — 710N000010 HC RECOVERY PHASE 1, LEVEL 2, PER MIN: Performed by: OBSTETRICS & GYNECOLOGY

## 2021-01-14 PROCEDURE — 250N000009 HC RX 250: Performed by: NURSE ANESTHETIST, CERTIFIED REGISTERED

## 2021-01-14 PROCEDURE — 258N000003 HC RX IP 258 OP 636: Performed by: OBSTETRICS & GYNECOLOGY

## 2021-01-14 PROCEDURE — 250N000011 HC RX IP 250 OP 636: Performed by: NURSE ANESTHETIST, CERTIFIED REGISTERED

## 2021-01-14 PROCEDURE — 36415 COLL VENOUS BLD VENIPUNCTURE: CPT | Performed by: OBSTETRICS & GYNECOLOGY

## 2021-01-14 PROCEDURE — 272N000001 HC OR GENERAL SUPPLY STERILE: Performed by: OBSTETRICS & GYNECOLOGY

## 2021-01-14 PROCEDURE — 120N000001 HC R&B MED SURG/OB

## 2021-01-14 PROCEDURE — 250N000011 HC RX IP 250 OP 636: Performed by: OBSTETRICS & GYNECOLOGY

## 2021-01-14 RX ORDER — BUPIVACAINE HYDROCHLORIDE 2.5 MG/ML
INJECTION, SOLUTION INFILTRATION; PERINEURAL PRN
Status: DISCONTINUED | OUTPATIENT
Start: 2021-01-14 | End: 2021-01-14 | Stop reason: HOSPADM

## 2021-01-14 RX ORDER — NALOXONE HYDROCHLORIDE 0.4 MG/ML
0.2 INJECTION, SOLUTION INTRAMUSCULAR; INTRAVENOUS; SUBCUTANEOUS
Status: DISCONTINUED | OUTPATIENT
Start: 2021-01-14 | End: 2021-01-14

## 2021-01-14 RX ORDER — LIDOCAINE HYDROCHLORIDE 20 MG/ML
INJECTION, SOLUTION INFILTRATION; PERINEURAL PRN
Status: DISCONTINUED | OUTPATIENT
Start: 2021-01-14 | End: 2021-01-14

## 2021-01-14 RX ORDER — GLYCOPYRROLATE 0.2 MG/ML
INJECTION, SOLUTION INTRAMUSCULAR; INTRAVENOUS PRN
Status: DISCONTINUED | OUTPATIENT
Start: 2021-01-14 | End: 2021-01-14

## 2021-01-14 RX ORDER — NALOXONE HYDROCHLORIDE 0.4 MG/ML
0.4 INJECTION, SOLUTION INTRAMUSCULAR; INTRAVENOUS; SUBCUTANEOUS
Status: DISCONTINUED | OUTPATIENT
Start: 2021-01-14 | End: 2021-01-14

## 2021-01-14 RX ORDER — FENTANYL CITRATE 50 UG/ML
25-50 INJECTION, SOLUTION INTRAMUSCULAR; INTRAVENOUS
Status: DISCONTINUED | OUTPATIENT
Start: 2021-01-14 | End: 2021-01-14 | Stop reason: HOSPADM

## 2021-01-14 RX ORDER — SIMETHICONE 80 MG
80 TABLET,CHEWABLE ORAL 4 TIMES DAILY PRN
Status: DISCONTINUED | OUTPATIENT
Start: 2021-01-14 | End: 2021-01-15 | Stop reason: HOSPADM

## 2021-01-14 RX ORDER — HYDROMORPHONE HYDROCHLORIDE 1 MG/ML
.3-.5 INJECTION, SOLUTION INTRAMUSCULAR; INTRAVENOUS; SUBCUTANEOUS EVERY 5 MIN PRN
Status: DISCONTINUED | OUTPATIENT
Start: 2021-01-14 | End: 2021-01-14 | Stop reason: HOSPADM

## 2021-01-14 RX ORDER — PROPOFOL 10 MG/ML
INJECTION, EMULSION INTRAVENOUS PRN
Status: DISCONTINUED | OUTPATIENT
Start: 2021-01-14 | End: 2021-01-14

## 2021-01-14 RX ORDER — NALOXONE HYDROCHLORIDE 0.4 MG/ML
0.2 INJECTION, SOLUTION INTRAMUSCULAR; INTRAVENOUS; SUBCUTANEOUS
Status: DISCONTINUED | OUTPATIENT
Start: 2021-01-14 | End: 2021-01-15 | Stop reason: HOSPADM

## 2021-01-14 RX ORDER — SODIUM CHLORIDE, SODIUM LACTATE, POTASSIUM CHLORIDE, CALCIUM CHLORIDE 600; 310; 30; 20 MG/100ML; MG/100ML; MG/100ML; MG/100ML
INJECTION, SOLUTION INTRAVENOUS CONTINUOUS
Status: DISCONTINUED | OUTPATIENT
Start: 2021-01-14 | End: 2021-01-14 | Stop reason: HOSPADM

## 2021-01-14 RX ORDER — ONDANSETRON 2 MG/ML
INJECTION INTRAMUSCULAR; INTRAVENOUS PRN
Status: DISCONTINUED | OUTPATIENT
Start: 2021-01-14 | End: 2021-01-14

## 2021-01-14 RX ORDER — NALOXONE HYDROCHLORIDE 0.4 MG/ML
0.4 INJECTION, SOLUTION INTRAMUSCULAR; INTRAVENOUS; SUBCUTANEOUS
Status: DISCONTINUED | OUTPATIENT
Start: 2021-01-14 | End: 2021-01-15 | Stop reason: HOSPADM

## 2021-01-14 RX ORDER — SODIUM CHLORIDE, SODIUM LACTATE, POTASSIUM CHLORIDE, CALCIUM CHLORIDE 600; 310; 30; 20 MG/100ML; MG/100ML; MG/100ML; MG/100ML
INJECTION, SOLUTION INTRAVENOUS CONTINUOUS
Status: DISCONTINUED | OUTPATIENT
Start: 2021-01-14 | End: 2021-01-15 | Stop reason: HOSPADM

## 2021-01-14 RX ORDER — ONDANSETRON 4 MG/1
4 TABLET, ORALLY DISINTEGRATING ORAL EVERY 30 MIN PRN
Status: DISCONTINUED | OUTPATIENT
Start: 2021-01-14 | End: 2021-01-14 | Stop reason: HOSPADM

## 2021-01-14 RX ORDER — PROPOFOL 10 MG/ML
INJECTION, EMULSION INTRAVENOUS CONTINUOUS PRN
Status: DISCONTINUED | OUTPATIENT
Start: 2021-01-14 | End: 2021-01-14

## 2021-01-14 RX ORDER — OXYCODONE AND ACETAMINOPHEN 5; 325 MG/1; MG/1
1-2 TABLET ORAL EVERY 4 HOURS PRN
Status: DISCONTINUED | OUTPATIENT
Start: 2021-01-14 | End: 2021-01-15 | Stop reason: HOSPADM

## 2021-01-14 RX ORDER — DEXAMETHASONE SODIUM PHOSPHATE 4 MG/ML
INJECTION, SOLUTION INTRA-ARTICULAR; INTRALESIONAL; INTRAMUSCULAR; INTRAVENOUS; SOFT TISSUE PRN
Status: DISCONTINUED | OUTPATIENT
Start: 2021-01-14 | End: 2021-01-14

## 2021-01-14 RX ORDER — ONDANSETRON 2 MG/ML
4 INJECTION INTRAMUSCULAR; INTRAVENOUS EVERY 30 MIN PRN
Status: DISCONTINUED | OUTPATIENT
Start: 2021-01-14 | End: 2021-01-14 | Stop reason: HOSPADM

## 2021-01-14 RX ORDER — NEOSTIGMINE METHYLSULFATE 1 MG/ML
VIAL (ML) INJECTION PRN
Status: DISCONTINUED | OUTPATIENT
Start: 2021-01-14 | End: 2021-01-14

## 2021-01-14 RX ORDER — FENTANYL CITRATE 50 UG/ML
INJECTION, SOLUTION INTRAMUSCULAR; INTRAVENOUS PRN
Status: DISCONTINUED | OUTPATIENT
Start: 2021-01-14 | End: 2021-01-14

## 2021-01-14 RX ORDER — KETOROLAC TROMETHAMINE 30 MG/ML
INJECTION, SOLUTION INTRAMUSCULAR; INTRAVENOUS PRN
Status: DISCONTINUED | OUTPATIENT
Start: 2021-01-14 | End: 2021-01-14

## 2021-01-14 RX ADMIN — PROPOFOL 200 MCG/KG/MIN: 10 INJECTION, EMULSION INTRAVENOUS at 10:42

## 2021-01-14 RX ADMIN — IBUPROFEN 800 MG: 400 TABLET, FILM COATED ORAL at 15:24

## 2021-01-14 RX ADMIN — DOCUSATE SODIUM 50 MG AND SENNOSIDES 8.6 MG 2 TABLET: 8.6; 5 TABLET, FILM COATED ORAL at 21:19

## 2021-01-14 RX ADMIN — FENTANYL CITRATE 50 MCG: 50 INJECTION, SOLUTION INTRAMUSCULAR; INTRAVENOUS at 11:35

## 2021-01-14 RX ADMIN — ONDANSETRON 4 MG: 2 INJECTION INTRAMUSCULAR; INTRAVENOUS at 10:41

## 2021-01-14 RX ADMIN — FENTANYL CITRATE 50 MCG: 50 INJECTION, SOLUTION INTRAMUSCULAR; INTRAVENOUS at 11:20

## 2021-01-14 RX ADMIN — FENTANYL CITRATE 50 MCG: 50 INJECTION, SOLUTION INTRAMUSCULAR; INTRAVENOUS at 11:14

## 2021-01-14 RX ADMIN — SODIUM CHLORIDE, POTASSIUM CHLORIDE, SODIUM LACTATE AND CALCIUM CHLORIDE 1000 ML: 600; 310; 30; 20 INJECTION, SOLUTION INTRAVENOUS at 09:50

## 2021-01-14 RX ADMIN — LIDOCAINE HYDROCHLORIDE 80 MG: 20 INJECTION, SOLUTION INFILTRATION; PERINEURAL at 10:41

## 2021-01-14 RX ADMIN — FENTANYL CITRATE 50 MCG: 50 INJECTION, SOLUTION INTRAMUSCULAR; INTRAVENOUS at 11:43

## 2021-01-14 RX ADMIN — SODIUM CITRATE AND CITRIC ACID MONOHYDRATE 30 ML: 500; 334 SOLUTION ORAL at 09:51

## 2021-01-14 RX ADMIN — OXYCODONE HYDROCHLORIDE AND ACETAMINOPHEN 2 TABLET: 5; 325 TABLET ORAL at 13:22

## 2021-01-14 RX ADMIN — FENTANYL CITRATE 50 MCG: 50 INJECTION, SOLUTION INTRAMUSCULAR; INTRAVENOUS at 10:41

## 2021-01-14 RX ADMIN — ROCURONIUM BROMIDE 50 MG: 10 INJECTION INTRAVENOUS at 10:41

## 2021-01-14 RX ADMIN — PROPOFOL 200 MG: 10 INJECTION, EMULSION INTRAVENOUS at 10:41

## 2021-01-14 RX ADMIN — KETOROLAC TROMETHAMINE 30 MG: 30 INJECTION, SOLUTION INTRAMUSCULAR at 11:10

## 2021-01-14 RX ADMIN — PROPOFOL 40 MG: 10 INJECTION, EMULSION INTRAVENOUS at 11:15

## 2021-01-14 RX ADMIN — NEOSTIGMINE METHYLSULFATE 4 MG: 1 INJECTION INTRAVENOUS at 11:14

## 2021-01-14 RX ADMIN — GLYCOPYRROLATE 0.6 MG: 0.2 INJECTION, SOLUTION INTRAMUSCULAR; INTRAVENOUS at 11:13

## 2021-01-14 RX ADMIN — OXYCODONE HYDROCHLORIDE AND ACETAMINOPHEN 2 TABLET: 5; 325 TABLET ORAL at 18:10

## 2021-01-14 RX ADMIN — IBUPROFEN 800 MG: 400 TABLET, FILM COATED ORAL at 21:18

## 2021-01-14 RX ADMIN — DEXAMETHASONE SODIUM PHOSPHATE 8 MG: 4 INJECTION, SOLUTION INTRA-ARTICULAR; INTRALESIONAL; INTRAMUSCULAR; INTRAVENOUS; SOFT TISSUE at 10:54

## 2021-01-14 RX ADMIN — SODIUM CHLORIDE, POTASSIUM CHLORIDE, SODIUM LACTATE AND CALCIUM CHLORIDE: 600; 310; 30; 20 INJECTION, SOLUTION INTRAVENOUS at 01:47

## 2021-01-14 RX ADMIN — SIMETHICONE 80 MG: 80 TABLET, CHEWABLE ORAL at 21:19

## 2021-01-14 NOTE — PLAN OF CARE
Pt up to bathroom with assist of two. Pt unable to void, bladder scanned for 704mL, straight cath for 925mL.      Pt having minimal pain, ibuprofen given. Fundus firm and at umbilicus. Pt having moderate bleeding, no clots noted. Vital signs stable.  Brenna Nolasco RN on 1/13/2021 at 9:14 PM

## 2021-01-14 NOTE — PROGRESS NOTES
Patient to room 405 from PACU via bed. Resting in bed, VSS, Umbilical incision CDI. Will continue to monitor.

## 2021-01-14 NOTE — L&D DELIVERY NOTE
OB Vaginal Delivery Note    Tasia Melgar MRN# [id number]   Age: 30 year old YOB: 1990       GA: 40w2d  GP:   Labor Complications: None   EBL: 50  mL  Delivery QBL:    Delivery Type: Vaginal, Spontaneous   ROM to Delivery Time: (Delivered) Hours: 8 Minutes: 17   Weight:     1 Minute 5 Minute 10 Minute   Apgar Totals:                 Delivery Details:  Tasia Melgar, a 30 year old  who was admitted at 40w2d for elective IOL. She received pitocin and underwent AROM. She progressed normally through labor to complete dilation. During active labor, she developed recurrent variable and early decelerations that responded to position changes and oxygen. She pushed effectively and delivered a viable female infant, LIZZY position, without complication. Apgars 8/9. Weight 8lb 4.3oz. Placenta delivered spontaneously and appeared intact. First degree perineal laceration, skin abrasion on external perineum and bilateral periurethral lacerations all hemostatic and not repaired. EBL 50     Swati Randle MD  OB/GYN  2021 6:43 PM         Nakia Melgar-Tasia [[id number]]    Rupture date/time: 21 1009   Rupture type: Artificial Rupture of Membranes  Fluid color: Clear, Pink  Fluid odor: Normal     Delivery/Placenta Date and Time    Delivery Date: 21 Delivery Time:  6:26 PM      Labor Events and Shoulder Dystocia    Fetal Tracing Prior to Delivery: Category 2  Shoulder dystocia present?: Neg     Delivery (Maternal) (Provider to Complete) ([id number])    Episiotomy: None  Perineal lacerations: 1st Repaired?: No   Periurethral laceration: bilateral Repaired?: No   Est. blood loss (mL): 50     Blood Loss  Mother: Tasia Melgar #[id number]   Start of Mother's Information    IO Blood Loss  21 0626 - 21 1841    EBL (mL) Hospital Encounter 50 mL    Total  50 mL         End of Mother's Information  Mother: Tasia Melgar #[id number]          Delivery - Provider to  Complete ([id number])    Delivering clinician: Swati Randle MD  Delivery Type (Choose the 1 that will go to the Birth History): Vaginal, Spontaneous                                 Placenta    Delayed Cord Clamping: Done  Removal: Spontaneous  Disposition: Hospital disposal           Presentation and Position    Presentation: Vertex    Position: Right Occiput Anterior                 Swati Randle MD

## 2021-01-14 NOTE — PROGRESS NOTES
Assessment done, VSS. See flowsheet for further info. Patient is NPO and prepped for surgery, IV infusing. She offers no complaints at this time. Will continue to monitor.

## 2021-01-14 NOTE — PROGRESS NOTES
OB/GYN Postpartum Note      S:  Patient is feeling well this morning.  Lochia = menses.  Eating and drinking without nausea.  Ambulating without difficulty.  Pain is well controlled. Bottle feeding.    O:   Vitals:    21 2201 21 2332 21 0750 21 0751   BP: 112/61 117/59 117/65    Pulse: 78 80 83    Resp: 16 16 16    Temp: 96.8  F (36  C) 97.5  F (36.4  C) 97.2  F (36.2  C) 97.2  F (36.2  C)   TempSrc: Temporal Temporal Temporal    SpO2: 98% 98% 100%      General: resting in bed, in NAD  Resp: nonlabored  Abdomen: soft, nontender, nondistended  Fundus firm at umbilicus  Extremities: nontender    Hemoglobin   Date Value Ref Range Status   2021 9.8 (L) 11.7 - 15.7 g/dL Final   ]    A: Ms. Tasia Melgar is a 30 year old  PPD #1 s/p     P:  Heme 9.8 > EBL 50 > AM pending  GI: tolerating regular diet  Feeding: bottle  Contraception: desires sterilization- discussed risks and benefits in detail. Patient desires to proceed. Will be completed this morning  Rubella Immune  Rh positive  Disposition: routine PP cares, anticipate discharge tomorrow    Swati Randle MD  OB/GYN  2021 9:29 AM

## 2021-01-14 NOTE — OR NURSING
PACU Transfer Note    Tasia Melgar was transferred to [place] [id number] via bed.  Equipment used for transport:  bed.  Accompanied by:  RN  Prescriptions were: none    PACU Respiratory Event Documentation     1) Episodes of Apnea greater than or equal to 10 seconds: no    2) Bradypnea - less than 8 breaths per minute: no    3) Pain score on 0 to 10 scale: 3-4/10 patient states this is tolerable    4) Pain-sedation mismatch (yes or no): no    5) Repeated 02 desaturation less than 90% (yes or no): no    Anesthesia notified? (yes or no): no    Any of the above events occuring repeatedly in separate 30 minute intervals may be considered recurrent PACU respiratory events.    Patient stable and meets phase 1 discharge criteria for transport from PACU.      Krupa Nice RN

## 2021-01-14 NOTE — ANESTHESIA POSTPROCEDURE EVALUATION
Patient: Tasia Melgar    Procedure(s):  LIGATION, FALLOPIAN TUBE, POSTPARTUM    Diagnosis:Encounter for sterilization [Z30.2]  Diagnosis Additional Information: No value filed.    Anesthesia Type:  General    Note:  Anesthesia Post Evaluation    Patient location during evaluation: PACU  Patient participation: Able to fully participate in evaluation  Level of consciousness: awake and alert  Pain management: adequate  Airway patency: patent  Respiratory status: acceptable  Hydration status: acceptable  PONV: none     Anesthetic complications: None          Last vitals:  Vitals:    01/14/21 1215 01/14/21 1229 01/14/21 1245   BP: 117/74 113/70 108/66   Pulse:      Resp:      Temp:   96.9  F (36.1  C)   SpO2:            Electronically Signed By: GO REDDY CRNA  January 14, 2021  1:08 PM

## 2021-01-14 NOTE — ANESTHESIA PREPROCEDURE EVALUATION
Anesthesia Pre-Procedure Evaluation    Patient: Tasia Melgar   MRN: [id number] : 1990          Preoperative Diagnosis: Encounter for sterilization [Z30.2]    Procedure(s):  LIGATION, FALLOPIAN TUBE, POSTPARTUM    Past Medical History:   Diagnosis Date     No pertinent past medical history      Past Surgical History:   Procedure Laterality Date     OTHER SURGICAL HISTORY      [id number],NO PAST SURGERIES       Anesthesia Evaluation     . Pt has not had prior anesthetic            ROS/MED HX    ENT/Pulmonary:  - neg pulmonary ROS     Neurologic:  - neg neurologic ROS     Cardiovascular:  - neg cardiovascular ROS       METS/Exercise Tolerance:  >4 METS   Hematologic:  - neg hematologic  ROS       Musculoskeletal:   (+)  other musculoskeletal- scoliosis      GI/Hepatic:     (+) GERD Asymptomatic on medication,       Renal/Genitourinary:  - ROS Renal section negative       Endo:     (+) Obesity, .      Psychiatric:  - neg psychiatric ROS       Infectious Disease:  - neg infectious disease ROS       Malignancy:   (+)   Post Partum day 1     - no malignancy   Other:    (+) No chance of pregnancy   - neg other ROS                      Physical Exam  Normal systems: cardiovascular, pulmonary and dental    Airway   Mallampati: II  TM distance: >3 FB  Neck ROM: full    Dental     Cardiovascular   Rhythm and rate: regular and normal      Pulmonary    breath sounds clear to auscultation            Lab Results   Component Value Date    WBC 11.2 (H) 2021    HGB 9.8 (L) 2021    HCT 34.0 (L) 2021     2021     2017    POTASSIUM 3.5 2017    CHLORIDE 104 2017    CO2 21 2017    BUN 6 (L) 2017    CR 0.63 (L) 2017    GLC 76 2017    CASSIE 9.3 2017    MAG 1.9 2017    HCG Positive (Pos) 2017       Preop Vitals  BP Readings from Last 3 Encounters:   21 117/59   21 110/70   20 112/60    Pulse Readings from Last 3  "Encounters:   01/13/21 80   01/06/21 88   12/29/20 116      Resp Readings from Last 3 Encounters:   01/13/21 16   11/11/20 12   09/09/19 20    SpO2 Readings from Last 3 Encounters:   01/13/21 98%   12/15/20 100%   07/21/20 99%      Temp Readings from Last 1 Encounters:   01/13/21 97.5  F (36.4  C) (Temporal)    Ht Readings from Last 1 Encounters:   05/26/20 1.676 m (5' 6\")      Wt Readings from Last 1 Encounters:   01/06/21 107 kg (236 lb)    Estimated body mass index is 38.09 kg/m  as calculated from the following:    Height as of 5/26/20: 1.676 m (5' 6\").    Weight as of 1/6/21: 107 kg (236 lb).       Anesthesia Plan      History & Physical Review      ASA Status:  2 .    NPO Status:  > 8 hours    Plan for General (GETA) with Intravenous and Propofol induction. Maintenance will be Balanced.      Discussed previous ITN for labor pain.  Pt did not receive good pain control due to her scoliosis.  Discussed how spinal/epidural may not be successful given her history. Recommended GETA to the patient.  She wishes to proceed with this plan.          Postoperative Care      Consents  Anesthetic plan, risks, benefits and alternatives discussed with:  Patient.  Use of blood products discussed: No .   .                 GO rOozco CRNA  "

## 2021-01-14 NOTE — OP NOTE
[facility]  Operative Note    Patient: Tasia Melgar   : 1990   MRN: [id number]     Date of Service: 2021     Pre-operative diagnosis:  1. Post partum day 1 s/p   2. Desire for permanent tubal ligation    Post-operative diagnosis:  1. same    Procedure:   1. Postpartum bilateral salpingectomy    Surgeon: Swati Randle MD     Anesthesia: general    EBL: 5 mL    Specimens: right and left tube  Complications: none    Findings: normal postpartum uterus and fallopian tubes, bilateral telescoping ends at conclusion of tubal ligation    Indications: Tasia Melgar is a 30 year old  female who desires permanent sterilization. Risks, benefits, and alternatives to the procedure were discussed. The patient's questions were answered, understanding confirmed, and the patient signed written informed consent.    Procedure:  The patient was taken to the operating room where spinal anesthesia was administered. She was placed in supine position with arms at the side. The patient was then prepped and draped in the usual sterile fashion.   Attention was then turned to the abdomen where 0.25% marcaine was used to infiltrate the inferior aspect of the umbilicus. A scalpel was used to make a 4 cm horizontal incision inferior to the umbilicus and a Pete used to expand the incision. The fascia was grasped and incised with the mary scissors and extended. The patient was tilted to the left and the right fallopian tube was brought the incision. It was followed out to the fimbria. The avascular portions of the mesosalpinx were divided with cautery and the larger vessels were individually ligated with 3-0 vicryl. The fallopian tube was ligated 1cm from the cornua with 3-0 vicryl. The specimen was excised and sent for pathology.  The ends were noted to be hemostatic. The patient was then tilted to the right and a segment of the left fallopian tube was excised in a similar fashion. The segments were sent to  pathology.   The fascia was closed with running 0 vicryl. The skin incision was closed using 3-0 monocryl. Good hemostasis was noted.   Instrument, sponge, and needle counts were correct times 2. The patient was transferred to the PACU in stable condition.    Swati aRndle MD

## 2021-01-14 NOTE — PROGRESS NOTES
Patient ate crackers and applesauce W/O nausea. She ambulated to the bathroom and voided, tolerated activity well. Will continue to monitor.

## 2021-01-14 NOTE — ANESTHESIA CARE TRANSFER NOTE
Patient: Tasia Melgar    Procedure(s):  LIGATION, FALLOPIAN TUBE, POSTPARTUM    Diagnosis: Encounter for sterilization [Z30.2]  Diagnosis Additional Information: No value filed.    Anesthesia Type:   General     Note:  Airway :Face Mask  Patient transferred to:PACU  Handoff Report: Identifed the Patient, Identified the Reponsible Provider, Reviewed the pertinent medical history, Discussed the surgical course, Reviewed Intra-OP anesthesia mangement and issues during anesthesia, Set expectations for post-procedure period and Allowed opportunity for questions and acknowledgement of understanding      Vitals: (Last set prior to Anesthesia Care Transfer)    CRNA VITALS  1/14/2021 1057 - 1/14/2021 1129      1/14/2021             Resp Rate (set):  10                Electronically Signed By: GO REDDY CRNA  January 14, 2021  11:29 AM

## 2021-01-14 NOTE — ANESTHESIA PROCEDURE NOTES
Airway   Date/Time: 1/14/2021 10:44 AM   Patient location during procedure: OR    Staff -   CRNA: Isauro Salinas APRN CRNA  Performed By: CRNA    Indications and Patient Condition  Indications for airway management: monico-procedural  Induction type:RSIMask difficulty assessment: 0 - not attempted    Final Airway Details  Final airway type: endotracheal airway  Successful airway:ETT - single  Endotracheal Airway Details   ETT size (mm): 7.0  Cuffed: yes  Successful intubation technique: direct laryngoscopy  Grade View of Cords: 1  Adjucts: stylet  Measured from: gums/teeth  Secured with: silk tape  Bite block used: None    Post intubation assessment   Placement verified by: capnometry   Number of attempts at approach: 1  Secured with:silk tape  Ease of procedure: easy  Dentition: Intact

## 2021-01-14 NOTE — PROGRESS NOTES
of a living female, Apgars 8 & 9. Lusty cry, lungs clearing. Mom and Dad pleased with new baby. Fundus firm, moderate flow, no clots noted.

## 2021-01-14 NOTE — PLAN OF CARE
Pt reporting minimal pain, declined pain medication. Fundus remains firm, at umbilicus. Bleeding minimal, no clots. Pt up independently in room, soaked in tub. Pt voided independently x2. Pt NPO for planned tubal. Covid test completed. Pt bottle feeding infant q2-3 hrs.   Brenna Nolasco RN on 1/14/2021 at 4:35 AM

## 2021-01-15 VITALS
RESPIRATION RATE: 18 BRPM | DIASTOLIC BLOOD PRESSURE: 62 MMHG | SYSTOLIC BLOOD PRESSURE: 128 MMHG | OXYGEN SATURATION: 98 % | TEMPERATURE: 96.4 F | HEART RATE: 79 BPM

## 2021-01-15 PROCEDURE — 250N000013 HC RX MED GY IP 250 OP 250 PS 637: Performed by: OBSTETRICS & GYNECOLOGY

## 2021-01-15 RX ORDER — AMOXICILLIN 250 MG
1 CAPSULE ORAL 2 TIMES DAILY
Qty: 100 TABLET | Refills: 0 | Status: SHIPPED | OUTPATIENT
Start: 2021-01-15 | End: 2021-01-29

## 2021-01-15 RX ORDER — FERROUS SULFATE 325(65) MG
325 TABLET ORAL
Qty: 90 TABLET | Refills: 0 | Status: SHIPPED | OUTPATIENT
Start: 2021-01-15 | End: 2021-02-26

## 2021-01-15 RX ORDER — OXYCODONE AND ACETAMINOPHEN 5; 325 MG/1; MG/1
1 TABLET ORAL EVERY 4 HOURS PRN
Qty: 15 TABLET | Refills: 0 | Status: SHIPPED | OUTPATIENT
Start: 2021-01-15 | End: 2021-01-29

## 2021-01-15 RX ADMIN — OXYCODONE HYDROCHLORIDE AND ACETAMINOPHEN 2 TABLET: 5; 325 TABLET ORAL at 06:20

## 2021-01-15 RX ADMIN — DOCUSATE SODIUM 50 MG AND SENNOSIDES 8.6 MG 1 TABLET: 8.6; 5 TABLET, FILM COATED ORAL at 09:59

## 2021-01-15 RX ADMIN — IBUPROFEN 800 MG: 400 TABLET, FILM COATED ORAL at 09:59

## 2021-01-15 RX ADMIN — IBUPROFEN 800 MG: 400 TABLET, FILM COATED ORAL at 03:32

## 2021-01-15 RX ADMIN — OXYCODONE HYDROCHLORIDE AND ACETAMINOPHEN 2 TABLET: 5; 325 TABLET ORAL at 00:14

## 2021-01-15 NOTE — PROGRESS NOTES
OB/GYN Postpartum Note      S:  Patient is feeling well this morning.  Lochia light.  Eating and drinking without nausea.  Ambulating without difficulty.  +Flatus.  Pain is well controlled.   Bottle feeding without questions or concerns.      O:   Vitals:    21 1300 21 1500 21 1942 01/15/21 0042   BP: 133/60 116/55 105/59 109/59   Pulse:  86 82 79   Resp:  18 15 16   Temp:  97.8  F (36.6  C) 97.5  F (36.4  C) 97.5  F (36.4  C)   TempSrc:  Tympanic Temporal Temporal   SpO2:  96% 97% 98%     General: resting in bed, in NAD  Resp: nonlabored  Abdomen: soft, nontender, nondistended  Fundus firm at umbilicus+1  Incision: c/d/i  Extremities: nontender     Hemoglobin   Date Value Ref Range Status   2021 8.5 (L) 11.7 - 15.7 g/dL Final   ]    A: Ms. Tasia Melgar is a 30 year old  PPD #2 s/p , POD#1 s/p BS    P:  GI: tolerating regular diet  Feeding: bottle  Contraception: s/p BS  Rubella Immune  Rh positive  Disposition: routine cares, discharge today    Swati Randle MD  OB/GYN  1/15/2021 8:46 AM

## 2021-01-15 NOTE — PLAN OF CARE
Pt is doing well after her vaginal delivery and tubal surgery. Abdominal incision is dry and approximated. Fundus firm, 1 below umbilicus with light flow and no clots. Pt has had minimal/moderate amounts of pain that are well managed with oral analgesics. Pt is ambulating appropriately in room. Pt has verbalized understanding of routine cares as well as warning signs to be aware of.  Vital signs stable.  Brenan Nolasco RN on 1/15/2021 at 3:59 AM

## 2021-01-15 NOTE — DISCHARGE SUMMARY
VAGINAL DELIVERY DISCHARGE SUMMARY    Admit date: 2021  Discharge date: 1/15/2021     Admit Dx:   - 30 year old  at 40w2d    - Elective IOL  - Desire for sterilization    Discharge Dx:  - Same as above, s/p     Procedures:  - Spontaneous vaginal delivery  - Intrathecal anesthesia  - Postpartum bilateral salpingectomy    Admit HPI:  Ms. Tasia Melgar is a   at 40w2d  who was admitted for elective induction of labor on 2021. Please see her admit H&P for full details of her PMH, PSH, Meds, Allergies and exam on admit.    Hospital course:  Tasia Melgar was admitted to the hospital on 2021 for the above listed indications. Her labor progressed with pitocin and AROM.  She progressed to complete dilation and delivered a female infant in the LIZZY position. APGARS were 8/9. Weight was 3751g. EBL from the delivery was 50. Please see her Delivery Summary for full details regarding her delivery.    Her postpartum course was complicated by asymptomatic acute on chronic iron deficiency anemia. On PPD#1, she underwent an uncomplicated postpartum bilateral salpingectomy.  On PPD#2, she was meeting all of her postpartum goals and deemed stable for discharge. She was voiding without difficulty, tolerating a regular diet without nausea and vomiting, her pain was well controlled on oral pain medicines and her lochia was appropriate. Her hemoglobin prior to delivery was 9.8 and after delivery was 8.5. Her Rh status was positive, and Rhogam was not indicated.     Discharge Medications:  Current Discharge Medication List      START taking these medications    Details   ferrous sulfate (FEROSUL) 325 (65 Fe) MG tablet Take 1 tablet (325 mg) by mouth daily (with breakfast)  Qty: 90 tablet, Refills: 0    Associated Diagnoses: Term pregnancy delivered      oxyCODONE-acetaminophen (PERCOCET) 5-325 MG tablet Take 1 tablet by mouth every 4 hours as needed for moderate to severe pain  Qty: 15 tablet, Refills: 0     Associated Diagnoses: Term pregnancy delivered      senna-docusate (SENOKOT-S/PERICOLACE) 8.6-50 MG tablet Take 1 tablet by mouth 2 times daily  Qty: 100 tablet, Refills: 0    Associated Diagnoses: Term pregnancy delivered         CONTINUE these medications which have NOT CHANGED    Details   famotidine (PEPCID) 20 MG tablet Take 1 tablet (20 mg) by mouth 2 times daily  Qty: 90 tablet, Refills: 2    Associated Diagnoses: Gastroesophageal reflux disease, esophagitis presence not specified      Prenatal Vit-Fe Fumarate-FA (PRENATAL MULTIVITAMIN W/IRON) 27-0.8 MG tablet Take 1 tablet by mouth daily      promethazine (PHENERGAN) 25 MG tablet Take 1 tablet (25 mg) by mouth every 6 hours as needed for nausea  Qty: 20 tablet, Refills: 1    Associated Diagnoses: Nausea and vomiting in pregnancy             Discharge/Disposition:  Tasia Melgar was discharged to home in stable condition with the following instructions/medications:  1) Call for temperature > 100.4, bright red vaginal bleeding >1 pad an hour x 2 hours, foul smelling vaginal discharge, pain not controlled by usual oral pain meds, persistent nausea and vomiting not controlled on medications  2) She was s/p BS for contraception.  3) For feeding she decided to bottle feed.  4) She was instructed to follow-up with Dr FLORESITA Randle in 6 weeks for a routine postpartum visit.      Swati Randle MD  OBGYN  1/15/2021 8:49 AM

## 2021-01-15 NOTE — PLAN OF CARE
Pain controlled. Alternating meds every 3 hours. IV discontinued. Up ad cathleen in room. Abdominal binder on. Umbilical incision intact.Bonding with baby.

## 2021-01-29 ENCOUNTER — OFFICE VISIT (OUTPATIENT)
Dept: OBGYN | Facility: OTHER | Age: 31
End: 2021-01-29
Attending: OBSTETRICS & GYNECOLOGY
Payer: MEDICAID

## 2021-01-29 VITALS
BODY MASS INDEX: 35.99 KG/M2 | WEIGHT: 223 LBS | HEART RATE: 88 BPM | DIASTOLIC BLOOD PRESSURE: 74 MMHG | SYSTOLIC BLOOD PRESSURE: 120 MMHG

## 2021-01-29 DIAGNOSIS — Z98.51 S/P TUBAL LIGATION: Primary | ICD-10-CM

## 2021-01-29 PROCEDURE — 99024 POSTOP FOLLOW-UP VISIT: CPT | Performed by: OBSTETRICS & GYNECOLOGY

## 2021-01-29 ASSESSMENT — PAIN SCALES - GENERAL: PAINLEVEL: NO PAIN (0)

## 2021-01-29 NOTE — PROGRESS NOTES
Postoperative Visit  2021    S: Tasia Melgar is a 30 year old  here for post-operative visit following , postpartum bilateral salpingectom on 21. She reports feeling well. Mood is good. Sleep is going well. Other kids adjusting well. No incisional pain or concerns.     O:   /74 (BP Location: Right arm, Patient Position: Sitting, Cuff Size: Adult Large)   Pulse 88   Wt 101.2 kg (223 lb)   LMP  (LMP Unknown)   Breastfeeding No   BMI 35.99 kg/m    Gen:  Well-appearing, NAD  Abd: soft, nondistended, nontender. Incision c/d/i    A/P:   Ms. Tasia Melgar is a 30 year old  two weeks s/p postpartum bilateral salpingectomy. Doing well, no concerns  RTC 4 weeks for postpartum visit    Swati Randle MD  OB/GYN  2021 10:59 AM

## 2021-01-29 NOTE — NURSING NOTE
Chief Complaint   Patient presents with     Surgical Followup     Tubal 1/14     Offers no complaints.   Yuliana Ha LPN........................1/29/2021  10:49 AM     Medication Reconciliation: completed   Yuliana Ha LPN  1/29/2021 10:49 AM

## 2021-02-26 ENCOUNTER — PRENATAL OFFICE VISIT (OUTPATIENT)
Dept: OBGYN | Facility: OTHER | Age: 31
End: 2021-02-26
Attending: OBSTETRICS & GYNECOLOGY
Payer: MEDICAID

## 2021-02-26 VITALS
DIASTOLIC BLOOD PRESSURE: 72 MMHG | WEIGHT: 234 LBS | HEART RATE: 76 BPM | BODY MASS INDEX: 37.77 KG/M2 | SYSTOLIC BLOOD PRESSURE: 118 MMHG

## 2021-02-26 PROCEDURE — 99207 PR POST-PARTUM 6 WK VISIT - GICH ONLY: CPT | Performed by: OBSTETRICS & GYNECOLOGY

## 2021-02-26 ASSESSMENT — PAIN SCALES - GENERAL: PAINLEVEL: NO PAIN (0)

## 2021-02-26 NOTE — NURSING NOTE
Chief Complaint   Patient presents with     Postpartum Care     6 Week post partum      Offers no complaints   Yuliana Ha LPN........................2/26/2021  1:26 PM     Medication Reconciliation: completed   Yuliana Ha LPN  2/26/2021 1:26 PM

## 2021-02-26 NOTE — PROGRESS NOTES
6 week Postpartum Visit Note    S:  Ms. Tasia Melgar is a 30 year old  here for her 6-week postpartum checkup.   - Had a  on 21.  - Infant gender:  girl, weight 8 pounds 4.3 oz.  - Feeding Method:  Bottlefed.  Complications reported with feeding:  none, infant thriving .    - Bleeding:  None.  Duration:  Stopped after 2 weeks.  Menses resumed:  No  - Bowel/Urinary problems:  No  - Mood: good  - Sleep: good    - Contraception Planned:  S/p sterilization  - She  has had intercourse since delivery and experienced  No discomfort.  .    - Current tobacco use:  No  - Hx of Abuse:  No  ================================================================  ROS: 10 point ROS neg other than the symptoms noted above in the HPI.     O:  /72 (BP Location: Right arm, Patient Position: Sitting, Cuff Size: Adult Large)   Pulse 76   Wt 106.1 kg (234 lb)   LMP  (LMP Unknown)   Breastfeeding No   BMI 37.77 kg/m    Gen: Well-appearing, NAD  Psych:  Appropriate mood and affect  Breast: Deferred, no concerns  Abd:  Benign and Soft, flat, non-tender  Inc:   well healed   Pelvic: Well healed perineum. Normal vagina and cervix. Uterus normal size, retroverted, nontender. No adnexal masses or tenderness.      A/P:  Ms. Tasia Melgar is a 30 year old  here for 6 week postpartum visit after . Doing well.  - Contraception: s/p sterilization  - Feeding: bottle  - Follow-up: annually or sooner arabella Randle MD  OB/GYN  2021 1:50 PM

## 2021-03-12 ENCOUNTER — MEDICAL CORRESPONDENCE (OUTPATIENT)
Dept: HEALTH INFORMATION MANAGEMENT | Facility: OTHER | Age: 31
End: 2021-03-12

## 2021-04-13 ENCOUNTER — ALLIED HEALTH/NURSE VISIT (OUTPATIENT)
Dept: FAMILY MEDICINE | Facility: OTHER | Age: 31
End: 2021-04-13
Attending: FAMILY MEDICINE
Payer: MEDICAID

## 2021-04-13 DIAGNOSIS — R05.9 COUGH: Primary | ICD-10-CM

## 2021-04-13 PROCEDURE — 87635 SARS-COV-2 COVID-19 AMP PRB: CPT | Mod: ZL | Performed by: FAMILY MEDICINE

## 2021-04-13 PROCEDURE — C9803 HOPD COVID-19 SPEC COLLECT: HCPCS

## 2021-04-14 LAB
LABORATORY COMMENT REPORT: NORMAL
SARS-COV-2 RNA RESP QL NAA+PROBE: NEGATIVE
SARS-COV-2 RNA RESP QL NAA+PROBE: NORMAL
SPECIMEN SOURCE: NORMAL
SPECIMEN SOURCE: NORMAL

## 2021-06-18 ENCOUNTER — APPOINTMENT (OUTPATIENT)
Dept: ULTRASOUND IMAGING | Facility: OTHER | Age: 31
End: 2021-06-18
Attending: STUDENT IN AN ORGANIZED HEALTH CARE EDUCATION/TRAINING PROGRAM
Payer: MEDICAID

## 2021-06-18 ENCOUNTER — HOSPITAL ENCOUNTER (EMERGENCY)
Facility: OTHER | Age: 31
Discharge: HOME | End: 2021-06-18
Attending: STUDENT IN AN ORGANIZED HEALTH CARE EDUCATION/TRAINING PROGRAM | Admitting: STUDENT IN AN ORGANIZED HEALTH CARE EDUCATION/TRAINING PROGRAM
Payer: MEDICAID

## 2021-06-18 VITALS
WEIGHT: 210 LBS | HEART RATE: 90 BPM | SYSTOLIC BLOOD PRESSURE: 125 MMHG | OXYGEN SATURATION: 100 % | BODY MASS INDEX: 33.75 KG/M2 | HEIGHT: 66 IN | TEMPERATURE: 97.8 F | DIASTOLIC BLOOD PRESSURE: 84 MMHG | RESPIRATION RATE: 16 BRPM

## 2021-06-18 DIAGNOSIS — N93.9 ABNORMAL UTERINE BLEEDING: ICD-10-CM

## 2021-06-18 LAB
ALBUMIN UR-MCNC: 30 MG/DL
ANION GAP SERPL CALCULATED.3IONS-SCNC: 8 MMOL/L (ref 3–14)
APPEARANCE UR: ABNORMAL
APTT PPP: 30 SEC (ref 22–37)
BASOPHILS # BLD AUTO: 0 10E9/L (ref 0–0.2)
BASOPHILS NFR BLD AUTO: 0.3 %
BILIRUB UR QL STRIP: NEGATIVE
BUN SERPL-MCNC: 13 MG/DL (ref 7–25)
CALCIUM SERPL-MCNC: 9.5 MG/DL (ref 8.6–10.3)
CHLORIDE SERPL-SCNC: 103 MMOL/L (ref 98–107)
CO2 SERPL-SCNC: 25 MMOL/L (ref 21–31)
COLOR UR AUTO: YELLOW
CREAT SERPL-MCNC: 0.83 MG/DL (ref 0.6–1.2)
DIFFERENTIAL METHOD BLD: ABNORMAL
EOSINOPHIL # BLD AUTO: 0.3 10E9/L (ref 0–0.7)
EOSINOPHIL NFR BLD AUTO: 3 %
ERYTHROCYTE [DISTWIDTH] IN BLOOD BY AUTOMATED COUNT: 16.5 % (ref 10–15)
GFR SERPL CREATININE-BSD FRML MDRD: 82 ML/MIN/{1.73_M2}
GLUCOSE SERPL-MCNC: 126 MG/DL (ref 70–105)
GLUCOSE UR STRIP-MCNC: NEGATIVE MG/DL
HCG UR QL: NEGATIVE
HCT VFR BLD AUTO: 36.2 % (ref 35–47)
HGB BLD-MCNC: 11 G/DL (ref 11.7–15.7)
HGB UR QL STRIP: ABNORMAL
IMM GRANULOCYTES # BLD: 0 10E9/L (ref 0–0.4)
IMM GRANULOCYTES NFR BLD: 0.2 %
INR PPP: 1.04 (ref 0.86–1.14)
KETONES UR STRIP-MCNC: NEGATIVE MG/DL
LEUKOCYTE ESTERASE UR QL STRIP: ABNORMAL
LYMPHOCYTES # BLD AUTO: 2.5 10E9/L (ref 0.8–5.3)
LYMPHOCYTES NFR BLD AUTO: 25 %
MCH RBC QN AUTO: 21.5 PG (ref 26.5–33)
MCHC RBC AUTO-ENTMCNC: 30.4 G/DL (ref 31.5–36.5)
MCV RBC AUTO: 71 FL (ref 78–100)
MONOCYTES # BLD AUTO: 0.4 10E9/L (ref 0–1.3)
MONOCYTES NFR BLD AUTO: 3.8 %
NEUTROPHILS # BLD AUTO: 6.8 10E9/L (ref 1.6–8.3)
NEUTROPHILS NFR BLD AUTO: 67.7 %
NITRATE UR QL: NEGATIVE
PH UR STRIP: 5.5 PH (ref 5–7)
PLATELET # BLD AUTO: 356 10E9/L (ref 150–450)
POTASSIUM SERPL-SCNC: 3.9 MMOL/L (ref 3.5–5.1)
PROLACTIN SERPL-MCNC: 10.13 NG/ML
RBC # BLD AUTO: 5.12 10E12/L (ref 3.8–5.2)
RBC #/AREA URNS AUTO: >182 /HPF (ref 0–2)
SODIUM SERPL-SCNC: 136 MMOL/L (ref 134–144)
SOURCE: ABNORMAL
SP GR UR STRIP: 1.02 (ref 1–1.03)
SQUAMOUS #/AREA URNS AUTO: 15 /HPF (ref 0–1)
TSH SERPL DL<=0.05 MIU/L-ACNC: 2.06 IU/ML (ref 0.34–5.6)
UROBILINOGEN UR STRIP-MCNC: NORMAL MG/DL (ref 0–2)
WBC # BLD AUTO: 10 10E9/L (ref 4–11)
WBC #/AREA URNS AUTO: 70 /HPF (ref 0–5)

## 2021-06-18 PROCEDURE — 84146 ASSAY OF PROLACTIN: CPT | Performed by: STUDENT IN AN ORGANIZED HEALTH CARE EDUCATION/TRAINING PROGRAM

## 2021-06-18 PROCEDURE — 85730 THROMBOPLASTIN TIME PARTIAL: CPT | Performed by: STUDENT IN AN ORGANIZED HEALTH CARE EDUCATION/TRAINING PROGRAM

## 2021-06-18 PROCEDURE — 99284 EMERGENCY DEPT VISIT MOD MDM: CPT | Mod: 25 | Performed by: STUDENT IN AN ORGANIZED HEALTH CARE EDUCATION/TRAINING PROGRAM

## 2021-06-18 PROCEDURE — 81025 URINE PREGNANCY TEST: CPT | Performed by: STUDENT IN AN ORGANIZED HEALTH CARE EDUCATION/TRAINING PROGRAM

## 2021-06-18 PROCEDURE — 85610 PROTHROMBIN TIME: CPT | Performed by: STUDENT IN AN ORGANIZED HEALTH CARE EDUCATION/TRAINING PROGRAM

## 2021-06-18 PROCEDURE — 99283 EMERGENCY DEPT VISIT LOW MDM: CPT | Performed by: STUDENT IN AN ORGANIZED HEALTH CARE EDUCATION/TRAINING PROGRAM

## 2021-06-18 PROCEDURE — 84443 ASSAY THYROID STIM HORMONE: CPT | Performed by: STUDENT IN AN ORGANIZED HEALTH CARE EDUCATION/TRAINING PROGRAM

## 2021-06-18 PROCEDURE — 36415 COLL VENOUS BLD VENIPUNCTURE: CPT | Performed by: STUDENT IN AN ORGANIZED HEALTH CARE EDUCATION/TRAINING PROGRAM

## 2021-06-18 PROCEDURE — 258N000003 HC RX IP 258 OP 636: Performed by: STUDENT IN AN ORGANIZED HEALTH CARE EDUCATION/TRAINING PROGRAM

## 2021-06-18 PROCEDURE — 81001 URINALYSIS AUTO W/SCOPE: CPT | Performed by: STUDENT IN AN ORGANIZED HEALTH CARE EDUCATION/TRAINING PROGRAM

## 2021-06-18 PROCEDURE — 96360 HYDRATION IV INFUSION INIT: CPT | Performed by: STUDENT IN AN ORGANIZED HEALTH CARE EDUCATION/TRAINING PROGRAM

## 2021-06-18 PROCEDURE — 80048 BASIC METABOLIC PNL TOTAL CA: CPT | Performed by: STUDENT IN AN ORGANIZED HEALTH CARE EDUCATION/TRAINING PROGRAM

## 2021-06-18 PROCEDURE — 76830 TRANSVAGINAL US NON-OB: CPT | Mod: TC

## 2021-06-18 PROCEDURE — 85025 COMPLETE CBC W/AUTO DIFF WBC: CPT | Performed by: STUDENT IN AN ORGANIZED HEALTH CARE EDUCATION/TRAINING PROGRAM

## 2021-06-18 RX ORDER — TRANEXAMIC ACID 650 MG/1
1300 TABLET ORAL 3 TIMES DAILY
Qty: 30 TABLET | Refills: 0 | Status: SHIPPED | OUTPATIENT
Start: 2021-06-18 | End: 2021-06-21

## 2021-06-18 RX ADMIN — SODIUM CHLORIDE 1000 ML: 9 INJECTION, SOLUTION INTRAVENOUS at 21:23

## 2021-06-18 ASSESSMENT — MIFFLIN-ST. JEOR: SCORE: 1699.3

## 2021-06-19 NOTE — ED PROVIDER NOTES
"  History     Chief Complaint   Patient presents with     Vaginal Bleeding     HPI  Tasia Melgar is a 30 year old woman  (s/p tubal ligation after  2021), otherwise healthy who presents for evaluation of vaginal bleeding.  Patient reports that she had a normal spontaneous vaginal delivery back in 2021, the next day she underwent tubal ligation for sterilization.  She has not had vaginal bleeding or return of menstrual periods until  when she started having vaginal bleeding, fortunately rather than tapering off it has persisted and patient has for several days now been having copious vaginal bleeding including clots, going through 12 pads daily.  She is started to feel lightheaded and developed a mild headache.  She denies any pain associated with this and back states the cramps are very mild when compared to prior vaginal periods.  No prior history of fibroids or other pelvic structure abnormalities per her report.  She has been in touch with her OB/gyn provider and has appointment scheduled for Monday but was instructed to present to the emergency department if symptoms do not improve.  Patient denies any other symptoms including no chest pain or difficulty breathing, no fevers or chills, nausea or vomiting.  She does report a history of an abnormal Pap smear in the past however was told that on follow-up with everything \"looked fine.\"    Allergies:  No Known Allergies    Problem List:    Patient Active Problem List    Diagnosis Date Noted     Encounter for supervision of other normal pregnancy, third trimester 2021     Priority: Medium     Encounter for sterilization 2021     Priority: Medium     Added automatically from request for surgery [id number]       Term pregnancy delivered 2018     Priority: Medium     Encounter for triage in pregnant patient 2018     Priority: Medium     Normal labor and delivery 2018     Priority: Medium     Scoliosis (and " "kyphoscoliosis), idiopathic 02/08/2018     Priority: Medium     Overview:   Convex to right        Abnormal Pap smear of cervix 11/17/2017     Priority: Medium     Overview:   LSIL pap in pregnancy, plan for PP colp       Encounter for supervision of normal first pregnancy in second trimester 08/28/2017     Priority: Medium     Overview:   OB Provider: RUBI  Baby Doc: [facility]  EDC: Estimated Date of Delivery: 2/24/18   Dates based on: (LMP, ?early ultrasound)  Blood Type:  Rubella:  Flu Shot:   Tdap:  Sex of baby;  Circ planned:  GBS:  Breastfeeding: (give breastfeeding book at 28w visit)  Vacuum Consent Reviewed: (around 28 weeks)       Nausea/vomiting in pregnancy 08/28/2017     Priority: Medium     Contraceptive management 07/06/2014     Priority: Medium        Past Medical History:    Past Medical History:   Diagnosis Date     No pertinent past medical history        Past Surgical History:    Past Surgical History:   Procedure Laterality Date     LAPAROTOMY MINI, TUBAL LIGATION (POST PARTUM), COMBINED Bilateral 1/14/2021    Procedure: LIGATION, FALLOPIAN TUBE, POSTPARTUM;  Surgeon: Swati Randle MD;  Location:  OR     OTHER SURGICAL HISTORY      [place] [id number],NO PAST SURGERIES       Family History:    Family History   Problem Relation Age of Onset     Hypertension Father         Hypertension     Myocardial Infarction Father 50   No family history of bleeding disorders    Social History:  Marital Status:   [2]  Social History     Tobacco Use     Smoking status: Never Smoker     Smokeless tobacco: Never Used   Substance Use Topics     Alcohol use: No     Alcohol/week: 0.0 standard drinks     Drug use: No        Medications:    No regular medications    Review of Systems  Please see HPI above for pertinent positives and negatives.  All other systems reviewed and found to be negative.    Physical Exam   BP: 135/76  Pulse: 100  Temp: 97.8  F (36.6  C)  Resp: 16  Height: 167.6 cm (5' 6\")  Weight: 95.3 kg (210 " lb)  SpO2: 99 %      Physical Exam  Gen: Lying in bed, no acute distress, alert  HEENT: NC/AT, slightly dry mucous membranes, conjunctivae clear without pallor  CV: Sinus tachycardia, appears warm and well-perfused, <2 second cap refill  Pulm: CTAB, normal respiratory effort  Abd: Soft, NT, ND, no guarding/rebound or masses  Pelvic: Deferred  Skin: No rash or other lesions. Slightly pale  MSK: No gross deformities or swelling  Neuro: A&O x4, no focal deficits    ED Course     ED Course as of Jun 18 2259 Fri Jun 18, 2021 2057 UPT negative. Urinalysis with large amount of blood; poor sample with 15 squamous cells, presence of moderate leukocyte esterase and 70 WBC's in non-specific and in absence of UTI symptoms very low suspicion for UTI, will not add-on for culture      2121 CBC with microcytic anemia (hemoglobin low at 11) consistent with mild blood loss anemia, normal platelets and white count      2132 INR and PTT within normal parameters      2144 BMP unremarkable      2203 Patient re-evaluated and updated on results, plan for non-hormonal trial of treatment with TXA and ibuprofen, continue plan for close outpatient follow-up; will follow-up final ultrasound result before discharge      2204 Prolactin and TSH within normal limits      2233 US pelvic final report:  FINDINGS:   Uterus/cervix: Uterine dimensions 8.5 x 4.4 x 6.1 cm. Endometrial stripe 2.0 cm.   Right adnexa: Right ovary 3.4 x 3.2 x 2.8 cm.  Normal follicular change.  Left adnexa: Left ovary 4.9 x 3.7 x 2.1 cm. 2.7 cm benign-appearing cyst.   Intraperitoneal space: Trace free fluid the cul-de-sac.                                                                       IMPRESSION:   Nonspecific thickening of the endometrium 2.0 cm.        Procedures               Critical Care time:  none               Results for orders placed or performed during the hospital encounter of 06/18/21 (from the past 24 hour(s))   HCG qualitative urine   Result Value  Ref Range    HCG Qual Urine Negative NEG^Negative   UA reflex to Microscopic   Result Value Ref Range    Color Urine Yellow     Appearance Urine Cloudy     Glucose Urine Negative NEG^Negative mg/dL    Bilirubin Urine Negative NEG^Negative    Ketones Urine Negative NEG^Negative mg/dL    Specific Gravity Urine 1.024 1.003 - 1.035    Blood Urine Large (A) NEG^Negative    pH Urine 5.5 5.0 - 7.0 pH    Protein Albumin Urine 30 (A) NEG^Negative mg/dL    Urobilinogen mg/dL Normal 0.0 - 2.0 mg/dL    Nitrite Urine Negative NEG^Negative    Leukocyte Esterase Urine Moderate (A) NEG^Negative    Source Unspecified Urine     RBC Urine >182 (H) 0 - 2 /HPF    WBC Urine 70 (H) 0 - 5 /HPF    Squamous Epithelial /HPF Urine 15 (H) 0 - 1 /HPF   CBC with platelets differential   Result Value Ref Range    WBC 10.0 4.0 - 11.0 10e9/L    RBC Count 5.12 3.8 - 5.2 10e12/L    Hemoglobin 11.0 (L) 11.7 - 15.7 g/dL    Hematocrit 36.2 35.0 - 47.0 %    MCV 71 (L) 78 - 100 fl    MCH 21.5 (L) 26.5 - 33.0 pg    MCHC 30.4 (L) 31.5 - 36.5 g/dL    RDW 16.5 (H) 10.0 - 15.0 %    Platelet Count 356 150 - 450 10e9/L    Diff Method Automated Method     % Neutrophils 67.7 %    % Lymphocytes 25.0 %    % Monocytes 3.8 %    % Eosinophils 3.0 %    % Basophils 0.3 %    % Immature Granulocytes 0.2 %    Absolute Neutrophil 6.8 1.6 - 8.3 10e9/L    Absolute Lymphocytes 2.5 0.8 - 5.3 10e9/L    Absolute Monocytes 0.4 0.0 - 1.3 10e9/L    Absolute Eosinophils 0.3 0.0 - 0.7 10e9/L    Absolute Basophils 0.0 0.0 - 0.2 10e9/L    Abs Immature Granulocytes 0.0 0 - 0.4 10e9/L   Basic metabolic panel   Result Value Ref Range    Sodium 136 134 - 144 mmol/L    Potassium 3.9 3.5 - 5.1 mmol/L    Chloride 103 98 - 107 mmol/L    Carbon Dioxide 25 21 - 31 mmol/L    Anion Gap 8 3 - 14 mmol/L    Glucose 126 (H) 70 - 105 mg/dL    Urea Nitrogen 13 7 - 25 mg/dL    Creatinine 0.83 0.60 - 1.20 mg/dL    GFR Estimate 82 >60 mL/min/[1.73_m2]    GFR Estimate If Black >90 >60 mL/min/[1.73_m2]     Calcium 9.5 8.6 - 10.3 mg/dL   INR   Result Value Ref Range    INR 1.04 0.86 - 1.14   Partial thromboplastin time   Result Value Ref Range    PTT 30 22 - 37 sec   TSH Reflex GH   Result Value Ref Range    TSH Reflex 2.06 0.34 - 5.60 IU/mL   Prolactin GH   Result Value Ref Range    Prolactin 10.13 ng/mL   US Pelvic Complete with Transvaginal    Narrative    PROCEDURE INFORMATION:   Exam: US Pelvis, Transvaginal   Exam date and time: 2021 9:35 PM   Age: 30 years old   Clinical indication: Menstruation abnormalities; Excessive menstruation;   Additional info: Abnormal uterine bleeding     TECHNIQUE:   Imaging protocol: Real-time transvaginal pelvic ultrasound with image   documentation. Transvaginal imaging was used for better evaluation of the   endometrium, adnexa, and/or cervix.     COMPARISON:   No relevant prior studies available.     FINDINGS:   Uterus/cervix: Uterine dimensions 8.5 x 4.4 x 6.1 cm. Endometrial stripe 2.0 cm.   Right adnexa: Right ovary 3.4 x 3.2 x 2.8 cm.  Normal follicular change.  Left adnexa: Left ovary 4.9 x 3.7 x 2.1 cm. 2.7 cm benign-appearing cyst.   Intraperitoneal space: Trace free fluid the cul-de-sac.       Impression    IMPRESSION:   Nonspecific thickening of the endometrium 2.0 cm.       THIS DOCUMENT HAS BEEN ELECTRONICALLY SIGNED BY TOM RODRÍGUEZ MD       Medications   0.9% sodium chloride BOLUS (0 mLs Intravenous Stopped 21 [id number])       Assessments & Plan (with Medical Decision Making)   22-year-old woman  (status post ligation after  2021), otherwise healthy presents for evaluation of vaginal bleeding for over 2 weeks.  Vitally stable on arrival but tachycardic to 100 bpm, normotensive, afebrile, saturating well on room air.  Patient nontoxic and overall well-appearing but slightly pale, otherwise unremarkable cardiopulmonary exam and with strong peripheral pulses.  Lab work notable for mild microcytic anemia with hemoglobin of 11 and MCV of 71  consistent with mild blood loss anemia.  This is not far outside of expected range of normal menstrual period, I am not concerned for severe clinically significant blood loss.  Remainder of lab work unrevealing including prolactin and TSH.  Pelvic ultrasound was performed and this showed nonspecific thickening of the endometrial stripe of 2 cm which can be expected during her menstrual period.  Pelvic exam deferred at this time, there is a low suspicion for vaginal or cervical lesions precipitating bleeding given pelvic ultrasound findings.  Given prolonged duration of heavy menses discussed with patient hormonal treatment options versus trial of NSAIDs and TXA, patient prefers the latter she would like to avoid hormones if possible.  I think this is reasonable given that she remained stable.  She did receive a fluid bolus for possible dehydration and her heart rate did improve slightly.  At this time patient is appropriate for discharge with close outpatient follow-up with Dr. Orosco and OB/GYN clinic on Monday as scheduled, careful return precautions reviewed.    From ED discharge instructions:  Your hemoglobin level today was slightly low but not severe. Your ultrasound did not show any acutely concerning findings. Continue to monitor and if bleeding gets worse, or if feeling much more lightheaded or short of breath, then call 911 or come back to the emergency department for repeat evaluation.    Drink plenty of fluids and eat a well-balanced diet.    Take ibuprofen 600 mg once daily for treatment of your bleeding.    Also take TXA (tranexamic acid) 2 tablets three times daily as prescribed for treatment of your bleeding.    Follow up with Dr. Good as scheduled on Monday at 3 PM in Ob/Gyn clinic.        I have reviewed the nursing notes.    I have reviewed the findings, diagnosis, plan and need for follow up with the patient.       Discharge Medication List as of 6/18/2021 10:35 PM      START taking these  medications    Details   tranexamic acid (LYSTEDA) 650 MG tablet Take 2 tablets (1,300 mg) by mouth 3 times daily, Disp-30 tablet, R-0, E-Prescribe             Final diagnoses:   Abnormal uterine bleeding       6/18/2021   [facility] CenterDayne MD  06/18/21 1137

## 2021-06-19 NOTE — DISCHARGE INSTRUCTIONS
Your hemoglobin level today was slightly low but not severe. Your ultrasound did not show any acutely concerning findings. Continue to monitor and if bleeding gets worse, or if feeling much more lightheaded or short of breath, then call 911 or come back to the emergency department for repeat evaluation.    Drink plenty of fluids and eat a well-balanced diet.    Take ibuprofen 600 mg once daily for treatment of your bleeding.    Also take TXA (tranexamic acid) 2 tablets three times daily as prescribed for treatment of your bleeding.    Follow up with Dr. Good as scheduled on Monday at 3 PM in Ob/Gyn clinic.

## 2021-06-19 NOTE — ED TRIAGE NOTES
Patient presents to ED via private car with significant other with report of vaginal bleeding since June 1st, had a baby and tubal in January and this is her 1st menstrual cycle since. Bleeding does not seem to be decreasing since start

## 2021-06-21 ENCOUNTER — OFFICE VISIT (OUTPATIENT)
Dept: OBGYN | Facility: OTHER | Age: 31
End: 2021-06-21
Attending: OBSTETRICS & GYNECOLOGY
Payer: MEDICAID

## 2021-06-21 ENCOUNTER — MYC MEDICAL ADVICE (OUTPATIENT)
Dept: OBGYN | Facility: OTHER | Age: 31
End: 2021-06-21

## 2021-06-21 VITALS
RESPIRATION RATE: 20 BRPM | BODY MASS INDEX: 40.03 KG/M2 | DIASTOLIC BLOOD PRESSURE: 80 MMHG | HEART RATE: 107 BPM | WEIGHT: 248 LBS | SYSTOLIC BLOOD PRESSURE: 134 MMHG

## 2021-06-21 DIAGNOSIS — N93.9 ABNORMAL UTERINE BLEEDING (AUB): Primary | ICD-10-CM

## 2021-06-21 DIAGNOSIS — E66.01 MORBID OBESITY (H): ICD-10-CM

## 2021-06-21 DIAGNOSIS — N93.9 ABNORMAL UTERINE BLEEDING (AUB): ICD-10-CM

## 2021-06-21 PROCEDURE — 99204 OFFICE O/P NEW MOD 45 MIN: CPT | Performed by: OBSTETRICS & GYNECOLOGY

## 2021-06-21 PROCEDURE — G0463 HOSPITAL OUTPT CLINIC VISIT: HCPCS

## 2021-06-21 RX ORDER — PROGESTERONE 100 MG/1
100 CAPSULE ORAL DAILY
Qty: 10 CAPSULE | Refills: 0 | Status: SHIPPED | OUTPATIENT
Start: 2021-06-21 | End: 2021-06-22

## 2021-06-21 ASSESSMENT — PAIN SCALES - GENERAL: PAINLEVEL: NO PAIN (0)

## 2021-06-21 NOTE — PROGRESS NOTES
CC: Abnormal vaginal bleeding  HPI:  Tasia is a 30 year old female who presents for bleeding for the last 20 days. She had a vaginal delivery on 21 with bilateral salpingoectomy on 21 and this is the first menstrual cycle she has had since these procedures. She describes the bleeding as heavy and passing lots clots, some the size of half dollars. She denies cramping or bloating. She was seen in the ED on 21 and was prescribed Lysteda, which she did not take due to medication expense. The bleeding did stop yesterday. She endorses headaches/migraines daily but denies chest pain, palpitations, syncope, or SOB.  No LMP recorded. (Menstrual status: Tubal ).  Menstrual history: vaginal delivery 21 - first menstrual cycle since; tubal ligation at that time; no vaginal discharge, itching  Bladder concerns: no frequency, urgency, or burning  Bowel concerns: diarrhea occasionally - occurs with periods; denies constipation or abdominal pain  Breast concerns: not breast feeding; no tenderness, heat, or swelling  Birth control: tubal ligation only- waited 5 weeks after procedure  STI history: No concerns    Pap Smears: low grade changes in  - due for repeat  Mammograms: never done    OB History    Para Term  AB Living   2 2 2 0 0 2   SAB TAB Ectopic Multiple Live Births   0 0 0 0 2      # Outcome Date GA Lbr Paul/2nd Weight Sex Delivery Anes PTL Lv   2 Term 21 40w2d 11:46 / 00:10 3.751 kg (8 lb 4.3 oz) F Vag-Spont INT N FREDDIE      Name: Carolynn Rizo      Apgar1: 8  Apgar5: 9   1 Term 18 40w3d 06:36 / 00:22 3.308 kg (7 lb 4.7 oz) F Vag-Spont IV N FREDDIE      Name: YANIRA RIVERO      Apgar1: 9  Apgar5: 9     Past Medical History:   Diagnosis Date     No pertinent past medical history      Past Surgical History:   Procedure Laterality Date     LAPAROTOMY MINI, TUBAL LIGATION (POST PARTUM), COMBINED Bilateral 2021    Procedure: LIGATION, FALLOPIAN TUBE,  POSTPARTUM;  Surgeon: Swati Randle MD;  Location: [facility] OR     OTHER SURGICAL HISTORY      [id number],NO PAST SURGERIES     Social History     Socioeconomic History     Marital status:      Spouse name: Not on file     Number of children: Not on file     Years of education: Not on file     Highest education level: Not on file   Occupational History     Not on file   Social Needs     Financial resource strain: Not on file     Food insecurity     Worry: Not on file     Inability: Not on file     Transportation needs     Medical: Not on file     Non-medical: Not on file   Tobacco Use     Smoking status: Never Smoker     Smokeless tobacco: Never Used   Substance and Sexual Activity     Alcohol use: No     Alcohol/week: 0.0 standard drinks     Drug use: No     Sexual activity: Yes     Partners: Male     Birth control/protection: Female Surgical     Comment: tubal   Lifestyle     Physical activity     Days per week: Not on file     Minutes per session: Not on file     Stress: Not on file   Relationships     Social connections     Talks on phone: Not on file     Gets together: Not on file     Attends [employer] service: Not on file     Active member of club or organization: Not on file     Attends meetings of clubs or organizations: Not on file     Relationship status: Not on file     Intimate partner violence     Fear of current or ex partner: Not on file     Emotionally abused: Not on file     Physically abused: Not on file     Forced sexual activity: Not on file   Other Topics Concern     Not on file   Social History Narrative     Not on file     Family History   Problem Relation Age of Onset     Hypertension Father         Hypertension     Myocardial Infarction Father 50       Current Outpatient Medications   Medication     progesterone (PROMETRIUM) 100 MG capsule     No current facility-administered medications for this visit.      No Known Allergies  /80 (BP Location: Right arm, Patient Position:  Sitting, Cuff Size: Adult Large)   Pulse 107   Resp 20   Wt 112.5 kg (248 lb)   BMI 40.03 kg/m      REVIEW OF SYSTEMS  General: negative  GI: negative  : negative    Exam:  Constitutional: healthy, alert and no distress  CV: RRR no GRM   Resp: CTA B   Abdomen: NT, ND   US images reviewed    Lab: No results found for any visits on 06/21/21.    ASSESSMENT/PLAN :  1. Abnormal uterine bleeding (AUB)    2. Morbid obesity (H)      Tasia is a 30 year old female that presents today for 20 day history of abnormal uterine bleeding. The ER did an extensive workup, therefore repeat labs or imaging are unnecessary. She is currently not interested in taking OCPs or other forms of birth control to stop the bleeding as this is why she proceeded with the tubal ligation. She has agreed to take Prometrium 100mg PO daily for 10 days to induce a period and hopefully get her cycles back to regular. She is encouraged to follow up by the beginning of August if things are not better for discussion of next step options.     JIM Feldman MD FACOG  3:11 PM 6/21/2021

## 2021-06-21 NOTE — NURSING NOTE
"Chief Complaint   Patient presents with     Consult     abnormal bleeding, ER follow up   Patient presents to clinic consult regarding irregular bleeding. Was seen in ER over the weekend. Bleeding began on 6/1/21 and finally let up almost completely today. Very heavy, large clots, mild cramping. Was changing pads/tampons every hour throughout the day. Prior period was before she got pregnant with her daughter who was born this January.     Initial /80 (BP Location: Right arm, Patient Position: Sitting, Cuff Size: Adult Large)   Pulse 107   Resp 20   Wt 112.5 kg (248 lb)   BMI 40.03 kg/m   Estimated body mass index is 40.03 kg/m  as calculated from the following:    Height as of 6/18/21: 1.676 m (5' 6\").    Weight as of this encounter: 112.5 kg (248 lb).  Medication Reconciliation: complete    FINA MARTINEZ, YASMINEN  "

## 2021-06-22 ENCOUNTER — MEDICAL CORRESPONDENCE (OUTPATIENT)
Dept: HEALTH INFORMATION MANAGEMENT | Facility: OTHER | Age: 31
End: 2021-06-22

## 2021-06-22 RX ORDER — PROGESTERONE 100 MG/1
100 CAPSULE ORAL DAILY
Qty: 10 CAPSULE | Refills: 0 | Status: SHIPPED | OUTPATIENT
Start: 2021-06-22 | End: 2021-11-04

## 2021-06-22 NOTE — TELEPHONE ENCOUNTER
Talked with pharmacy and they do not have a Deanna Melgar on file but they do have a Deanna Omayra with same  and address but does not have the Prometrium. Call placed to patient to discuss and she states that the clinic has not been able to change her last name to Glime because she does not have her DL to show name change. Pharmacy suggested to resend Rx. Rx resent and patient aware and advised patient to call if she has any issues picking up her Rx. Patient voiced understanding and has no further questions or concerns at this time.   Smiley Randle RN on 2021 at 11:30 AM

## 2021-06-28 ENCOUNTER — OFFICE VISIT (OUTPATIENT)
Dept: OBGYN | Facility: OTHER | Age: 31
End: 2021-06-28
Attending: OBSTETRICS & GYNECOLOGY
Payer: MEDICAID

## 2021-06-28 ENCOUNTER — MYC MEDICAL ADVICE (OUTPATIENT)
Dept: OBGYN | Facility: OTHER | Age: 31
End: 2021-06-28

## 2021-06-28 VITALS
BODY MASS INDEX: 40.03 KG/M2 | DIASTOLIC BLOOD PRESSURE: 64 MMHG | SYSTOLIC BLOOD PRESSURE: 110 MMHG | RESPIRATION RATE: 18 BRPM | WEIGHT: 248 LBS | HEART RATE: 90 BPM

## 2021-06-28 DIAGNOSIS — N93.9 ABNORMAL UTERINE BLEEDING (AUB): Primary | ICD-10-CM

## 2021-06-28 LAB
ERYTHROCYTE [DISTWIDTH] IN BLOOD BY AUTOMATED COUNT: 16.7 % (ref 10–15)
HCT VFR BLD AUTO: 38.6 % (ref 35–47)
HGB BLD-MCNC: 11.7 G/DL (ref 11.7–15.7)
MCH RBC QN AUTO: 21 PG (ref 26.5–33)
MCHC RBC AUTO-ENTMCNC: 30.3 G/DL (ref 31.5–36.5)
MCV RBC AUTO: 69 FL (ref 78–100)
PLATELET # BLD AUTO: 373 10E9/L (ref 150–450)
RBC # BLD AUTO: 5.56 10E12/L (ref 3.8–5.2)
WBC # BLD AUTO: 9.6 10E9/L (ref 4–11)

## 2021-06-28 PROCEDURE — 99214 OFFICE O/P EST MOD 30 MIN: CPT | Performed by: OBSTETRICS & GYNECOLOGY

## 2021-06-28 PROCEDURE — G0463 HOSPITAL OUTPT CLINIC VISIT: HCPCS

## 2021-06-28 PROCEDURE — 36415 COLL VENOUS BLD VENIPUNCTURE: CPT | Mod: ZL | Performed by: OBSTETRICS & GYNECOLOGY

## 2021-06-28 PROCEDURE — 85027 COMPLETE CBC AUTOMATED: CPT | Mod: ZL | Performed by: OBSTETRICS & GYNECOLOGY

## 2021-06-28 RX ORDER — NORGESTIMATE AND ETHINYL ESTRADIOL 0.25-0.035
KIT ORAL
Qty: 28 TABLET | Refills: 3 | Status: SHIPPED | OUTPATIENT
Start: 2021-06-28 | End: 2021-11-04

## 2021-06-28 ASSESSMENT — PAIN SCALES - GENERAL: PAINLEVEL: SEVERE PAIN (6)

## 2021-06-28 NOTE — PATIENT INSTRUCTIONS
Patient Education     Hysterectomy: Surgical Procedures  A hysterectomy is the removal of a woman s uterus. It can relieve symptoms such as severe pain and bleeding. If you have cancer, it may save your life. You will discuss what type of surgery you will have with your healthcare provider. This will depend on your health problem. If you have any questions or concerns, let your healthcare provider know.   Reaching the uterus  There are several ways to reach the uterus to remove it. The best approach depends on the reason for your surgery. The 3 main approaches are described below:     Vaginal. An incision is made inside the vagina. The uterus is then removed through this incision. This can be done if the uterus is not too large.    Laparoscopic. A thin tube with a camera and a light on the end is used. This is called a laparoscope. The doctor makes 2 to 4 small incisions in the abdomen. The scope is put through 1 of the incisions. The scope sends live pictures to a video screen. This allows the doctor see inside the abdomen. Surgical tools are placed through the other small incisions. The uterus can be removed through these incisions or through an incision made in the vagina. One of the following procedures may be done:  ? The uterus is removed through a small incision in the vagina. This is called  LAVH.   ? The uterus is removed through the small incisions in the abdomen. The cervix is left in place. This is called  LSH.   ? The uterus and cervix are removed through the small incisions in the abdomen. This is called  TLH.   ? During any of these procedures, robotic technique may be used. This assists the surgeon s vision and hand movements.    Abdominal. One incision of 4 to 6 inches is made in the abdomen. The uterus is removed through this incision.   Types of Hysterectomy     Total hysterectomy       Subtotal hysterectomy       Hysterectomy with salpingo-oophorectomy      When the uterus is removed, the  cervix may be left in place. Or it may also be removed. If it s removed, the top of the vagina is closed. In certain cases, the ovaries and fallopian tubes are also removed.      Removing the uterus. During a total (simple) hysterectomy, the uterus and cervix are removed. During a subtotal hysterectomy, only the uterus is removed. The cervix is left in place. This is also called a supracervical hysterectomy. In either case, the ovaries remain and the fallopian tubes are usually removed to reduce the risk of future cancer related to the fallopian tubes. If you have not yet reached menopause, the ovaries will keep making hormones. You may still feel the changes of menstrual cycles. But you will not have periods and can t become pregnant.    Removing the uterus, ovaries, and tubes. Along with the uterus, the ovaries and fallopian tubes may also be removed. This is called a hysterectomy with salpingo-oophorectomy. It causes the body s estrogen levels to drop quickly. This is called  surgical  menopause. Women who have not reached menopause before surgery may have sudden symptoms. But these symptoms can be treated, most commonly through estrogen replacement therapy.  Deciding on hysterectomy  You and your healthcare provider can discuss the best options for you. As you decide, your provider may ask you to think about the following:     Is your health problem getting in the way of your daily life? Is the problem getting worse? If not, might other treatments be tried first?    Do you want to have children? If so, other treatments should be considered.    Should the fallopian tubes be removed too? This will reduce the chances of ovarian cancer since we now know that many ovarian cancers actually come from the tubes.    Should the ovaries be removed too? This is often done to treat or prevent cancer. If removal is needed, talk to your healthcare provider about estrogen replacement therapy.  Risks and possible complications  of a hysterectomy     Side effects from the anesthesia    Infection    Bleeding, with a possible need for transfusion    Damage to nearby organs (bladder, bowel, ureters, or nearby nerves or blood vessels)    Blood clots in the legs or lungs    Formation of scar tissue that may cause pain or bowel obstruction in the future. This is more common with the abdominal approach.    Need for second surgery    Oj last reviewed this educational content on 6/1/2020 2000-2021 [employer]. All rights reserved. This information is not intended as a substitute for professional medical care. Always follow your healthcare professional's instructions.

## 2021-06-28 NOTE — PROGRESS NOTES
S: Patient presents for reevaluation after heavy bleeding this weekend on oral progesterone. She had to come home from [facility] due to the heaviness of the bleeding. She has felt a little lightheaded and was changing protection every two hours all night in addition to caring for her baby [place] the camp site.  US showed a thickened endometrium. Upon further questioning she doesn't get regular cycles when not pregnant either, and doesn't feel well with hormones. She is nervous about having an IUD.    O: /64 (BP Location: Right arm, Patient Position: Sitting, Cuff Size: Adult Large)   Pulse 90   Resp 18   Wt 112.5 kg (248 lb)   BMI 40.03 kg/m      NAD  Speculum: normal cervix with modest amount of red/brown blood. No obvious abnormality.  Bimanual confirms a normal sized anteverted uterus, with normal shape and mobility.    Results for orders placed or performed in visit on 06/28/21   CBC W PLT No Diff     Status: Abnormal   Result Value Ref Range    WBC 9.6 4.0 - 11.0 10e9/L    RBC Count 5.56 (H) 3.8 - 5.2 10e12/L    Hemoglobin 11.7 11.7 - 15.7 g/dL    Hematocrit 38.6 35.0 - 47.0 %    MCV 69 (L) 78 - 100 fl    MCH 21.0 (L) 26.5 - 33.0 pg    MCHC 30.3 (L) 31.5 - 36.5 g/dL    RDW 16.7 (H) 10.0 - 15.0 %    Platelet Count 373 150 - 450 10e9/L      I/P  Reviewed her US from last week which did show an thickened endometrium.  Switch to combined OCP. Take two daily for the week, then one daily next week, then off.  She was given literature on IUD, ablation, and hysterectomy in case the OCP's don't slow her bleeding.

## 2021-06-28 NOTE — NURSING NOTE
"Chief Complaint   Patient presents with     Follow Up     abnormal bleeding   Patient presents to clinic for follow up from abnormal uterine bleeding. Still having heavy bleeding, now having cramping as well. Has taken 5 doses of the Progesterone. Had to leave family vacation because she is so \"miserable\". Was feeling very dizzy yesterday also.    Initial There were no vitals taken for this visit. Estimated body mass index is 40.03 kg/m  as calculated from the following:    Height as of 6/18/21: 1.676 m (5' 6\").    Weight as of 6/21/21: 112.5 kg (248 lb).  Medication Reconciliation: complete    FINA MARTINEZ, LPN  "

## 2021-06-28 NOTE — TELEPHONE ENCOUNTER
Call placed to patient. Patient states she has had a lot of bleeding throughout the weekend and feeling fatigued and lightheaded. Advised patient we want her to be seen this AM. Patient agrees and transferred to Unit [facility] . No further questions or concerns at this time.   Smiley Randle RN on 6/28/2021 at 8:11 AM

## 2021-09-22 ENCOUNTER — ALLIED HEALTH/NURSE VISIT (OUTPATIENT)
Dept: FAMILY MEDICINE | Facility: OTHER | Age: 31
End: 2021-09-22
Attending: FAMILY MEDICINE
Payer: MEDICAID

## 2021-09-22 DIAGNOSIS — R68.83 CHILLS: Primary | ICD-10-CM

## 2021-09-22 PROCEDURE — C9803 HOPD COVID-19 SPEC COLLECT: HCPCS

## 2021-09-22 PROCEDURE — U0005 INFEC AGEN DETEC AMPLI PROBE: HCPCS | Mod: ZL

## 2021-09-23 LAB — SARS-COV-2 RNA RESP QL NAA+PROBE: POSITIVE

## 2021-10-12 ENCOUNTER — OFFICE VISIT (OUTPATIENT)
Dept: OBGYN | Facility: OTHER | Age: 31
End: 2021-10-12
Attending: OBSTETRICS & GYNECOLOGY
Payer: COMMERCIAL

## 2021-10-12 ENCOUNTER — PREP FOR PROCEDURE (OUTPATIENT)
Dept: OBGYN | Facility: OTHER | Age: 31
End: 2021-10-12

## 2021-10-12 VITALS
DIASTOLIC BLOOD PRESSURE: 82 MMHG | BODY MASS INDEX: 36.74 KG/M2 | RESPIRATION RATE: 16 BRPM | SYSTOLIC BLOOD PRESSURE: 116 MMHG | HEART RATE: 100 BPM | WEIGHT: 227.6 LBS

## 2021-10-12 DIAGNOSIS — N92.1 MENORRHAGIA WITH IRREGULAR CYCLE: Primary | ICD-10-CM

## 2021-10-12 DIAGNOSIS — N92.1 MENOMETRORRHAGIA: Primary | ICD-10-CM

## 2021-10-12 PROCEDURE — 99214 OFFICE O/P EST MOD 30 MIN: CPT | Performed by: OBSTETRICS & GYNECOLOGY

## 2021-10-12 PROCEDURE — G0463 HOSPITAL OUTPT CLINIC VISIT: HCPCS

## 2021-10-12 RX ORDER — ACETAMINOPHEN 325 MG/1
975 TABLET ORAL ONCE
Status: CANCELLED | OUTPATIENT
Start: 2021-10-12 | End: 2021-10-12

## 2021-10-12 RX ORDER — CEFAZOLIN SODIUM 2 G/100ML
2 INJECTION, SOLUTION INTRAVENOUS
Status: CANCELLED | OUTPATIENT
Start: 2021-10-12

## 2021-10-12 RX ORDER — CEFAZOLIN SODIUM 2 G/100ML
2 INJECTION, SOLUTION INTRAVENOUS SEE ADMIN INSTRUCTIONS
Status: CANCELLED | OUTPATIENT
Start: 2021-10-12

## 2021-10-12 RX ORDER — PHENAZOPYRIDINE HYDROCHLORIDE 100 MG/1
200 TABLET, FILM COATED ORAL ONCE
Status: CANCELLED | OUTPATIENT
Start: 2021-10-12 | End: 2021-10-12

## 2021-10-12 ASSESSMENT — PAIN SCALES - GENERAL: PAINLEVEL: NO PAIN (0)

## 2021-10-12 NOTE — NURSING NOTE
"Date of Surgery: 11/11/21  Type of Surgery: Total Vaginal Hysterectomy, Bilateral Salpingectomy, Cystoscopy  Surgeon: Dr. Phani Good     appropriate appointments were scheduled by the [facility]  Patient was given surgical folder . Surgical forms were copied and kept for informative purposes. Originals were delivered to day surgery. Questions were answered to the best of this nurse's ability.        STOP BANG    Fever/Chills or other infectious symptoms in past month? Yes, COVID 9/22/21  >10 pound weight loss in the past 2 months? no  Health Care Directive on file? no  History of blood transfusions? no  Td up to date? yes  History of VRE/MRSA? no      Obstructive Sleep Apnea screening    Preoperative Evaluation: Obstructive Sleep Apnea screening    S: Snore -  Do you snore loudly? (louder than talking or loud enough to be heard through closed doors) Yes  T: Tired - Do you often feel tired, fatigued, or sleepy during the daytime?No  O: Observed - Has anyone ever observed you stop breathing during your sleep?No  P: Pressure - Do you have or are you being treated for high blood pressure?No  B: BMI - BMI greater than 35kg/m2?Yes  A: Age - Age over 50 years old?No  N: Neck - Neck circumference greater than 40 cm?No  G: Gender - Gender: Male?No    Total number of \"YES\" responses:  2    Scoring: Low risk of ERAN 0-2  At Risk of ERAN: >3 High Risk of ERAN: 5-8      Total yes answers in ERAN section:    Low risk 0-2  At risk 3-4  High risk 5-8    Susan Marroquin RN............. 10/12/2021 9:08 AM     "

## 2021-10-12 NOTE — PROGRESS NOTES
CC: continued heavy menses  HPI:  Tasia is a 30 year old female who presents for evaluation of heavy and irregular periods. She is bleeding 3 out of 4 weeks per month. It got better for one month on the pill. She continues to bleed today. She feels poorly on any hormone she has tired, and failed IUD and Depo Provera in the past. She has had her tubes tied for birth control.  She would like surgical management definitively. Last pap was 2020.    No LMP recorded. (Menstrual status: Tubal ).      OB History    Para Term  AB Living   2 2 2 0 0 2   SAB TAB Ectopic Multiple Live Births   0 0 0 0 2      # Outcome Date GA Lbr Paul/2nd Weight Sex Delivery Anes PTL Lv   2 Term 21 40w2d 11:46 / 00:10 3.751 kg (8 lb 4.3 oz) F Vag-Spont INT N FREDDIE      Name: Sallie Carolynn Ni      Apgar1: 8  Apgar5: 9   1 Term 18 40w3d 06:36 / 00:22 3.308 kg (7 lb 4.7 oz) F Vag-Spont IV N FREDDIE      Name: YANIRA RIVERO      Apgar1: 9  Apgar5: 9     Past Medical History:   Diagnosis Date     No pertinent past medical history      Past Surgical History:   Procedure Laterality Date     LAPAROTOMY MINI, TUBAL LIGATION (POST PARTUM), COMBINED Bilateral 2021    Procedure: LIGATION, FALLOPIAN TUBE, POSTPARTUM;  Surgeon: Swati Randle MD;  Location:  OR     OTHER SURGICAL HISTORY      [id number],NO PAST SURGERIES     Social History     Socioeconomic History     Marital status:      Spouse name: Not on file     Number of children: Not on file     Years of education: Not on file     Highest education level: Not on file   Occupational History     Not on file   Tobacco Use     Smoking status: Never Smoker     Smokeless tobacco: Never Used   Substance and Sexual Activity     Alcohol use: No     Alcohol/week: 0.0 standard drinks     Drug use: No     Sexual activity: Yes     Partners: Male     Birth control/protection: Female Surgical     Comment: tubal   Other Topics Concern     Not on file   Social  History Narrative     Not on file     Social Determinants of Health     Financial Resource Strain:      Difficulty of Paying Living Expenses:    Food Insecurity:      Worried About Running Out of Food in the Last Year:      Ran Out of Food in the Last Year:    Transportation Needs:      Lack of Transportation (Medical):      Lack of Transportation (Non-Medical):    Physical Activity:      Days of Exercise per Week:      Minutes of Exercise per Session:    Stress:      Feeling of Stress :    Social Connections:      Frequency of Communication with Friends and Family:      Frequency of Social Gatherings with Friends and Family:      Attends [employer] Services:      Active Member of Clubs or Organizations:      Attends Club or Organization Meetings:      Marital Status:    Intimate Partner Violence:      Fear of Current or Ex-Partner:      Emotionally Abused:      Physically Abused:      Sexually Abused:      Family History   Problem Relation Age of Onset     Hypertension Father         Hypertension     Myocardial Infarction Father 50     Current Outpatient Medications   Medication     norgestimate-ethinyl estradiol (ORTHO-CYCLEN) 0.25-35 MG-MCG tablet     progesterone (PROMETRIUM) 100 MG capsule     No current facility-administered medications for this visit.     No Known Allergies  /82 (BP Location: Right arm, Patient Position: Sitting, Cuff Size: Adult Large)   Pulse 100   Resp 16   Wt 103.2 kg (227 lb 9.6 oz)   Breastfeeding No   BMI 36.74 kg/m      REVIEW OF SYSTEMS  Negative for bowel bladder breast    Exam:  Constitutional: healthy, alert, active and no distress  CV: RRR no GRM   Resp: CTA B   Pelvic exam deferred to the OR.  Please see my prior examination from last month.    Lab: No results found for any visits on 10/12/21.    ASSESSMENT/PLAN :  1. Menometrorrhagia      We did discuss risks benefits and alternatives for management of her heavy and irregular periods.  Given the fact that she is  already failed hormonal manipulation and feels poorly while doing it she would like definitive surgical management.  Because of her young age the likelihood is that she would fail endometrial ablation being only 30 years of age.  Literature would suggest that her best option at this time would be vaginal hysterectomy.  She would like to schedule this at her earliest convenience and is okay for spinal or general anesthesia.  Apparently she had a spinal anesthetic for her tubal ligation which failed and she would prefer general.  Risks and benefits of surgery were discussed in detail including small chances for bleeding, infection, and injury to organs that are located in the vicinity of the uterus.  We will schedule her for vaginal hysterectomy with cystoscopy following the case.  She has already had salpingectomy for sterilization.  Would remove any tubal fragments found at the time of her hysterectomy.  We will schedule this as an outpatient procedure if possible.    Phani Good MD FACOG  8:47 AM 10/12/2021

## 2021-10-12 NOTE — NURSING NOTE
"Chief Complaint   Patient presents with     Follow Up     heavy periods     Patient stated her periods have still been very heavy. Doesn't go more than 5 days without bleeding. Has to set an alarm in the night to change pad.    Initial /82 (BP Location: Right arm, Patient Position: Sitting, Cuff Size: Adult Large)   Pulse 100   Resp 16   Wt 103.2 kg (227 lb 9.6 oz)   Breastfeeding No   BMI 36.74 kg/m   Estimated body mass index is 36.74 kg/m  as calculated from the following:    Height as of 6/18/21: 1.676 m (5' 6\").    Weight as of this encounter: 103.2 kg (227 lb 9.6 oz).  Medication Reconciliation: Completed     Advanced Care Directive Reviewed    Isaias Diamond LPN  "

## 2021-10-12 NOTE — H&P (VIEW-ONLY)
CC: continued heavy menses  HPI:  Tasia is a 30 year old female who presents for evaluation of heavy and irregular periods. She is bleeding 3 out of 4 weeks per month. It got better for one month on the pill. She continues to bleed today. She feels poorly on any hormone she has tired, and failed IUD and Depo Provera in the past. She has had her tubes tied for birth control.  She would like surgical management definitively. Last pap was 2020.    No LMP recorded. (Menstrual status: Tubal ).      OB History    Para Term  AB Living   2 2 2 0 0 2   SAB TAB Ectopic Multiple Live Births   0 0 0 0 2      # Outcome Date GA Lbr Paul/2nd Weight Sex Delivery Anes PTL Lv   2 Term 21 40w2d 11:46 / 00:10 3.751 kg (8 lb 4.3 oz) F Vag-Spont INT N FREDDIE      Name: Sallie Carolynn In      Apgar1: 8  Apgar5: 9   1 Term 18 40w3d 06:36 / 00:22 3.308 kg (7 lb 4.7 oz) F Vag-Spont IV N FREDDIE      Name: YANIRA RIVERO      Apgar1: 9  Apgar5: 9     Past Medical History:   Diagnosis Date     No pertinent past medical history      Past Surgical History:   Procedure Laterality Date     LAPAROTOMY MINI, TUBAL LIGATION (POST PARTUM), COMBINED Bilateral 2021    Procedure: LIGATION, FALLOPIAN TUBE, POSTPARTUM;  Surgeon: Swati Randle MD;  Location:  OR     OTHER SURGICAL HISTORY      [id number],NO PAST SURGERIES     Social History     Socioeconomic History     Marital status:      Spouse name: Not on file     Number of children: Not on file     Years of education: Not on file     Highest education level: Not on file   Occupational History     Not on file   Tobacco Use     Smoking status: Never Smoker     Smokeless tobacco: Never Used   Substance and Sexual Activity     Alcohol use: No     Alcohol/week: 0.0 standard drinks     Drug use: No     Sexual activity: Yes     Partners: Male     Birth control/protection: Female Surgical     Comment: tubal   Other Topics Concern     Not on file   Social  History Narrative     Not on file     Social Determinants of Health     Financial Resource Strain:      Difficulty of Paying Living Expenses:    Food Insecurity:      Worried About Running Out of Food in the Last Year:      Ran Out of Food in the Last Year:    Transportation Needs:      Lack of Transportation (Medical):      Lack of Transportation (Non-Medical):    Physical Activity:      Days of Exercise per Week:      Minutes of Exercise per Session:    Stress:      Feeling of Stress :    Social Connections:      Frequency of Communication with Friends and Family:      Frequency of Social Gatherings with Friends and Family:      Attends [employer] Services:      Active Member of Clubs or Organizations:      Attends Club or Organization Meetings:      Marital Status:    Intimate Partner Violence:      Fear of Current or Ex-Partner:      Emotionally Abused:      Physically Abused:      Sexually Abused:      Family History   Problem Relation Age of Onset     Hypertension Father         Hypertension     Myocardial Infarction Father 50     Current Outpatient Medications   Medication     norgestimate-ethinyl estradiol (ORTHO-CYCLEN) 0.25-35 MG-MCG tablet     progesterone (PROMETRIUM) 100 MG capsule     No current facility-administered medications for this visit.     No Known Allergies  /82 (BP Location: Right arm, Patient Position: Sitting, Cuff Size: Adult Large)   Pulse 100   Resp 16   Wt 103.2 kg (227 lb 9.6 oz)   Breastfeeding No   BMI 36.74 kg/m      REVIEW OF SYSTEMS  Negative for bowel bladder breast    Exam:  Constitutional: healthy, alert, active and no distress  CV: RRR no GRM   Resp: CTA B   Pelvic exam deferred to the OR.  Please see my prior examination from last month.    Lab: No results found for any visits on 10/12/21.    ASSESSMENT/PLAN :  1. Menometrorrhagia      We did discuss risks benefits and alternatives for management of her heavy and irregular periods.  Given the fact that she is  already failed hormonal manipulation and feels poorly while doing it she would like definitive surgical management.  Because of her young age the likelihood is that she would fail endometrial ablation being only 30 years of age.  Literature would suggest that her best option at this time would be vaginal hysterectomy.  She would like to schedule this at her earliest convenience and is okay for spinal or general anesthesia.  Apparently she had a spinal anesthetic for her tubal ligation which failed and she would prefer general.  Risks and benefits of surgery were discussed in detail including small chances for bleeding, infection, and injury to organs that are located in the vicinity of the uterus.  We will schedule her for vaginal hysterectomy with cystoscopy following the case.  She has already had salpingectomy for sterilization.  Would remove any tubal fragments found at the time of her hysterectomy.  We will schedule this as an outpatient procedure if possible.    Phani Good MD FACOG  8:47 AM 10/12/2021

## 2021-11-10 ENCOUNTER — ANESTHESIA EVENT (OUTPATIENT)
Dept: SURGERY | Facility: OTHER | Age: 31
End: 2021-11-10
Payer: COMMERCIAL

## 2021-11-11 ENCOUNTER — ANESTHESIA (OUTPATIENT)
Dept: SURGERY | Facility: OTHER | Age: 31
End: 2021-11-11
Payer: COMMERCIAL

## 2021-11-11 ENCOUNTER — HOSPITAL ENCOUNTER (OUTPATIENT)
Facility: OTHER | Age: 31
Discharge: HOME | End: 2021-11-11
Attending: OBSTETRICS & GYNECOLOGY | Admitting: OBSTETRICS & GYNECOLOGY
Payer: COMMERCIAL

## 2021-11-11 VITALS
WEIGHT: 227 LBS | DIASTOLIC BLOOD PRESSURE: 78 MMHG | RESPIRATION RATE: 16 BRPM | OXYGEN SATURATION: 97 % | TEMPERATURE: 97 F | SYSTOLIC BLOOD PRESSURE: 115 MMHG | HEIGHT: 66 IN | HEART RATE: 87 BPM | BODY MASS INDEX: 36.48 KG/M2

## 2021-11-11 DIAGNOSIS — N92.1 MENORRHAGIA WITH IRREGULAR CYCLE: ICD-10-CM

## 2021-11-11 DIAGNOSIS — G89.18 POST-OP PAIN: Primary | ICD-10-CM

## 2021-11-11 LAB
ABO/RH(D): NORMAL
ANTIBODY SCREEN: NEGATIVE
BASOPHILS # BLD AUTO: 0.1 10E3/UL (ref 0–0.2)
BASOPHILS NFR BLD AUTO: 1 %
EOSINOPHIL # BLD AUTO: 0.4 10E3/UL (ref 0–0.7)
EOSINOPHIL NFR BLD AUTO: 5 %
ERYTHROCYTE [DISTWIDTH] IN BLOOD BY AUTOMATED COUNT: 17.7 % (ref 10–15)
HCG UR QL: NEGATIVE
HCT VFR BLD AUTO: 35.4 % (ref 35–47)
HGB BLD-MCNC: 10.6 G/DL (ref 11.7–15.7)
IMM GRANULOCYTES # BLD: 0 10E3/UL
IMM GRANULOCYTES NFR BLD: 0 %
LYMPHOCYTES # BLD AUTO: 3 10E3/UL (ref 0.8–5.3)
LYMPHOCYTES NFR BLD AUTO: 34 %
MCH RBC QN AUTO: 20.3 PG (ref 26.5–33)
MCHC RBC AUTO-ENTMCNC: 29.9 G/DL (ref 31.5–36.5)
MCV RBC AUTO: 68 FL (ref 78–100)
MONOCYTES # BLD AUTO: 0.6 10E3/UL (ref 0–1.3)
MONOCYTES NFR BLD AUTO: 6 %
NEUTROPHILS # BLD AUTO: 4.8 10E3/UL (ref 1.6–8.3)
NEUTROPHILS NFR BLD AUTO: 54 %
NRBC # BLD AUTO: 0 10E3/UL
NRBC BLD AUTO-RTO: 0 /100
PLATELET # BLD AUTO: 422 10E3/UL (ref 150–450)
RBC # BLD AUTO: 5.23 10E6/UL (ref 3.8–5.2)
SPECIMEN EXPIRATION DATE: NORMAL
WBC # BLD AUTO: 8.8 10E3/UL (ref 4–11)

## 2021-11-11 PROCEDURE — 81025 URINE PREGNANCY TEST: CPT | Performed by: OBSTETRICS & GYNECOLOGY

## 2021-11-11 PROCEDURE — 58260 VAGINAL HYSTERECTOMY: CPT | Performed by: NURSE ANESTHETIST, CERTIFIED REGISTERED

## 2021-11-11 PROCEDURE — 999N000141 HC STATISTIC PRE-PROCEDURE NURSING ASSESSMENT: Performed by: OBSTETRICS & GYNECOLOGY

## 2021-11-11 PROCEDURE — 250N000013 HC RX MED GY IP 250 OP 250 PS 637: Performed by: OBSTETRICS & GYNECOLOGY

## 2021-11-11 PROCEDURE — 58260 VAGINAL HYSTERECTOMY: CPT | Performed by: OBSTETRICS & GYNECOLOGY

## 2021-11-11 PROCEDURE — 250N000009 HC RX 250: Performed by: OBSTETRICS & GYNECOLOGY

## 2021-11-11 PROCEDURE — 250N000009 HC RX 250: Performed by: NURSE ANESTHETIST, CERTIFIED REGISTERED

## 2021-11-11 PROCEDURE — 710N000010 HC RECOVERY PHASE 1, LEVEL 2, PER MIN: Performed by: OBSTETRICS & GYNECOLOGY

## 2021-11-11 PROCEDURE — 250N000011 HC RX IP 250 OP 636: Performed by: OBSTETRICS & GYNECOLOGY

## 2021-11-11 PROCEDURE — 85025 COMPLETE CBC W/AUTO DIFF WBC: CPT | Performed by: OBSTETRICS & GYNECOLOGY

## 2021-11-11 PROCEDURE — 360N000076 HC SURGERY LEVEL 3, PER MIN: Performed by: OBSTETRICS & GYNECOLOGY

## 2021-11-11 PROCEDURE — 86900 BLOOD TYPING SEROLOGIC ABO: CPT | Performed by: OBSTETRICS & GYNECOLOGY

## 2021-11-11 PROCEDURE — 250N000011 HC RX IP 250 OP 636: Performed by: NURSE ANESTHETIST, CERTIFIED REGISTERED

## 2021-11-11 PROCEDURE — 258N000001 HC RX 258: Performed by: OBSTETRICS & GYNECOLOGY

## 2021-11-11 PROCEDURE — 258N000003 HC RX IP 258 OP 636: Performed by: NURSE ANESTHETIST, CERTIFIED REGISTERED

## 2021-11-11 PROCEDURE — 370N000017 HC ANESTHESIA TECHNICAL FEE, PER MIN: Performed by: OBSTETRICS & GYNECOLOGY

## 2021-11-11 PROCEDURE — 36415 COLL VENOUS BLD VENIPUNCTURE: CPT | Performed by: OBSTETRICS & GYNECOLOGY

## 2021-11-11 PROCEDURE — 88307 TISSUE EXAM BY PATHOLOGIST: CPT

## 2021-11-11 PROCEDURE — 272N000001 HC OR GENERAL SUPPLY STERILE: Performed by: OBSTETRICS & GYNECOLOGY

## 2021-11-11 RX ORDER — ONDANSETRON 4 MG/1
4 TABLET, ORALLY DISINTEGRATING ORAL EVERY 30 MIN PRN
Status: DISCONTINUED | OUTPATIENT
Start: 2021-11-11 | End: 2021-11-11 | Stop reason: HOSPADM

## 2021-11-11 RX ORDER — IBUPROFEN 400 MG/1
800 TABLET, FILM COATED ORAL ONCE
Status: DISCONTINUED | OUTPATIENT
Start: 2021-11-11 | End: 2021-11-11 | Stop reason: DRUGHIGH

## 2021-11-11 RX ORDER — OXYCODONE HYDROCHLORIDE 5 MG/1
5 TABLET ORAL EVERY 4 HOURS PRN
Status: DISCONTINUED | OUTPATIENT
Start: 2021-11-11 | End: 2021-11-11 | Stop reason: HOSPADM

## 2021-11-11 RX ORDER — DEXAMETHASONE SODIUM PHOSPHATE 4 MG/ML
INJECTION, SOLUTION INTRA-ARTICULAR; INTRALESIONAL; INTRAMUSCULAR; INTRAVENOUS; SOFT TISSUE PRN
Status: DISCONTINUED | OUTPATIENT
Start: 2021-11-11 | End: 2021-11-11

## 2021-11-11 RX ORDER — BUPIVACAINE HYDROCHLORIDE 7.5 MG/ML
INJECTION, SOLUTION INTRASPINAL PRN
Status: DISCONTINUED | OUTPATIENT
Start: 2021-11-11 | End: 2021-11-11

## 2021-11-11 RX ORDER — ACETAMINOPHEN 325 MG/1
975 TABLET ORAL ONCE
Status: COMPLETED | OUTPATIENT
Start: 2021-11-11 | End: 2021-11-11

## 2021-11-11 RX ORDER — LIDOCAINE 40 MG/G
CREAM TOPICAL
Status: DISCONTINUED | OUTPATIENT
Start: 2021-11-11 | End: 2021-11-11 | Stop reason: HOSPADM

## 2021-11-11 RX ORDER — SODIUM CHLORIDE, SODIUM LACTATE, POTASSIUM CHLORIDE, CALCIUM CHLORIDE 600; 310; 30; 20 MG/100ML; MG/100ML; MG/100ML; MG/100ML
INJECTION, SOLUTION INTRAVENOUS CONTINUOUS
Status: DISCONTINUED | OUTPATIENT
Start: 2021-11-11 | End: 2021-11-11 | Stop reason: HOSPADM

## 2021-11-11 RX ORDER — PHENAZOPYRIDINE HYDROCHLORIDE 100 MG/1
200 TABLET, FILM COATED ORAL ONCE
Status: COMPLETED | OUTPATIENT
Start: 2021-11-11 | End: 2021-11-11

## 2021-11-11 RX ORDER — FENTANYL CITRATE 50 UG/ML
50 INJECTION, SOLUTION INTRAMUSCULAR; INTRAVENOUS EVERY 5 MIN PRN
Status: DISCONTINUED | OUTPATIENT
Start: 2021-11-11 | End: 2021-11-11 | Stop reason: HOSPADM

## 2021-11-11 RX ORDER — LIDOCAINE HYDROCHLORIDE 10 MG/ML
INJECTION, SOLUTION INFILTRATION; PERINEURAL PRN
Status: DISCONTINUED | OUTPATIENT
Start: 2021-11-11 | End: 2021-11-11

## 2021-11-11 RX ORDER — IBUPROFEN 800 MG/1
800 TABLET, FILM COATED ORAL EVERY 6 HOURS PRN
Qty: 30 TABLET | Refills: 0 | Status: SHIPPED | OUTPATIENT
Start: 2021-11-11

## 2021-11-11 RX ORDER — ONDANSETRON 2 MG/ML
4 INJECTION INTRAMUSCULAR; INTRAVENOUS EVERY 30 MIN PRN
Status: DISCONTINUED | OUTPATIENT
Start: 2021-11-11 | End: 2021-11-11 | Stop reason: HOSPADM

## 2021-11-11 RX ORDER — FENTANYL CITRATE 50 UG/ML
INJECTION, SOLUTION INTRAMUSCULAR; INTRAVENOUS PRN
Status: DISCONTINUED | OUTPATIENT
Start: 2021-11-11 | End: 2021-11-11

## 2021-11-11 RX ORDER — ONDANSETRON 2 MG/ML
INJECTION INTRAMUSCULAR; INTRAVENOUS PRN
Status: DISCONTINUED | OUTPATIENT
Start: 2021-11-11 | End: 2021-11-11

## 2021-11-11 RX ORDER — HYDROMORPHONE HYDROCHLORIDE 1 MG/ML
0.5 INJECTION, SOLUTION INTRAMUSCULAR; INTRAVENOUS; SUBCUTANEOUS ONCE
Status: COMPLETED | OUTPATIENT
Start: 2021-11-11 | End: 2021-11-11

## 2021-11-11 RX ORDER — LIDOCAINE HYDROCHLORIDE AND EPINEPHRINE 10; 10 MG/ML; UG/ML
INJECTION, SOLUTION INFILTRATION; PERINEURAL PRN
Status: DISCONTINUED | OUTPATIENT
Start: 2021-11-11 | End: 2021-11-11 | Stop reason: HOSPADM

## 2021-11-11 RX ORDER — AMOXICILLIN 250 MG
1-2 CAPSULE ORAL 2 TIMES DAILY
Qty: 30 TABLET | Refills: 0 | Status: SHIPPED | OUTPATIENT
Start: 2021-11-11 | End: 2021-11-24

## 2021-11-11 RX ORDER — NALOXONE HYDROCHLORIDE 0.4 MG/ML
0.2 INJECTION, SOLUTION INTRAMUSCULAR; INTRAVENOUS; SUBCUTANEOUS
Status: DISCONTINUED | OUTPATIENT
Start: 2021-11-11 | End: 2021-11-11 | Stop reason: HOSPADM

## 2021-11-11 RX ORDER — HYDROMORPHONE HYDROCHLORIDE 1 MG/ML
0.4 INJECTION, SOLUTION INTRAMUSCULAR; INTRAVENOUS; SUBCUTANEOUS EVERY 5 MIN PRN
Status: DISCONTINUED | OUTPATIENT
Start: 2021-11-11 | End: 2021-11-11 | Stop reason: HOSPADM

## 2021-11-11 RX ORDER — OXYCODONE HYDROCHLORIDE 5 MG/1
5-10 TABLET ORAL EVERY 4 HOURS PRN
Qty: 10 TABLET | Refills: 0 | Status: SHIPPED | OUTPATIENT
Start: 2021-11-11 | End: 2021-11-12

## 2021-11-11 RX ORDER — IBUPROFEN 400 MG/1
800 TABLET, FILM COATED ORAL ONCE
Status: DISCONTINUED | OUTPATIENT
Start: 2021-11-11 | End: 2021-11-11 | Stop reason: HOSPADM

## 2021-11-11 RX ORDER — CEFAZOLIN SODIUM 2 G/100ML
2 INJECTION, SOLUTION INTRAVENOUS
Status: COMPLETED | OUTPATIENT
Start: 2021-11-11 | End: 2021-11-11

## 2021-11-11 RX ORDER — OXYCODONE HYDROCHLORIDE 5 MG/1
5 TABLET ORAL
Status: COMPLETED | OUTPATIENT
Start: 2021-11-11 | End: 2021-11-11

## 2021-11-11 RX ORDER — NALOXONE HYDROCHLORIDE 0.4 MG/ML
0.4 INJECTION, SOLUTION INTRAMUSCULAR; INTRAVENOUS; SUBCUTANEOUS
Status: DISCONTINUED | OUTPATIENT
Start: 2021-11-11 | End: 2021-11-11 | Stop reason: HOSPADM

## 2021-11-11 RX ORDER — CEFAZOLIN SODIUM 2 G/100ML
2 INJECTION, SOLUTION INTRAVENOUS SEE ADMIN INSTRUCTIONS
Status: DISCONTINUED | OUTPATIENT
Start: 2021-11-11 | End: 2021-11-11 | Stop reason: HOSPADM

## 2021-11-11 RX ORDER — LIDOCAINE HYDROCHLORIDE 20 MG/ML
INJECTION, SOLUTION INFILTRATION; PERINEURAL PRN
Status: DISCONTINUED | OUTPATIENT
Start: 2021-11-11 | End: 2021-11-11

## 2021-11-11 RX ORDER — PROPOFOL 10 MG/ML
INJECTION, EMULSION INTRAVENOUS CONTINUOUS PRN
Status: DISCONTINUED | OUTPATIENT
Start: 2021-11-11 | End: 2021-11-11

## 2021-11-11 RX ORDER — OXYCODONE HYDROCHLORIDE 5 MG/1
5-10 TABLET ORAL EVERY 6 HOURS PRN
Status: DISCONTINUED | OUTPATIENT
Start: 2021-11-11 | End: 2021-11-11 | Stop reason: HOSPADM

## 2021-11-11 RX ORDER — KETOROLAC TROMETHAMINE 30 MG/ML
INJECTION, SOLUTION INTRAMUSCULAR; INTRAVENOUS PRN
Status: DISCONTINUED | OUTPATIENT
Start: 2021-11-11 | End: 2021-11-11

## 2021-11-11 RX ADMIN — OXYCODONE HYDROCHLORIDE 10 MG: 5 TABLET ORAL at 14:35

## 2021-11-11 RX ADMIN — FENTANYL CITRATE 25 MCG: 50 INJECTION, SOLUTION INTRAMUSCULAR; INTRAVENOUS at 07:31

## 2021-11-11 RX ADMIN — LIDOCAINE HYDROCHLORIDE 60 MG: 20 INJECTION, SOLUTION INFILTRATION; PERINEURAL at 07:39

## 2021-11-11 RX ADMIN — SODIUM CHLORIDE, SODIUM LACTATE, POTASSIUM CHLORIDE, AND CALCIUM CHLORIDE: 600; 310; 30; 20 INJECTION, SOLUTION INTRAVENOUS at 08:37

## 2021-11-11 RX ADMIN — ONDANSETRON HYDROCHLORIDE 4 MG: 2 SOLUTION INTRAMUSCULAR; INTRAVENOUS at 07:31

## 2021-11-11 RX ADMIN — ACETAMINOPHEN 975 MG: 325 TABLET, FILM COATED ORAL at 14:34

## 2021-11-11 RX ADMIN — HYDROMORPHONE HYDROCHLORIDE 0.5 MG: 1 INJECTION, SOLUTION INTRAMUSCULAR; INTRAVENOUS; SUBCUTANEOUS at 12:05

## 2021-11-11 RX ADMIN — LIDOCAINE HYDROCHLORIDE 1 ML: 10 INJECTION, SOLUTION INFILTRATION; PERINEURAL at 07:36

## 2021-11-11 RX ADMIN — CEFAZOLIN SODIUM 2 G: 2 INJECTION, SOLUTION INTRAVENOUS at 07:31

## 2021-11-11 RX ADMIN — ACETAMINOPHEN 975 MG: 325 TABLET, FILM COATED ORAL at 06:52

## 2021-11-11 RX ADMIN — FENTANYL CITRATE 50 MCG: 50 INJECTION INTRAMUSCULAR; INTRAVENOUS at 09:27

## 2021-11-11 RX ADMIN — BUPIVACAINE HYDROCHLORIDE 2 ML: 7.5 INJECTION, SOLUTION INTRASPINAL at 07:37

## 2021-11-11 RX ADMIN — PROPOFOL 100 MCG/KG/MIN: 10 INJECTION, EMULSION INTRAVENOUS at 07:39

## 2021-11-11 RX ADMIN — SODIUM CHLORIDE, SODIUM LACTATE, POTASSIUM CHLORIDE, AND CALCIUM CHLORIDE 100 ML/HR: 600; 310; 30; 20 INJECTION, SOLUTION INTRAVENOUS at 07:00

## 2021-11-11 RX ADMIN — KETOROLAC TROMETHAMINE 30 MG: 30 INJECTION, SOLUTION INTRAMUSCULAR at 07:43

## 2021-11-11 RX ADMIN — FENTANYL CITRATE 50 MCG: 50 INJECTION INTRAMUSCULAR; INTRAVENOUS at 09:22

## 2021-11-11 RX ADMIN — PHENAZOPYRIDINE HYDROCHLORIDE 200 MG: 100 TABLET, FILM COATED ORAL at 06:52

## 2021-11-11 RX ADMIN — HYDROMORPHONE HYDROCHLORIDE 0.4 MG: 1 INJECTION, SOLUTION INTRAMUSCULAR; INTRAVENOUS; SUBCUTANEOUS at 09:41

## 2021-11-11 RX ADMIN — MIDAZOLAM HYDROCHLORIDE 2 MG: 1 INJECTION, SOLUTION INTRAMUSCULAR; INTRAVENOUS at 07:31

## 2021-11-11 RX ADMIN — DEXAMETHASONE SODIUM PHOSPHATE 4 MG: 4 INJECTION, SOLUTION INTRAMUSCULAR; INTRAVENOUS at 07:39

## 2021-11-11 RX ADMIN — OXYCODONE HYDROCHLORIDE 5 MG: 5 TABLET ORAL at 11:09

## 2021-11-11 ASSESSMENT — ACTIVITIES OF DAILY LIVING (ADL)
CONCENTRATING,_REMEMBERING_OR_MAKING_DECISIONS_DIFFICULTY: NO
WEAR_GLASSES_OR_BLIND: NO
WALKING_OR_CLIMBING_STAIRS_DIFFICULTY: NO
TOILETING_ISSUES: NO
DIFFICULTY_COMMUNICATING: NO
HEARING_DIFFICULTY_OR_DEAF: NO
DIFFICULTY_EATING/SWALLOWING: NO
FALL_HISTORY_WITHIN_LAST_SIX_MONTHS: NO
DRESSING/BATHING_DIFFICULTY: NO
DOING_ERRANDS_INDEPENDENTLY_DIFFICULTY: NO

## 2021-11-11 ASSESSMENT — MIFFLIN-ST. JEOR: SCORE: 1776.42

## 2021-11-11 NOTE — PROGRESS NOTES
NSG DISCHARGE NOTE    Patient discharged to home at 4:56 PM via wheel chair. Accompanied by spouse and staff. Discharge instructions reviewed with patient and spouse, opportunity offered to ask questions. Prescriptions sent to patients preferred pharmacy. All belongings sent with patient. Educational handouts regarding vaginal hysterectomy reviewed with pt. VSS. IV removed.     Екатерина Nazario RN

## 2021-11-11 NOTE — ANESTHESIA PROCEDURE NOTES
Intrathecal injection Procedure Note    Pre-Procedure   Staff -        CRNA: Saul Mann APRN CRNA       Performed By: CRNA       Location: OR       Procedure Start/Stop Times: 11/11/2021 7:34 AM and 11/11/2021 7:37 AM       Pre-Anesthestic Checklist: patient identified, IV checked, risks and benefits discussed, informed consent, monitors and equipment checked, pre-op evaluation, at physician/surgeon's request and post-op pain management  Timeout:       Correct Patient: Yes        Correct Procedure: Yes        Correct Site: Yes        Correct Position: Yes   Procedure Documentation  Procedure: intrathecal injection       Diagnosis: Hysterectomy       Patient Position: sitting       Patient Prep/Sterile Barriers: sterile gloves, mask       Skin prep: Chloraprep       Insertion Site: L3-4. (midline approach).       Needle Gauge: 25.        Needle Length (Inches): 3.5        Spinal Needle Type: Pencan       Introducer used       Introducer: 20 G       # of attempts: 1 and  # of redirects:  0    Assessment/Narrative         Paresthesias: No.       CSF fluid: clear.      Opening pressure was cmH2O while  Sitting.

## 2021-11-11 NOTE — ANESTHESIA POSTPROCEDURE EVALUATION
Patient: Tasia Melgar    Procedure: Procedure(s):  HYSTERECTOMY, VAGINAL, CYSTOSCOPY       Diagnosis:Menorrhagia with irregular cycle [N92.1]  Diagnosis Additional Information: No value filed.    Anesthesia Type:  Spinal    Note:  Disposition: Admission   Postop Pain Control: Uneventful            Sign Out: Well controlled pain   PONV: No   Neuro/Psych: Uneventful            Sign Out: Acceptable/Baseline neuro status   Airway/Respiratory: Uneventful            Sign Out: Acceptable/Baseline resp. status   CV/Hemodynamics: Uneventful            Sign Out: Acceptable CV status; No obvious hypovolemia; No obvious fluid overload   Other NRE: NONE   DID A NON-ROUTINE EVENT OCCUR? No           Last vitals:  Vitals Value Taken Time   /73 11/11/21 0945   Temp 97  F (36.1  C) 11/11/21 0940   Pulse 91 11/11/21 0946   Resp 14 11/11/21 0946   SpO2 96 % 11/11/21 0946   Vitals shown include unvalidated device data.    Electronically Signed By: GO MARTINEZ CRNA  November 11, 2021  9:49 AM

## 2021-11-11 NOTE — ANESTHESIA PREPROCEDURE EVALUATION
Anesthesia Pre-Procedure Evaluation    Patient: Tasia Melgar   MRN: [id number] : 1990        Preoperative Diagnosis: Menorrhagia with irregular cycle [N92.1]    Procedure : Procedure(s):  HYSTERECTOMY, VAGINAL, POSSIBLE BILATERAL SALPINGECTOMY, CYSTOSCOPY          Past Medical History:   Diagnosis Date     No pertinent past medical history       Past Surgical History:   Procedure Laterality Date     LAPAROTOMY MINI, TUBAL LIGATION (POST PARTUM), COMBINED Bilateral 2021    Procedure: LIGATION, FALLOPIAN TUBE, POSTPARTUM;  Surgeon: Swati Randle MD;  Location:  OR     OTHER SURGICAL HISTORY      [place] [id number],NO PAST SURGERIES      No Known Allergies   Social History     Tobacco Use     Smoking status: Never Smoker     Smokeless tobacco: Never Used   Substance Use Topics     Alcohol use: No     Alcohol/week: 0.0 standard drinks      Wt Readings from Last 1 Encounters:   21 103 kg (227 lb)        Anesthesia Evaluation   Pt has had prior anesthetic. Type: General and Regional.        ROS/MED HX  ENT/Pulmonary:  - neg pulmonary ROS     Neurologic:  - neg neurologic ROS     Cardiovascular:  - neg cardiovascular ROS     METS/Exercise Tolerance: >4 METS    Hematologic:     (+) anemia,     Musculoskeletal:  - neg musculoskeletal ROS     GI/Hepatic:  - neg GI/hepatic ROS     Renal/Genitourinary:  - neg Renal ROS     Endo:     (+) Obesity,     Psychiatric/Substance Use:  - neg psychiatric ROS     Infectious Disease:  - neg infectious disease ROS     Malignancy:  - neg malignancy ROS     Other:  - neg other ROS          Physical Exam    Airway        Mallampati: II   TM distance: > 3 FB   Neck ROM: full   Mouth opening: > 3 cm    Respiratory Devices and Support         Dental  no notable dental history         Cardiovascular   cardiovascular exam normal          Pulmonary   pulmonary exam normal                OUTSIDE LABS:  CBC:   Lab Results   Component Value Date    WBC 8.8 2021    WBC 9.6  06/28/2021    HGB 10.6 (L) 11/11/2021    HGB 11.7 06/28/2021    HCT 35.4 11/11/2021    HCT 38.6 06/28/2021     11/11/2021     06/28/2021     BMP:   Lab Results   Component Value Date     06/18/2021     09/11/2017    POTASSIUM 3.9 06/18/2021    POTASSIUM 3.5 09/11/2017    CHLORIDE 103 06/18/2021    CHLORIDE 104 09/11/2017    CO2 25 06/18/2021    CO2 21 09/11/2017    BUN 13 06/18/2021    BUN 6 (L) 09/11/2017    CR 0.83 06/18/2021    CR 0.63 (L) 09/11/2017     (H) 06/18/2021    GLC 76 09/11/2017     COAGS:   Lab Results   Component Value Date    PTT 30 06/18/2021    INR 1.04 06/18/2021     POC:   Lab Results   Component Value Date    HCG Negative 11/11/2021     HEPATIC: No results found for: ALBUMIN, PROTTOTAL, ALT, AST, GGT, ALKPHOS, BILITOTAL, BILIDIRECT, TATI  OTHER:   Lab Results   Component Value Date    CASSIE 9.5 06/18/2021    MAG 1.9 09/11/2017       Anesthesia Plan    ASA Status:  2   NPO Status:  NPO Appropriate    Anesthesia Type: Spinal.              Consents    Anesthesia Plan(s) and associated risks, benefits, and realistic alternatives discussed. Questions answered and patient/representative(s) expressed understanding.     - Discussed with:  Patient      - Extended Intubation/Ventilatory Support Discussed: No.      - Patient is DNR/DNI Status: No    Use of blood products discussed: Yes.     - Discussed with: Patient.     - Consented: consented to blood products            Reason for refusal: other.     Postoperative Care    Pain management: IV analgesics, Multi-modal analgesia.   PONV prophylaxis: Ondansetron (or other 5HT-3), Dexamethasone or Solumedrol     Comments:    Pt agrees to general if needed.  TAP block for rescue pain control if needed in case of open procedure.  Pt states she has trouble at the dentist getting numb with fillings-question if there is some resistance to local anesthetics            GO MARTINEZ CRNA

## 2021-11-11 NOTE — ANESTHESIA CARE TRANSFER NOTE
Patient: Tasia Melgar    Procedure: Procedure(s):  HYSTERECTOMY, VAGINAL, CYSTOSCOPY       Diagnosis: Menorrhagia with irregular cycle [N92.1]  Diagnosis Additional Information: No value filed.    Anesthesia Type:   Spinal     Note:    Oropharynx: oropharynx clear of all foreign objects  Level of Consciousness: awake  Oxygen Supplementation: room air    Independent Airway: airway patency satisfactory and stable  Dentition: dentition unchanged  Vital Signs Stable: post-procedure vital signs reviewed and stable  Report to RN Given: handoff report given  Patient transferred to: PACU    Handoff Report: Identifed the Patient, Identified the Reponsible Provider, Reviewed the pertinent medical history, Discussed the surgical course, Reviewed Intra-OP anesthesia mangement and issues during anesthesia, Set expectations for post-procedure period and Allowed opportunity for questions and acknowledgement of understanding      Vitals:  Vitals Value Taken Time   BP     Temp     Pulse     Resp     SpO2         Electronically Signed By: GO Lazo CRNA  November 11, 2021  8:42 AM

## 2021-11-11 NOTE — OP NOTE
[facility] Gynecology Operative Note    Pre-operative diagnosis: Menorrhagia   Post-operative diagnosis: Same   Procedure: Vaginal hysterectomy  Cystoscopy   Surgeon: Phani Good MD   Assistant(s): None   Anesthesia: Spinal anesthesia and MAC (monitored anesthesia care)   Estimated blood loss: less than 50ml   Total IV fluids: (See anesthesia record)   Blood transfusion: No transfusion was given during surgery   Total urine output: (See anesthesia record)   Drains: None   Specimens: Uterus   Findings: Normal ovaries, s/p salpingectomy, normal size and shaped uterus   Complications: None   Condition: Stable   Procedure details:  After consent was obtained the patientwas taken to the OR suite and placed under general anesthetic.  She was prepped and draped in sterile fashion in dorsal lithotomy position in candy cane stirrups.  After appropriate timeout procedure, a weighted speculum was placed in the posterior vaginal fornix.  Right angle retractors were placed in the anterior and lateral vaginal fornices.  The cervix was grasped with Henrontin clamps on the anterior and posterior lips and the cervix was injected with 0.5% marcaine with dilute epinephrine circumfrentially.  The vaginal mucosa was sharply incised circumscribing the cervix.  The anterior vesicopubic fascia was sharply dissected, and the peritoneum identified and divided.  Intraabdominal position was confirmed.  The cul de sac was entered sharply posterior to the cervix in like manner.  The vaginal mucosa was bluntly swept away from the underlying ligaments.  The uterosacral ligament on the left was clamped, cauterized with ligasure and divided. In two successive bites the cardinal ligament complex was clamped, cauterized and divided in like manner with the ureter palpably free of thedissection.  The same process was performed on the right taking down the uterosacral and cardinal ligaments in 3 bites.  The uterus was inverted and  the right and left uteroovarian ligaments were clamped cauterized with ligasure and divided.  The uterus was removed and sent to Pathology. Hemostasis was excellent.    A pack was placed in the abdomen and both  ovaries appeared normal.  A Alex's culdeplasty stitch was placed plicating the posterior peritoneum and both uterosacral ligaments with the free ends being brought out through the vaginal mucosa. The pack was removed, all counts noted to be correct, and the culdeplasty stitch was tied.  The vaginal cuff was reapproximated with O Vicryl in an interrupted fashion.  Hemostasis was excellent. Cystoscopy was performed demonstrating normal bladder, urethra, and ureters with bilateral jets noted.     The patient was returned to the supine position and awakened from her anesthetic.  She was taken to the PACU in stable condition.  There were no complications. Pathology included uterus.  EBL less than 50 ml

## 2021-11-11 NOTE — PROGRESS NOTES
To [facility] room 411 from PACU. Oriented to room, assessment done, VSS.  Pat at bedside. She offers no complaints at this time. Will continue to monitor.

## 2021-11-11 NOTE — PROGRESS NOTES
Up to bathroom was able to void and states that she feels like she emptied her bladder. Scanner shows 10 ml. Will continue to monitor.

## 2021-11-11 NOTE — OR NURSING
PACU Transfer Note    Tasia Melgar was transferred to [facility] via bed.  Equipment used for transport:  none.  Accompanied by:  rn  Prescriptions were: escribed to [facility]    PACU Respiratory Event Documentation     1) Episodes of Apnea greater than or equal to 10 seconds: no    2) Bradypnea - less than 8 breaths per minute: no    3) Pain score on 0 to 10 scale: 5 lower back    4) Pain-sedation mismatch (yes or no): no    5) Repeated 02 desaturation less than 90% (yes or no): no    Anesthesia notified? (yes or no): no    Any of the above events occuring repeatedly in separate 30 minute intervals may be considered recurrent PACU respiratory events.    Patient stable and meets phase 1 discharge criteria for transport from PACU.   report given Maia KOLE

## 2021-11-11 NOTE — PROGRESS NOTES
Up to bathroom unable to void. Bladder scanned for 800 ml plus. Dr. Good notified orders received.

## 2021-11-12 ENCOUNTER — MYC MEDICAL ADVICE (OUTPATIENT)
Dept: OBGYN | Facility: OTHER | Age: 31
End: 2021-11-12
Payer: COMMERCIAL

## 2021-11-12 DIAGNOSIS — G89.18 POST-OP PAIN: ICD-10-CM

## 2021-11-12 RX ORDER — OXYCODONE HYDROCHLORIDE 5 MG/1
5-10 TABLET ORAL EVERY 4 HOURS PRN
Qty: 10 TABLET | Refills: 0 | Status: SHIPPED | OUTPATIENT
Start: 2021-11-12 | End: 2021-11-24

## 2021-11-16 LAB
PATH REPORT.COMMENTS IMP SPEC: NORMAL
PATH REPORT.FINAL DX SPEC: NORMAL
PHOTO IMAGE: NORMAL

## 2021-11-24 ENCOUNTER — OFFICE VISIT (OUTPATIENT)
Dept: OBGYN | Facility: OTHER | Age: 31
End: 2021-11-24
Attending: OBSTETRICS & GYNECOLOGY
Payer: COMMERCIAL

## 2021-11-24 VITALS
SYSTOLIC BLOOD PRESSURE: 118 MMHG | HEART RATE: 116 BPM | BODY MASS INDEX: 36.8 KG/M2 | DIASTOLIC BLOOD PRESSURE: 76 MMHG | WEIGHT: 228 LBS

## 2021-11-24 DIAGNOSIS — Z98.890 POSTOPERATIVE STATE: Primary | ICD-10-CM

## 2021-11-24 PROCEDURE — 99024 POSTOP FOLLOW-UP VISIT: CPT | Performed by: OBSTETRICS & GYNECOLOGY

## 2021-11-24 ASSESSMENT — PAIN SCALES - GENERAL: PAINLEVEL: NO PAIN (0)

## 2021-11-24 NOTE — PROGRESS NOTES
Tasia Melgar presents todayfor a postop checkup at 2 week(s) after vaginal hysterectomy    S: Bowels and bladder are functioning normally, no abnormal bleeding or pain. No concerns about the incision(s).    Current Outpatient Medications   Medication     ibuprofen (ADVIL/MOTRIN) 800 MG tablet     No current facility-administered medications for this visit.     No Known Allergies  Past Medical History:   Diagnosis Date     No pertinent past medical history      Past Surgical History:   Procedure Laterality Date     HYSTERECTOMY VAGINAL N/A 11/11/2021    Procedure: HYSTERECTOMY, VAGINAL, CYSTOSCOPY;  Surgeon: Phani Good MD;  Location:  OR     LAPAROTOMY MINI, TUBAL LIGATION (POST PARTUM), COMBINED Bilateral 1/14/2021    Procedure: LIGATION, FALLOPIAN TUBE, POSTPARTUM;  Surgeon: Swati Randle MD;  Location:  OR     OTHER SURGICAL HISTORY      [id number],NO PAST SURGERIES       COMPLETE REVIEW OF SYSTEMS: Neg except as above        O: /76 (BP Location: Right arm, Patient Position: Sitting, Cuff Size: Adult Large)   Pulse 116   Wt 103.4 kg (228 lb)   LMP 11/09/2021   Breastfeeding No   BMI 36.80 kg/m   Body mass index is 36.8 kg/m .    EXAM:  NAD      I/P:  Stable postop    Recheck in a month  Restrictions reiterated  Pathology reviewed    Phani Good MD FACOG  2:11 PM 11/24/2021

## 2021-11-24 NOTE — NURSING NOTE
"  Chief Complaint   Patient presents with     Surgical Followup     tvh, cysto     2 weeks s/p TVH and Cysto. No pain. Bowels and bladder functioning normally.    Initial /76 (BP Location: Right arm, Patient Position: Sitting, Cuff Size: Adult Large)   Pulse 116   Wt 103.4 kg (228 lb)   LMP 11/09/2021   Breastfeeding No   BMI 36.80 kg/m   Estimated body mass index is 36.8 kg/m  as calculated from the following:    Height as of 11/11/21: 1.676 m (5' 6\").    Weight as of this encounter: 103.4 kg (228 lb).  Medication Reconciliation: complete    Susan Marroquin RN  "

## 2022-01-05 ENCOUNTER — TELEPHONE (OUTPATIENT)
Dept: BEHAVIORAL HEALTH | Facility: CLINIC | Age: 32
End: 2022-01-05
Payer: COMMERCIAL

## 2022-01-05 ASSESSMENT — ANXIETY QUESTIONNAIRES
7. FEELING AFRAID AS IF SOMETHING AWFUL MIGHT HAPPEN: MORE THAN HALF THE DAYS
GAD7 TOTAL SCORE: 20
5. BEING SO RESTLESS THAT IT IS HARD TO SIT STILL: NEARLY EVERY DAY
4. TROUBLE RELAXING: NEARLY EVERY DAY
6. BECOMING EASILY ANNOYED OR IRRITABLE: NEARLY EVERY DAY
GAD7 TOTAL SCORE: 20
3. WORRYING TOO MUCH ABOUT DIFFERENT THINGS: NEARLY EVERY DAY
2. NOT BEING ABLE TO STOP OR CONTROL WORRYING: NEARLY EVERY DAY
7. FEELING AFRAID AS IF SOMETHING AWFUL MIGHT HAPPEN: MORE THAN HALF THE DAYS
GAD7 TOTAL SCORE: 20
1. FEELING NERVOUS, ANXIOUS, OR ON EDGE: NEARLY EVERY DAY

## 2022-01-06 ASSESSMENT — ANXIETY QUESTIONNAIRES: GAD7 TOTAL SCORE: 20

## 2022-01-07 ENCOUNTER — HOSPITAL ENCOUNTER (OUTPATIENT)
Dept: BEHAVIORAL HEALTH | Facility: CLINIC | Age: 32
Discharge: HOME | End: 2022-01-07
Attending: FAMILY MEDICINE | Admitting: FAMILY MEDICINE
Payer: COMMERCIAL

## 2022-01-07 PROCEDURE — 90791 PSYCH DIAGNOSTIC EVALUATION: CPT | Mod: GT,95 | Performed by: COUNSELOR

## 2022-01-07 ASSESSMENT — COLUMBIA-SUICIDE SEVERITY RATING SCALE - C-SSRS
ATTEMPT PAST THREE MONTHS: NO
5. HAVE YOU STARTED TO WORK OUT OR WORKED OUT THE DETAILS OF HOW TO KILL YOURSELF? DO YOU INTEND TO CARRY OUT THIS PLAN?: NO
6. HAVE YOU EVER DONE ANYTHING, STARTED TO DO ANYTHING, OR PREPARED TO DO ANYTHING TO END YOUR LIFE?: NO
2. HAVE YOU ACTUALLY HAD ANY THOUGHTS OF KILLING YOURSELF?: NO
6. HAVE YOU EVER DONE ANYTHING, STARTED TO DO ANYTHING, OR PREPARED TO DO ANYTHING TO END YOUR LIFE?: NO
TOTAL  NUMBER OF INTERRUPTED ATTEMPTS PAST 3 MONTHS: NO
1. IN THE PAST MONTH, HAVE YOU WISHED YOU WERE DEAD OR WISHED YOU COULD GO TO SLEEP AND NOT WAKE UP?: NO
TOTAL  NUMBER OF INTERRUPTED ATTEMPTS LIFETIME: NO
5. HAVE YOU STARTED TO WORK OUT OR WORKED OUT THE DETAILS OF HOW TO KILL YOURSELF? DO YOU INTEND TO CARRY OUT THIS PLAN?: NO
ATTEMPT LIFETIME: NO
1. IN THE PAST MONTH, HAVE YOU WISHED YOU WERE DEAD OR WISHED YOU COULD GO TO SLEEP AND NOT WAKE UP?: NO
TOTAL  NUMBER OF ABORTED OR SELF INTERRUPTED ATTEMPTS PAST LIFETIME: NO
4. HAVE YOU HAD THESE THOUGHTS AND HAD SOME INTENTION OF ACTING ON THEM?: NO
2. HAVE YOU ACTUALLY HAD ANY THOUGHTS OF KILLING YOURSELF LIFETIME?: NO
TOTAL  NUMBER OF ABORTED OR SELF INTERRUPTED ATTEMPTS PAST 3 MONTHS: NO
3. HAVE YOU BEEN THINKING ABOUT HOW YOU MIGHT KILL YOURSELF?: NO
4. HAVE YOU HAD THESE THOUGHTS AND HAD SOME INTENTION OF ACTING ON THEM?: NO

## 2022-01-07 ASSESSMENT — PATIENT HEALTH QUESTIONNAIRE - PHQ9: SUM OF ALL RESPONSES TO PHQ QUESTIONS 1-9: 12

## 2022-01-07 NOTE — PATIENT INSTRUCTIONS
Provider: Angela Ryan  Date 01/19/202   Time: 1:00pm  Address: [address] [facility]  Phone: [phone]    Provider: Angela Ryan  Date: 01/25/2022  Time: 1:00pm  Address: [address] ([facility])  Phone: [phone]    Provider: Angela Ryan  Date: 02/02/22   Time: 2:00pm  Address: [address] ([facility])  Phone: [phone]    Provider: Angela Ryan  Date: 2/9/2022  Time: 2:00pm  Address: [address] ([facility])  Phone: [phone]
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
## 2022-03-08 NOTE — PROGRESS NOTES
Patient Information     Patient Name MRTasia Lopez [id number] Female 1990      Progress Notes by Swati Randle MD at 2018  2:30 PM     Author:  Swati Randle MD Service:  (none) Author Type:  Physician     Filed:  2018  3:28 PM Encounter Date:  2018 Status:  Signed     :  Swati Randle MD (Physician)            [facility]  Return OB Visit    S: Patient reports she has been feeling well. Reports episodes of shortness of breath while working (). She denies chest pain or palpitations with these episodes. Is able to do all home activities without shortness of breath, including cleaning and climbing a flight of stairs. Able to lay flat at night without symptoms. She denies cramping, contractions, vaginal bleeding, leaking fluid. She reports normal fetal movement. She has no other complaints.    O: /60 (Cuff Site: Right Arm, Position: Sitting, Cuff Size: Adult Regular)  Pulse (!) 108  Wt 90.5 kg (199 lb 8 oz)  LMP 2017 (Exact Date)  BMI 32.2 kg/m2  Gen: Well-appearing, NAD    Fundal Height:  33  FHR: 150    A/P:  Tasia Duarte is a 27 y.o.  at 31w5d by 8w6d US, here for return OB visit.  LSIL pap at new OB: needs repeat at one year  Dyspnea of pregnancy  Contraception: undecided, considering IUD vs vasectomy. Also has partner's two sons living at home with them. Written information provided  Labor pain management: does not want epidural. Encouraged patient to look into other options to help cope with pain of labor (ie medication, hypnobirthing, etc)  Plans breastfeeding: breast pump Rx 2018      PNC:   - Prenatal labs reviewed, Rh positive, Rubella immune, GCT 87  - Genetics: declined  - Imaging: dating US at 8w6d, normal anatomy at 19w4d  - Immunizations: declined flu. Tdap 2017   RTC 2 weeks    Swati Randle MD  OB/GYN  2018 3:24 PM             Not applicable as gestational age is greater than or equal to 34 weeks.

## 2023-07-24 ENCOUNTER — OFFICE VISIT (OUTPATIENT)
Dept: FAMILY MEDICINE | Facility: OTHER | Age: 33
End: 2023-07-24
Payer: COMMERCIAL

## 2023-07-24 VITALS
HEART RATE: 96 BPM | SYSTOLIC BLOOD PRESSURE: 110 MMHG | OXYGEN SATURATION: 97 % | DIASTOLIC BLOOD PRESSURE: 66 MMHG | HEIGHT: 66 IN | RESPIRATION RATE: 20 BRPM | BODY MASS INDEX: 33.43 KG/M2 | TEMPERATURE: 97.7 F | WEIGHT: 208 LBS

## 2023-07-24 DIAGNOSIS — K04.7 DENTAL INFECTION: Primary | ICD-10-CM

## 2023-07-24 PROCEDURE — 99213 OFFICE O/P EST LOW 20 MIN: CPT | Performed by: STUDENT IN AN ORGANIZED HEALTH CARE EDUCATION/TRAINING PROGRAM

## 2023-07-24 PROCEDURE — G0463 HOSPITAL OUTPT CLINIC VISIT: HCPCS | Performed by: STUDENT IN AN ORGANIZED HEALTH CARE EDUCATION/TRAINING PROGRAM

## 2023-07-24 ASSESSMENT — PAIN SCALES - GENERAL: PAINLEVEL: SEVERE PAIN (7)

## 2023-07-24 NOTE — NURSING NOTE
"Pt presents to  for pain in L side bottom of mouth x2 days. Pt called to get into dentist and they cannot see her for up to 1 mo. Pt took Tylenol and Ibuprofen between 1730/1800.    Chief Complaint   Patient presents with    Mouth Problem     L side - bottom       FOOD SECURITY SCREENING QUESTIONS  Hunger Vital Signs:  Within the past 12 months we worried whether our food would run out before we got money to buy more. Never  Within the past 12 months the food we bought just didn't last and we didn't have money to get more. Never  Becki Dearmon 7/24/2023 7:02 PM      Initial /66 (BP Location: Left arm, Patient Position: Sitting, Cuff Size: Adult Large)   Pulse 96   Temp 97.7  F (36.5  C) (Tympanic)   Resp 20   Ht 1.676 m (5' 6\")   Wt 94.3 kg (208 lb)   LMP 11/09/2021   SpO2 97%   BMI 33.57 kg/m   Estimated body mass index is 33.57 kg/m  as calculated from the following:    Height as of this encounter: 1.676 m (5' 6\").    Weight as of this encounter: 94.3 kg (208 lb).  Medication Reconciliation: complete    Becki Dearmon  "

## 2023-07-25 NOTE — PROGRESS NOTES
"  Assessment & Plan     (K04.7) Dental infection  (primary encounter diagnosis)    Comment: Most likely dental infection stemming from cracked tooth.  Also could be related to nerve pain from cracked tooth.  She does have follow-up with dentist in August.    Plan: amoxicillin-clavulanate (AUGMENTIN) 875-125 MG         tablet    Plan to treat with Augmentin twice a day for 10 days.  Follow-up with dentist.  Return to rapid clinic or go to ER if symptoms worsen or change.  She is agreeable.    Brittney Wagoner PA-C  [facility]    Subjective   Tasia is a 32 year old, presenting for the following health issues:  Mouth Problem (L side - bottom)        HPI     Patient presents today with lower left tooth pain as well as pain into the jaw.  States she chipped her tooth approximately 3 weeks ago and over the last few days he has developed left-sided jaw pain.  States pain does radiate to around by the ear.  States she has been using ibuprofen and Tylenol.  She has not noted any fevers.      Review of Systems   Constitutional, HEENT, cardiovascular, pulmonary, gi and gu systems are negative, except as otherwise noted.      Objective    /66 (BP Location: Left arm, Patient Position: Sitting, Cuff Size: Adult Large)   Pulse 96   Temp 97.7  F (36.5  C) (Tympanic)   Resp 20   Ht 1.676 m (5' 6\")   Wt 94.3 kg (208 lb)   LMP 11/09/2021   SpO2 97%   BMI 33.57 kg/m    Body mass index is 33.57 kg/m .    Physical Exam  Constitutional:       Appearance: Normal appearance.   HENT:      Nose: Nose normal.      Mouth/Throat:      Mouth: Mucous membranes are moist.      Pharynx: Oropharynx is clear.        Comments: Lower left molar with cracked/chipped near the blue marked area on the image with surrounding tenderness of the gumline, erythema of the gumline, tenderness with palpation under the jaw, no abnormality with the jawline with palpation  Cardiovascular:      Rate and Rhythm: Normal rate and " regular rhythm.   Pulmonary:      Effort: Pulmonary effort is normal.      Breath sounds: Normal breath sounds.   Neurological:      Mental Status: She is alert.

## 2023-07-25 NOTE — PATIENT INSTRUCTIONS
Dental Infection    Utilize ibuprofen 800 mg three times a day with food.  Tylenol 1000 mg every six hours as needed.    Augmentin BID x 10 days.    Follow up with dentist.    Return to rapid clinic or ER if symptoms worsen or change in the meantime.

## 2023-07-30 ENCOUNTER — HEALTH MAINTENANCE LETTER (OUTPATIENT)
Age: 33
End: 2023-07-30

## 2024-06-21 ENCOUNTER — OFFICE VISIT (OUTPATIENT)
Dept: FAMILY MEDICINE | Facility: OTHER | Age: 34
End: 2024-06-21
Attending: NURSE PRACTITIONER

## 2024-06-21 VITALS
DIASTOLIC BLOOD PRESSURE: 66 MMHG | WEIGHT: 212.5 LBS | HEART RATE: 81 BPM | OXYGEN SATURATION: 98 % | RESPIRATION RATE: 17 BRPM | TEMPERATURE: 98 F | SYSTOLIC BLOOD PRESSURE: 112 MMHG | HEIGHT: 67 IN | BODY MASS INDEX: 33.35 KG/M2

## 2024-06-21 DIAGNOSIS — R44.8 FACIAL PRESSURE: ICD-10-CM

## 2024-06-21 DIAGNOSIS — K04.7 DENTAL INFECTION: Primary | ICD-10-CM

## 2024-06-21 DIAGNOSIS — K06.8 PAIN IN GUMS: ICD-10-CM

## 2024-06-21 PROCEDURE — 99213 OFFICE O/P EST LOW 20 MIN: CPT | Performed by: NURSE PRACTITIONER

## 2024-06-21 RX ORDER — PREDNISONE 20 MG/1
20 TABLET ORAL DAILY
Qty: 3 TABLET | Refills: 0 | Status: SHIPPED | OUTPATIENT
Start: 2024-06-21 | End: 2024-06-24

## 2024-06-21 ASSESSMENT — PAIN SCALES - GENERAL: PAINLEVEL: SEVERE PAIN (6)

## 2024-06-21 NOTE — PROGRESS NOTES
ASSESSMENT/PLAN:     I have reviewed the nursing notes.  I have reviewed the findings, diagnosis, plan and need for follow up with the patient.        1. Facial pressure  - predniSONE (DELTASONE) 20 MG tablet; Take 1 tablet (20 mg) by mouth daily for 3 days  Dispense: 3 tablet; Refill: 0    2. Pain in gums  3. Dental infection  - amoxicillin-clavulanate (AUGMENTIN) 875-125 MG tablet; Take 1 tablet by mouth 2 times daily for 10 days  Dispense: 20 tablet; Refill: 0    Symptomatic treatment - Encouraged fluids, soft diet as tolerated, dental mouth rinse, etc  May use over-the-counter Tylenol or ibuprofen PRN    Discussed warning signs/symptoms indicative of need to f/u  Follow up if symptoms persist or worsen or concerns      I explained my diagnostic considerations and recommendations to the patient, who voiced understanding and agreement with the treatment plan. All questions were answered. We discussed potential side effects of any prescribed or recommended therapies, as well as expectations for response to treatments.    Alanis Handley NP  [facility]      SUBJECTIVE:   Tasia Melgar is a 33 year old female who presents to clinic today for the following health issues:  Gum pain    HPI  Gum pain and swelling the past 3 days.  Right sided temporal headache since yesterday and worsening today.  Today woke up with facial pressure and pain along with nasal drainage/congestion.  Minimal sore throat.  No fevers.  Decreased appetite due to pain with chewing.  No solids the past 3 days.  Drinking fluids.  Unable to get into dentist.    Taking Tylenol and Ibuprofen   Hx of dental infection a year ago and treated with Augmentin.      Past Medical History:   Diagnosis Date    No pertinent past medical history      Past Surgical History:   Procedure Laterality Date    HYSTERECTOMY VAGINAL N/A 11/11/2021    Procedure: HYSTERECTOMY, VAGINAL, CYSTOSCOPY;  Surgeon: Phani Good MD;  Location:  OR     "LAPAROTOMY MINI, TUBAL LIGATION (POST PARTUM), COMBINED Bilateral 1/14/2021    Procedure: LIGATION, FALLOPIAN TUBE, POSTPARTUM;  Surgeon: Swati Randle MD;  Location:  OR    OTHER SURGICAL HISTORY      [place] [id number],NO PAST SURGERIES     Social History     Tobacco Use    Smoking status: Never    Smokeless tobacco: Never   Substance Use Topics    Alcohol use: No     Alcohol/week: 0.0 standard drinks of alcohol     Current Outpatient Medications   Medication Sig Dispense Refill    ibuprofen (ADVIL/MOTRIN) 800 MG tablet Take 1 tablet (800 mg) by mouth every 6 hours as needed for other (mild and/or inflammatory pain) (Patient not taking: Reported on 6/21/2024) 30 tablet 0     No Known Allergies      Past medical history, past surgical history, current medications and allergies reviewed and accurate to the best of my knowledge.        OBJECTIVE:     /66   Pulse 81   Temp 98  F (36.7  C) (Tympanic)   Resp 17   Ht 1.689 m (5' 6.5\")   Wt 96.4 kg (212 lb 8 oz)   LMP 11/09/2021   SpO2 98%   BMI 33.78 kg/m    Body mass index is 33.78 kg/m .        Physical Exam  General Appearance: Well appearing adult female, appropriate appearance for age. No acute distress  Orophayrnx: moist mucous membranes, pharynx without erythema, tonsils without hypertrophy, tonsils without erythema, no tonsillar exudates, no palate petechiae, no post nasal drip seen, no trismus, voice clear.  Front of gums with some areas of swelling, no noted erythema or abscess; tender to palpation.  NO oral lesions noted.  Few broken teeth noted and tender on exam.  Sinuses:  Bilateral maxillary sinus tenderness to palpation   Nose:  No noted drainage or congestion   Neck: supple without adenopathy  Respiratory: normal chest wall and respirations.  Normal effort.  Clear to auscultation bilaterally, no wheezing, crackles or rhonchi.  No increased work of breathing.  No cough appreciated.  Cardiac: RRR with no murmurs  Musculoskeletal:  Equal movement " of bilateral upper extremities.  Equal movement of bilateral lower extremities.  Normal gait.    Dermatological: no rashes noted of exposed skin  Psychological: normal affect, alert, oriented, and pleasant.

## 2024-06-21 NOTE — NURSING NOTE
"Chief Complaint   Patient presents with    Mouth Problem     Gums feel inflamed       Patient here for gums feeling inflamed x3 days and headache and sinus pressure x1 day. She states it is hard to eat, drink, and sleep.     Initial /66   Pulse 81   Temp 98  F (36.7  C) (Tympanic)   Resp 17   Ht 1.689 m (5' 6.5\")   Wt 96.4 kg (212 lb 8 oz)   LMP 11/09/2021   SpO2 98%   BMI 33.78 kg/m   Estimated body mass index is 33.78 kg/m  as calculated from the following:    Height as of this encounter: 1.689 m (5' 6.5\").    Weight as of this encounter: 96.4 kg (212 lb 8 oz).  Medication Review: complete    The next two questions are to help us understand your food security.  If you are feeling you need any assistance in this area, we have resources available to support you today.          6/21/2024   SDOH- Food Insecurity   Within the past 12 months, did you worry that your food would run out before you got money to buy more? N   Within the past 12 months, did the food you bought just not last and you didn t have money to get more? N            Health Care Directive:  Patient does not have a Health Care Directive or Living Will: Discussed advance care planning with patient; however, patient declined at this time.    Samanta Martinez LPN      "

## 2024-09-21 ENCOUNTER — HEALTH MAINTENANCE LETTER (OUTPATIENT)
Age: 34
End: 2024-09-21

## (undated) DEVICE — SUCTION TIP YANKAUER W/O VENT BULBOUS TIP

## (undated) DEVICE — SLEEVE COMPRESSION SCD KNEE MED 74022

## (undated) DEVICE — SU VICRYL 0 UR-6 27" J603H

## (undated) DEVICE — Device

## (undated) DEVICE — SU MONOCRYL 3-0 PS-2 18" UND Y497G

## (undated) DEVICE — SOL WATER 1500ML

## (undated) DEVICE — SU VICRYL 0 CT-2 27" UND J270H

## (undated) DEVICE — DRAPE POUCH INSTRUMENT 1018

## (undated) DEVICE — SYR 10ML FINGER CONTROL W/O NDL 309695

## (undated) DEVICE — DECANTER VIAL 2006S

## (undated) DEVICE — GLOVE PROTEXIS POWDER FREE SMT 6.5  2D72PT65X

## (undated) DEVICE — PAD PERI INDIV WRAP 11" 2022A

## (undated) DEVICE — SUTURE 3-0 VICRYL TIES J910T

## (undated) DEVICE — PACK MAJOR LAPAROTOMY LF SBA15MLFCA

## (undated) DEVICE — GLOVE PROTEXIS POWDER FREE SMT 7.5  2D72PT75X

## (undated) DEVICE — PAD CHUX UNDERPAD 30X36" P3036C

## (undated) DEVICE — ESU HOLSTER PLASTIC DISP E2400

## (undated) DEVICE — COVER LIGHT HANDLE LT-F02

## (undated) DEVICE — GLOVE PROTEXIS BLUE W/NEU-THERA 6.5  2D73EB65

## (undated) DEVICE — KIT PATIENT POSITIONING PIGAZZI LATEX FREE 40580

## (undated) DEVICE — ESU LIGASURE IMPACT OPEN SEALER/DVDR CVD LG JAW LF4418

## (undated) DEVICE — PACK LITHOTOMY SBA15LIFCA

## (undated) DEVICE — GOWN XLG XLONG DISP LF DYNJP2302P

## (undated) DEVICE — DRAPE MAGNETIC 16X20" DYNJMDL1

## (undated) DEVICE — NDL SPINAL 20GA 3.5" 405182

## (undated) DEVICE — TUBING IRR TUR Y TYPE 2C4041

## (undated) DEVICE — PREP CHLORAPREP 26ML TINTED ORANGE  260815

## (undated) DEVICE — ADH SKIN CLOSURE PREMIERPRO EXOFIN 1.0ML 3470

## (undated) DEVICE — ESU PENCIL SMOKE EVAC W/ROCKER SWITCH 0703-047-000

## (undated) DEVICE — BLADE KNIFE SURG 10 SAFETY 373910

## (undated) DEVICE — SUTURE 0 VICRYL MO-4 HGS-21 VIOLET 27IN

## (undated) DEVICE — ESU GROUND PAD ADULT W/CORD E7507

## (undated) DEVICE — PANTIES MESH LG/XLG 2PK 706M2

## (undated) DEVICE — DRAPE SHEET REV FOLD 3/4 9349

## (undated) DEVICE — STRAP STIRRUP W/O SLIP 30187-020

## (undated) RX ORDER — CEFAZOLIN SODIUM 2 G/100ML
INJECTION, SOLUTION INTRAVENOUS
Status: DISPENSED
Start: 2021-11-11

## (undated) RX ORDER — ONDANSETRON 2 MG/ML
INJECTION INTRAMUSCULAR; INTRAVENOUS
Status: DISPENSED
Start: 2021-01-14

## (undated) RX ORDER — NEOSTIGMINE METHYLSULFATE 0.5 MG/ML
INJECTION INTRAVENOUS
Status: DISPENSED
Start: 2021-01-14

## (undated) RX ORDER — ACETAMINOPHEN 325 MG/1
TABLET ORAL
Status: DISPENSED
Start: 2021-11-11

## (undated) RX ORDER — SODIUM CHLORIDE, SODIUM LACTATE, POTASSIUM CHLORIDE, CALCIUM CHLORIDE 600; 310; 30; 20 MG/100ML; MG/100ML; MG/100ML; MG/100ML
INJECTION, SOLUTION INTRAVENOUS
Status: DISPENSED
Start: 2021-11-11

## (undated) RX ORDER — HYDROMORPHONE HYDROCHLORIDE 1 MG/ML
INJECTION, SOLUTION INTRAMUSCULAR; INTRAVENOUS; SUBCUTANEOUS
Status: DISPENSED
Start: 2021-11-11

## (undated) RX ORDER — LIDOCAINE HYDROCHLORIDE 20 MG/ML
INJECTION, SOLUTION EPIDURAL; INFILTRATION; INTRACAUDAL; PERINEURAL
Status: DISPENSED
Start: 2021-11-11

## (undated) RX ORDER — FENTANYL CITRATE 50 UG/ML
INJECTION, SOLUTION INTRAMUSCULAR; INTRAVENOUS
Status: DISPENSED
Start: 2018-03-04

## (undated) RX ORDER — FENTANYL CITRATE 50 UG/ML
INJECTION, SOLUTION INTRAMUSCULAR; INTRAVENOUS
Status: DISPENSED
Start: 2021-11-11

## (undated) RX ORDER — FENTANYL CITRATE 50 UG/ML
INJECTION, SOLUTION INTRAMUSCULAR; INTRAVENOUS
Status: DISPENSED
Start: 2021-01-14

## (undated) RX ORDER — PROPOFOL 10 MG/ML
INJECTION, EMULSION INTRAVENOUS
Status: DISPENSED
Start: 2021-11-11

## (undated) RX ORDER — PHENAZOPYRIDINE HYDROCHLORIDE 100 MG/1
TABLET, FILM COATED ORAL
Status: DISPENSED
Start: 2021-11-11

## (undated) RX ORDER — LIDOCAINE HYDROCHLORIDE 10 MG/ML
INJECTION, SOLUTION EPIDURAL; INFILTRATION; INTRACAUDAL; PERINEURAL
Status: DISPENSED
Start: 2018-03-04

## (undated) RX ORDER — SODIUM CHLORIDE 9 MG/ML
INJECTION, SOLUTION INTRAVENOUS
Status: DISPENSED
Start: 2021-06-18

## (undated) RX ORDER — PROPOFOL 10 MG/ML
INJECTION, EMULSION INTRAVENOUS
Status: DISPENSED
Start: 2021-01-14

## (undated) RX ORDER — KETOROLAC TROMETHAMINE 30 MG/ML
INJECTION, SOLUTION INTRAMUSCULAR; INTRAVENOUS
Status: DISPENSED
Start: 2021-11-11

## (undated) RX ORDER — FENTANYL CITRATE 50 UG/ML
INJECTION, SOLUTION INTRAMUSCULAR; INTRAVENOUS
Status: DISPENSED
Start: 2021-01-13

## (undated) RX ORDER — MORPHINE SULFATE 0.5 MG/ML
INJECTION, SOLUTION EPIDURAL; INTRATHECAL; INTRAVENOUS
Status: DISPENSED
Start: 2021-01-13

## (undated) RX ORDER — ONDANSETRON 2 MG/ML
INJECTION INTRAMUSCULAR; INTRAVENOUS
Status: DISPENSED
Start: 2021-11-11

## (undated) RX ORDER — LIDOCAINE HYDROCHLORIDE 10 MG/ML
INJECTION, SOLUTION EPIDURAL; INFILTRATION; INTRACAUDAL; PERINEURAL
Status: DISPENSED
Start: 2021-11-11

## (undated) RX ORDER — GLYCOPYRROLATE 0.2 MG/ML
INJECTION, SOLUTION INTRAMUSCULAR; INTRAVENOUS
Status: DISPENSED
Start: 2021-01-14

## (undated) RX ORDER — KETOROLAC TROMETHAMINE 30 MG/ML
INJECTION, SOLUTION INTRAMUSCULAR; INTRAVENOUS
Status: DISPENSED
Start: 2021-01-14

## (undated) RX ORDER — DEXAMETHASONE SODIUM PHOSPHATE 4 MG/ML
INJECTION, SOLUTION INTRA-ARTICULAR; INTRALESIONAL; INTRAMUSCULAR; INTRAVENOUS; SOFT TISSUE
Status: DISPENSED
Start: 2021-11-11

## (undated) RX ORDER — DEXAMETHASONE SODIUM PHOSPHATE 4 MG/ML
INJECTION, SOLUTION INTRA-ARTICULAR; INTRALESIONAL; INTRAMUSCULAR; INTRAVENOUS; SOFT TISSUE
Status: DISPENSED
Start: 2021-01-14

## (undated) RX ORDER — LIDOCAINE HYDROCHLORIDE 20 MG/ML
INJECTION, SOLUTION EPIDURAL; INFILTRATION; INTRACAUDAL; PERINEURAL
Status: DISPENSED
Start: 2021-01-14

## (undated) RX ORDER — LIDOCAINE HYDROCHLORIDE AND EPINEPHRINE 10; 10 MG/ML; UG/ML
INJECTION, SOLUTION INFILTRATION; PERINEURAL
Status: DISPENSED
Start: 2021-11-11